# Patient Record
Sex: FEMALE | Race: BLACK OR AFRICAN AMERICAN | NOT HISPANIC OR LATINO
[De-identification: names, ages, dates, MRNs, and addresses within clinical notes are randomized per-mention and may not be internally consistent; named-entity substitution may affect disease eponyms.]

---

## 2017-02-22 ENCOUNTER — RX RENEWAL (OUTPATIENT)
Age: 82
End: 2017-02-22

## 2017-05-04 ENCOUNTER — RX RENEWAL (OUTPATIENT)
Age: 82
End: 2017-05-04

## 2017-06-09 ENCOUNTER — RX RENEWAL (OUTPATIENT)
Age: 82
End: 2017-06-09

## 2017-06-19 ENCOUNTER — RX RENEWAL (OUTPATIENT)
Age: 82
End: 2017-06-19

## 2017-10-01 ENCOUNTER — EMERGENCY (EMERGENCY)
Facility: HOSPITAL | Age: 82
LOS: 1 days | Discharge: HOME | End: 2017-10-01
Admitting: EMERGENCY MEDICINE
Payer: COMMERCIAL

## 2017-10-01 PROCEDURE — 85027 COMPLETE CBC AUTOMATED: CPT

## 2017-10-01 PROCEDURE — 84484 ASSAY OF TROPONIN QUANT: CPT

## 2017-10-01 PROCEDURE — 93005 ELECTROCARDIOGRAM TRACING: CPT

## 2017-10-01 PROCEDURE — 36415 COLL VENOUS BLD VENIPUNCTURE: CPT

## 2017-10-01 PROCEDURE — 80048 BASIC METABOLIC PNL TOTAL CA: CPT

## 2017-10-01 PROCEDURE — 85730 THROMBOPLASTIN TIME PARTIAL: CPT

## 2017-10-01 PROCEDURE — 71045 X-RAY EXAM CHEST 1 VIEW: CPT

## 2017-10-01 PROCEDURE — 85610 PROTHROMBIN TIME: CPT

## 2017-10-01 PROCEDURE — 99284 EMERGENCY DEPT VISIT MOD MDM: CPT | Mod: 25

## 2017-10-01 PROCEDURE — 93010 ELECTROCARDIOGRAM REPORT: CPT

## 2017-10-01 PROCEDURE — 99285 EMERGENCY DEPT VISIT HI MDM: CPT

## 2017-10-01 PROCEDURE — 71010: CPT | Mod: 26

## 2017-10-03 ENCOUNTER — EMERGENCY (EMERGENCY)
Facility: HOSPITAL | Age: 82
LOS: 1 days | Discharge: HOME | End: 2017-10-03
Admitting: INTERNAL MEDICINE
Payer: COMMERCIAL

## 2017-10-03 PROCEDURE — 99220: CPT

## 2017-10-03 PROCEDURE — 93010 ELECTROCARDIOGRAM REPORT: CPT

## 2017-10-03 PROCEDURE — 99285 EMERGENCY DEPT VISIT HI MDM: CPT

## 2017-10-03 PROCEDURE — 70450 CT HEAD/BRAIN W/O DYE: CPT | Mod: 26

## 2017-10-04 PROCEDURE — 99226: CPT

## 2017-10-04 PROCEDURE — 93306 TTE W/DOPPLER COMPLETE: CPT | Mod: 26

## 2017-10-05 PROCEDURE — 82948 REAGENT STRIP/BLOOD GLUCOSE: CPT | Mod: XU

## 2017-10-05 PROCEDURE — 93005 ELECTROCARDIOGRAM TRACING: CPT

## 2017-10-05 PROCEDURE — 85027 COMPLETE CBC AUTOMATED: CPT

## 2017-10-05 PROCEDURE — 99217: CPT

## 2017-10-05 PROCEDURE — G0378: CPT

## 2017-10-05 PROCEDURE — 85652 RBC SED RATE AUTOMATED: CPT

## 2017-10-05 PROCEDURE — 36415 COLL VENOUS BLD VENIPUNCTURE: CPT

## 2017-10-05 PROCEDURE — 96372 THER/PROPH/DIAG INJ SC/IM: CPT

## 2017-10-05 PROCEDURE — 82607 VITAMIN B-12: CPT

## 2017-10-05 PROCEDURE — 82962 GLUCOSE BLOOD TEST: CPT

## 2017-10-05 PROCEDURE — 95812 EEG 41-60 MINUTES: CPT

## 2017-10-05 PROCEDURE — 94640 AIRWAY INHALATION TREATMENT: CPT

## 2017-10-05 PROCEDURE — 80069 RENAL FUNCTION PANEL: CPT

## 2017-10-05 PROCEDURE — 99284 EMERGENCY DEPT VISIT MOD MDM: CPT | Mod: 25

## 2017-10-05 PROCEDURE — 84484 ASSAY OF TROPONIN QUANT: CPT

## 2017-10-05 PROCEDURE — 70450 CT HEAD/BRAIN W/O DYE: CPT | Mod: 26

## 2017-10-05 PROCEDURE — 80048 BASIC METABOLIC PNL TOTAL CA: CPT

## 2017-10-05 PROCEDURE — 84443 ASSAY THYROID STIM HORMONE: CPT

## 2017-10-05 PROCEDURE — 93306 TTE W/DOPPLER COMPLETE: CPT

## 2017-10-05 PROCEDURE — 70450 CT HEAD/BRAIN W/O DYE: CPT

## 2018-01-02 ENCOUNTER — INPATIENT (INPATIENT)
Facility: HOSPITAL | Age: 83
LOS: 2 days | Discharge: NURSING, REHAB OR LONG-TERM CARE | DRG: 377 | End: 2018-01-05
Attending: STUDENT IN AN ORGANIZED HEALTH CARE EDUCATION/TRAINING PROGRAM | Admitting: STUDENT IN AN ORGANIZED HEALTH CARE EDUCATION/TRAINING PROGRAM
Payer: MEDICARE

## 2018-01-02 DIAGNOSIS — J96.01 ACUTE RESPIRATORY FAILURE WITH HYPOXIA: ICD-10-CM

## 2018-01-02 DIAGNOSIS — G62.9 POLYNEUROPATHY, UNSPECIFIED: ICD-10-CM

## 2018-01-02 DIAGNOSIS — J44.9 CHRONIC OBSTRUCTIVE PULMONARY DISEASE, UNSPECIFIED: ICD-10-CM

## 2018-01-02 DIAGNOSIS — J45.909 UNSPECIFIED ASTHMA, UNCOMPLICATED: ICD-10-CM

## 2018-01-02 DIAGNOSIS — G93.41 METABOLIC ENCEPHALOPATHY: ICD-10-CM

## 2018-01-02 DIAGNOSIS — I12.9 HYPERTENSIVE CHRONIC KIDNEY DISEASE WITH STAGE 1 THROUGH STAGE 4 CHRONIC KIDNEY DISEASE, OR UNSPECIFIED CHRONIC KIDNEY DISEASE: ICD-10-CM

## 2018-01-02 DIAGNOSIS — N17.9 ACUTE KIDNEY FAILURE, UNSPECIFIED: ICD-10-CM

## 2018-01-02 DIAGNOSIS — D62 ACUTE POSTHEMORRHAGIC ANEMIA: ICD-10-CM

## 2018-01-02 DIAGNOSIS — K92.2 GASTROINTESTINAL HEMORRHAGE, UNSPECIFIED: ICD-10-CM

## 2018-01-02 DIAGNOSIS — E83.42 HYPOMAGNESEMIA: ICD-10-CM

## 2018-01-02 DIAGNOSIS — F41.9 ANXIETY DISORDER, UNSPECIFIED: ICD-10-CM

## 2018-01-02 DIAGNOSIS — F03.90 UNSPECIFIED DEMENTIA WITHOUT BEHAVIORAL DISTURBANCE: ICD-10-CM

## 2018-01-02 DIAGNOSIS — E87.2 ACIDOSIS: ICD-10-CM

## 2018-01-02 DIAGNOSIS — D64.9 ANEMIA, UNSPECIFIED: ICD-10-CM

## 2018-01-02 DIAGNOSIS — F32.9 MAJOR DEPRESSIVE DISORDER, SINGLE EPISODE, UNSPECIFIED: ICD-10-CM

## 2018-01-02 DIAGNOSIS — N18.3 CHRONIC KIDNEY DISEASE, STAGE 3 (MODERATE): ICD-10-CM

## 2018-01-02 DIAGNOSIS — Z87.891 PERSONAL HISTORY OF NICOTINE DEPENDENCE: ICD-10-CM

## 2018-01-02 PROCEDURE — 93010 ELECTROCARDIOGRAM REPORT: CPT

## 2018-01-02 PROCEDURE — 71045 X-RAY EXAM CHEST 1 VIEW: CPT | Mod: 26

## 2018-01-02 PROCEDURE — 99223 1ST HOSP IP/OBS HIGH 75: CPT

## 2018-01-03 PROCEDURE — 99233 SBSQ HOSP IP/OBS HIGH 50: CPT

## 2018-01-04 PROCEDURE — 99233 SBSQ HOSP IP/OBS HIGH 50: CPT

## 2018-01-05 PROCEDURE — 86922 COMPATIBILITY TEST ANTIGLOB: CPT

## 2018-01-05 PROCEDURE — 87040 BLOOD CULTURE FOR BACTERIA: CPT

## 2018-01-05 PROCEDURE — 97116 GAIT TRAINING THERAPY: CPT

## 2018-01-05 PROCEDURE — 99285 EMERGENCY DEPT VISIT HI MDM: CPT | Mod: 25

## 2018-01-05 PROCEDURE — 86900 BLOOD TYPING SEROLOGIC ABO: CPT

## 2018-01-05 PROCEDURE — 97161 PT EVAL LOW COMPLEX 20 MIN: CPT

## 2018-01-05 PROCEDURE — 83880 ASSAY OF NATRIURETIC PEPTIDE: CPT

## 2018-01-05 PROCEDURE — 82553 CREATINE MB FRACTION: CPT

## 2018-01-05 PROCEDURE — 80053 COMPREHEN METABOLIC PANEL: CPT

## 2018-01-05 PROCEDURE — P9016: CPT

## 2018-01-05 PROCEDURE — 36430 TRANSFUSION BLD/BLD COMPNT: CPT

## 2018-01-05 PROCEDURE — 71045 X-RAY EXAM CHEST 1 VIEW: CPT

## 2018-01-05 PROCEDURE — 84100 ASSAY OF PHOSPHORUS: CPT

## 2018-01-05 PROCEDURE — 83605 ASSAY OF LACTIC ACID: CPT

## 2018-01-05 PROCEDURE — 99239 HOSP IP/OBS DSCHRG MGMT >30: CPT

## 2018-01-05 PROCEDURE — 83735 ASSAY OF MAGNESIUM: CPT

## 2018-01-05 PROCEDURE — 85610 PROTHROMBIN TIME: CPT

## 2018-01-05 PROCEDURE — 84484 ASSAY OF TROPONIN QUANT: CPT

## 2018-01-05 PROCEDURE — 82550 ASSAY OF CK (CPK): CPT

## 2018-01-05 PROCEDURE — 93005 ELECTROCARDIOGRAM TRACING: CPT

## 2018-01-05 PROCEDURE — 82803 BLOOD GASES ANY COMBINATION: CPT

## 2018-01-05 PROCEDURE — 86850 RBC ANTIBODY SCREEN: CPT

## 2018-01-05 PROCEDURE — 80048 BASIC METABOLIC PNL TOTAL CA: CPT

## 2018-01-05 PROCEDURE — 96360 HYDRATION IV INFUSION INIT: CPT

## 2018-01-05 PROCEDURE — 85730 THROMBOPLASTIN TIME PARTIAL: CPT

## 2018-01-05 PROCEDURE — 36600 WITHDRAWAL OF ARTERIAL BLOOD: CPT

## 2018-01-05 PROCEDURE — 85027 COMPLETE CBC AUTOMATED: CPT

## 2018-02-02 ENCOUNTER — APPOINTMENT (OUTPATIENT)
Dept: HEMATOLOGY ONCOLOGY | Facility: CLINIC | Age: 83
End: 2018-02-02

## 2018-06-20 ENCOUNTER — INPATIENT (INPATIENT)
Facility: HOSPITAL | Age: 83
LOS: 2 days | Discharge: OTHER INSTITUTION | DRG: 192 | End: 2018-06-23
Attending: INTERNAL MEDICINE | Admitting: INTERNAL MEDICINE
Payer: MEDICARE

## 2018-06-20 VITALS
SYSTOLIC BLOOD PRESSURE: 181 MMHG | HEART RATE: 85 BPM | OXYGEN SATURATION: 99 % | TEMPERATURE: 98 F | WEIGHT: 130.29 LBS | RESPIRATION RATE: 22 BRPM | DIASTOLIC BLOOD PRESSURE: 117 MMHG

## 2018-06-20 DIAGNOSIS — R06.03 ACUTE RESPIRATORY DISTRESS: ICD-10-CM

## 2018-06-20 DIAGNOSIS — R06.02 SHORTNESS OF BREATH: ICD-10-CM

## 2018-06-20 DIAGNOSIS — J44.9 CHRONIC OBSTRUCTIVE PULMONARY DISEASE, UNSPECIFIED: ICD-10-CM

## 2018-06-20 DIAGNOSIS — I10 ESSENTIAL (PRIMARY) HYPERTENSION: ICD-10-CM

## 2018-06-20 LAB
ALBUMIN SERPL ELPH-MCNC: 3.6 G/DL — SIGNIFICANT CHANGE UP (ref 3.3–5)
ALP SERPL-CCNC: 88 U/L — SIGNIFICANT CHANGE UP (ref 40–120)
ALT FLD-CCNC: 22 U/L DA — SIGNIFICANT CHANGE UP (ref 10–45)
ANION GAP SERPL CALC-SCNC: 7 MMOL/L — SIGNIFICANT CHANGE UP (ref 5–17)
APTT BLD: 25 SEC — LOW (ref 27.5–37.4)
AST SERPL-CCNC: 26 U/L — SIGNIFICANT CHANGE UP (ref 10–40)
BASOPHILS # BLD AUTO: 0.1 K/UL — SIGNIFICANT CHANGE UP (ref 0–0.2)
BASOPHILS NFR BLD AUTO: 1.1 % — SIGNIFICANT CHANGE UP (ref 0–2)
BILIRUB SERPL-MCNC: 0.2 MG/DL — SIGNIFICANT CHANGE UP (ref 0.2–1.2)
BUN SERPL-MCNC: 26 MG/DL — HIGH (ref 7–23)
CALCIUM SERPL-MCNC: 9.1 MG/DL — SIGNIFICANT CHANGE UP (ref 8.4–10.5)
CHLORIDE SERPL-SCNC: 106 MMOL/L — SIGNIFICANT CHANGE UP (ref 96–108)
CO2 SERPL-SCNC: 28 MMOL/L — SIGNIFICANT CHANGE UP (ref 22–31)
CREAT SERPL-MCNC: 1.53 MG/DL — HIGH (ref 0.5–1.3)
D DIMER BLD IA.RAPID-MCNC: 4569 NG/ML DDU — HIGH
EOSINOPHIL # BLD AUTO: 0.1 K/UL — SIGNIFICANT CHANGE UP (ref 0–0.5)
EOSINOPHIL NFR BLD AUTO: 1.4 % — SIGNIFICANT CHANGE UP (ref 0–6)
GLUCOSE SERPL-MCNC: 101 MG/DL — HIGH (ref 70–99)
HCT VFR BLD CALC: 32.9 % — LOW (ref 34.5–45)
HGB BLD-MCNC: 10.4 G/DL — LOW (ref 11.5–15.5)
INR BLD: 1.02 RATIO — SIGNIFICANT CHANGE UP (ref 0.88–1.16)
LYMPHOCYTES # BLD AUTO: 0.6 K/UL — LOW (ref 1–3.3)
LYMPHOCYTES # BLD AUTO: 7.8 % — LOW (ref 13–44)
MCHC RBC-ENTMCNC: 25.4 PG — LOW (ref 27–34)
MCHC RBC-ENTMCNC: 31.7 GM/DL — LOW (ref 32–36)
MCV RBC AUTO: 80.2 FL — SIGNIFICANT CHANGE UP (ref 80–100)
MONOCYTES # BLD AUTO: 0.5 K/UL — SIGNIFICANT CHANGE UP (ref 0–0.9)
MONOCYTES NFR BLD AUTO: 6 % — SIGNIFICANT CHANGE UP (ref 1–9)
NEUTROPHILS # BLD AUTO: 6.9 K/UL — SIGNIFICANT CHANGE UP (ref 1.8–7.4)
NEUTROPHILS NFR BLD AUTO: 83.7 % — HIGH (ref 43–77)
NT-PROBNP SERPL-SCNC: 247 PG/ML — SIGNIFICANT CHANGE UP (ref 0–300)
PLATELET # BLD AUTO: 179 K/UL — SIGNIFICANT CHANGE UP (ref 150–400)
POTASSIUM SERPL-MCNC: 4.5 MMOL/L — SIGNIFICANT CHANGE UP (ref 3.5–5.3)
POTASSIUM SERPL-SCNC: 4.5 MMOL/L — SIGNIFICANT CHANGE UP (ref 3.5–5.3)
PROT SERPL-MCNC: 7.7 G/DL — SIGNIFICANT CHANGE UP (ref 6–8.3)
PROTHROM AB SERPL-ACNC: 11.3 SEC — SIGNIFICANT CHANGE UP (ref 9.8–12.7)
RBC # BLD: 4.1 M/UL — SIGNIFICANT CHANGE UP (ref 3.8–5.2)
RBC # FLD: 18.2 % — HIGH (ref 10.3–14.5)
SODIUM SERPL-SCNC: 141 MMOL/L — SIGNIFICANT CHANGE UP (ref 135–145)
TROPONIN I SERPL-MCNC: <.017 NG/ML — LOW (ref 0.02–0.06)
TROPONIN I SERPL-MCNC: <.017 NG/ML — LOW (ref 0.02–0.06)
WBC # BLD: 8.2 K/UL — SIGNIFICANT CHANGE UP (ref 3.8–10.5)
WBC # FLD AUTO: 8.2 K/UL — SIGNIFICANT CHANGE UP (ref 3.8–10.5)

## 2018-06-20 PROCEDURE — 93010 ELECTROCARDIOGRAM REPORT: CPT

## 2018-06-20 PROCEDURE — 71046 X-RAY EXAM CHEST 2 VIEWS: CPT | Mod: 26

## 2018-06-20 PROCEDURE — 99285 EMERGENCY DEPT VISIT HI MDM: CPT

## 2018-06-20 PROCEDURE — 99223 1ST HOSP IP/OBS HIGH 75: CPT

## 2018-06-20 RX ORDER — NIFEDIPINE 30 MG
1 TABLET, EXTENDED RELEASE 24 HR ORAL
Qty: 0 | Refills: 0 | COMMUNITY

## 2018-06-20 RX ORDER — QUETIAPINE FUMARATE 200 MG/1
0 TABLET, FILM COATED ORAL
Qty: 0 | Refills: 0 | COMMUNITY

## 2018-06-20 RX ORDER — IPRATROPIUM/ALBUTEROL SULFATE 18-103MCG
3 AEROSOL WITH ADAPTER (GRAM) INHALATION EVERY 6 HOURS
Qty: 0 | Refills: 0 | Status: DISCONTINUED | OUTPATIENT
Start: 2018-06-20 | End: 2018-06-23

## 2018-06-20 RX ORDER — ACETAMINOPHEN 500 MG
650 TABLET ORAL EVERY 6 HOURS
Qty: 0 | Refills: 0 | Status: DISCONTINUED | OUTPATIENT
Start: 2018-06-20 | End: 2018-06-23

## 2018-06-20 RX ORDER — ENOXAPARIN SODIUM 100 MG/ML
60 INJECTION SUBCUTANEOUS DAILY
Qty: 0 | Refills: 0 | Status: DISCONTINUED | OUTPATIENT
Start: 2018-06-20 | End: 2018-06-21

## 2018-06-20 RX ORDER — MIRTAZAPINE 45 MG/1
7.5 TABLET, ORALLY DISINTEGRATING ORAL AT BEDTIME
Qty: 0 | Refills: 0 | Status: DISCONTINUED | OUTPATIENT
Start: 2018-06-20 | End: 2018-06-23

## 2018-06-20 RX ORDER — QUETIAPINE FUMARATE 200 MG/1
25 TABLET, FILM COATED ORAL
Qty: 0 | Refills: 0 | Status: DISCONTINUED | OUTPATIENT
Start: 2018-06-20 | End: 2018-06-23

## 2018-06-20 RX ORDER — GABAPENTIN 400 MG/1
600 CAPSULE ORAL AT BEDTIME
Qty: 0 | Refills: 0 | Status: DISCONTINUED | OUTPATIENT
Start: 2018-06-20 | End: 2018-06-23

## 2018-06-20 RX ORDER — HEPARIN SODIUM 5000 [USP'U]/ML
5000 INJECTION INTRAVENOUS; SUBCUTANEOUS EVERY 8 HOURS
Qty: 0 | Refills: 0 | Status: DISCONTINUED | OUTPATIENT
Start: 2018-06-20 | End: 2018-06-23

## 2018-06-20 RX ORDER — GABAPENTIN 400 MG/1
0 CAPSULE ORAL
Qty: 0 | Refills: 0 | COMMUNITY

## 2018-06-20 RX ORDER — LANOLIN ALCOHOL/MO/W.PET/CERES
1 CREAM (GRAM) TOPICAL
Qty: 0 | Refills: 0 | COMMUNITY

## 2018-06-20 RX ORDER — SENNA PLUS 8.6 MG/1
2 TABLET ORAL AT BEDTIME
Qty: 0 | Refills: 0 | Status: DISCONTINUED | OUTPATIENT
Start: 2018-06-20 | End: 2018-06-23

## 2018-06-20 RX ORDER — NIFEDIPINE 30 MG
60 TABLET, EXTENDED RELEASE 24 HR ORAL DAILY
Qty: 0 | Refills: 0 | Status: DISCONTINUED | OUTPATIENT
Start: 2018-06-20 | End: 2018-06-23

## 2018-06-20 RX ORDER — GABAPENTIN 400 MG/1
600 CAPSULE ORAL
Qty: 0 | Refills: 0 | COMMUNITY

## 2018-06-20 RX ORDER — SENNA PLUS 8.6 MG/1
0 TABLET ORAL
Qty: 0 | Refills: 0 | COMMUNITY

## 2018-06-20 RX ORDER — SODIUM CHLORIDE 9 MG/ML
3 INJECTION INTRAMUSCULAR; INTRAVENOUS; SUBCUTANEOUS EVERY 8 HOURS
Qty: 0 | Refills: 0 | Status: DISCONTINUED | OUTPATIENT
Start: 2018-06-20 | End: 2018-06-23

## 2018-06-20 RX ORDER — FERROUS SULFATE 325(65) MG
0 TABLET ORAL
Qty: 0 | Refills: 0 | COMMUNITY

## 2018-06-20 RX ORDER — BUDESONIDE, MICRONIZED 100 %
0.5 POWDER (GRAM) MISCELLANEOUS
Qty: 0 | Refills: 0 | Status: DISCONTINUED | OUTPATIENT
Start: 2018-06-20 | End: 2018-06-23

## 2018-06-20 RX ORDER — LANOLIN ALCOHOL/MO/W.PET/CERES
2 CREAM (GRAM) TOPICAL
Qty: 0 | Refills: 0 | COMMUNITY

## 2018-06-20 RX ORDER — MIRTAZAPINE 45 MG/1
1 TABLET, ORALLY DISINTEGRATING ORAL
Qty: 0 | Refills: 0 | COMMUNITY

## 2018-06-20 RX ORDER — MIRTAZAPINE 45 MG/1
7.5 TABLET, ORALLY DISINTEGRATING ORAL AT BEDTIME
Qty: 0 | Refills: 0 | Status: DISCONTINUED | OUTPATIENT
Start: 2018-06-20 | End: 2018-06-20

## 2018-06-20 RX ORDER — BUDESONIDE, MICRONIZED 100 %
2 POWDER (GRAM) MISCELLANEOUS
Qty: 0 | Refills: 0 | COMMUNITY

## 2018-06-20 RX ORDER — ASCORBIC ACID 60 MG
500 TABLET,CHEWABLE ORAL DAILY
Qty: 0 | Refills: 0 | Status: DISCONTINUED | OUTPATIENT
Start: 2018-06-20 | End: 2018-06-23

## 2018-06-20 RX ORDER — MIRTAZAPINE 45 MG/1
0.5 TABLET, ORALLY DISINTEGRATING ORAL
Qty: 0 | Refills: 0 | COMMUNITY

## 2018-06-20 RX ORDER — FERROUS SULFATE 325(65) MG
325 TABLET ORAL DAILY
Qty: 0 | Refills: 0 | Status: DISCONTINUED | OUTPATIENT
Start: 2018-06-20 | End: 2018-06-23

## 2018-06-20 RX ORDER — SODIUM CHLORIDE 9 MG/ML
3 INJECTION INTRAMUSCULAR; INTRAVENOUS; SUBCUTANEOUS ONCE
Qty: 0 | Refills: 0 | Status: COMPLETED | OUTPATIENT
Start: 2018-06-20 | End: 2018-06-20

## 2018-06-20 RX ORDER — ASCORBIC ACID 60 MG
1 TABLET,CHEWABLE ORAL
Qty: 0 | Refills: 0 | COMMUNITY

## 2018-06-20 RX ADMIN — SODIUM CHLORIDE 3 MILLILITER(S): 9 INJECTION INTRAMUSCULAR; INTRAVENOUS; SUBCUTANEOUS at 18:23

## 2018-06-20 RX ADMIN — MIRTAZAPINE 7.5 MILLIGRAM(S): 45 TABLET, ORALLY DISINTEGRATING ORAL at 22:41

## 2018-06-20 RX ADMIN — ENOXAPARIN SODIUM 60 MILLIGRAM(S): 100 INJECTION SUBCUTANEOUS at 21:06

## 2018-06-20 RX ADMIN — HEPARIN SODIUM 5000 UNIT(S): 5000 INJECTION INTRAVENOUS; SUBCUTANEOUS at 22:41

## 2018-06-20 RX ADMIN — GABAPENTIN 600 MILLIGRAM(S): 400 CAPSULE ORAL at 22:40

## 2018-06-20 RX ADMIN — SENNA PLUS 2 TABLET(S): 8.6 TABLET ORAL at 22:40

## 2018-06-20 RX ADMIN — SODIUM CHLORIDE 3 MILLILITER(S): 9 INJECTION INTRAMUSCULAR; INTRAVENOUS; SUBCUTANEOUS at 22:37

## 2018-06-20 NOTE — H&P ADULT - NSHPLABSRESULTS_GEN_ALL_CORE
10.4   8.2   )-----------( 179      ( 20 Jun 2018 17:55 )             32.9       06-20    141  |  106  |  26<H>  ----------------------------<  101<H>  4.5   |  28  |  1.53<H>    Ca    9.1      20 Jun 2018 17:55    TPro  7.7  /  Alb  3.6  /  TBili  0.2  /  DBili  x   /  AST  26  /  ALT  22  /  AlkPhos  88  06-20         LIVER FUNCTIONS - ( 20 Jun 2018 17:55 )  Alb: 3.6 g/dL / Pro: 7.7 g/dL / ALK PHOS: 88 U/L / ALT: 22 U/L DA / AST: 26 U/L / GGT: x               PT/INR - ( 20 Jun 2018 17:55 )   PT: 11.3 sec;   INR: 1.02 ratio         PTT - ( 20 Jun 2018 17:55 )  PTT:25.0 sec    CARDIAC MARKERS ( 20 Jun 2018 17:55 )  <.017 ng/mL / x     / x     / x     / x          Serum Pro-Brain Natriuretic Peptide: 247 pg/mL (06-20-18 @ 17:55)        CAPILLARY BLOOD GLUCOSE            EKG: NSR at 84bpm, no acute ST changes.      < from: Xray Chest 2 Views PA/Lat (06.20.18 @ 17:53) >    EXAM:  XR CHEST PA LAT 2V      PROCEDURE DATE:  06/20/2018        INTERPRETATION:  cough    PA and lateral radiographs of the chest demonstrate clear lungs.      The costophrenic angles are clear.      The heart is enlarged.     The aorta is tortuous.    The film is rotated    IMPRESSION:  Cardiomegaly with tortuous aorta. Clear lungs.      < end of copied text > 10.4   8.2   )-----------( 179      ( 20 Jun 2018 17:55 )             32.9       06-20    141  |  106  |  26<H>  ----------------------------<  101<H>  4.5   |  28  |  1.53<H>    Ca    9.1      20 Jun 2018 17:55    TPro  7.7  /  Alb  3.6  /  TBili  0.2  /  DBili  x   /  AST  26  /  ALT  22  /  AlkPhos  88  06-20         LIVER FUNCTIONS - ( 20 Jun 2018 17:55 )  Alb: 3.6 g/dL / Pro: 7.7 g/dL / ALK PHOS: 88 U/L / ALT: 22 U/L DA / AST: 26 U/L / GGT: x               PT/INR - ( 20 Jun 2018 17:55 )   PT: 11.3 sec;   INR: 1.02 ratio         PTT - ( 20 Jun 2018 17:55 )  PTT:25.0 sec    CARDIAC MARKERS ( 20 Jun 2018 17:55 )  <.017 ng/mL / x     / x     / x     / x          Serum Pro-Brain Natriuretic Peptide: 247 pg/mL (06-20-18 @ 17:55)        CAPILLARY BLOOD GLUCOSE        06-20 @ 17:55  4569 ng/mL DDU<H>      EKG:      CXR: wet read    EKG: NSR at 84bpm, no acute ST changes.      < from: Xray Chest 2 Views PA/Lat (06.20.18 @ 17:53) >    EXAM:  XR CHEST PA LAT 2V      PROCEDURE DATE:  06/20/2018        INTERPRETATION:  cough    PA and lateral radiographs of the chest demonstrate clear lungs.      The costophrenic angles are clear.      The heart is enlarged.     The aorta is tortuous.    The film is rotated    IMPRESSION:  Cardiomegaly with tortuous aorta. Clear lungs.      < end of copied text >

## 2018-06-20 NOTE — H&P ADULT - HISTORY OF PRESENT ILLNESS
83F hx of dementia, HTN, COPD/asthma on chronic Prednisone, TAA pw from [facility] with hypoxemia with recorded sats in the 80s. Pt denied any specific complaint. Pt is alert but not oriented and can not recall any events. She denied any current SOB, CP, NVD, headache, dysuria.   LM with son Riky Doyle [phone]

## 2018-06-20 NOTE — ED PROVIDER NOTE - MEDICAL DECISION MAKING DETAILS
pt with acute najera without an obvious etiology, BUN/creatinine elevated vq scan tomorrow  Pt will be given one dose of lovenox  Case discussed with Dr. Tanner

## 2018-06-20 NOTE — ED ADULT NURSE NOTE - OBJECTIVE STATEMENT
Pt arrives to ER brought in by EMS for SOB. Staff at the [facility] pt had increased SOB on exertion today with an O2 sat on room air of 83%. Pt given a nebulizer en route to hospital. Pt breathing effortlessly on arrival to ER with an O2 sat of 100 on NRB. Pt has dementia and unable to verbalize complaints. Pt's O2 Sat dropped once removed from NRB so she was placed on nasal cannula at 4LPM. Negative fever/chills. Negative visible trauma noted.

## 2018-06-20 NOTE — H&P ADULT - PMH
Asthma    COPD (chronic obstructive pulmonary disease)    Depression    Hypertension    Thoracic aortic aneurysm without rupture

## 2018-06-20 NOTE — H&P ADULT - NSHPPHYSICALEXAM_GEN_ALL_CORE
Vital Signs Last 24 Hrs  T(C): 36.4 (2018 16:55), Max: 36.4 (2018 16:55)  T(F): 97.5 (2018 16:55), Max: 97.5 (2018 16:55)  HR: 97 (2018 18:56) (84 - 97)  BP: 167/106 (2018 18:56) (167/106 - 181/117)  BP(mean): --  RR: 22 (2018 18:56) (20 - 22)  SpO2: 98% (2018 18:56) (98% - 100%)  Daily     Daily   CAPILLARY BLOOD GLUCOSE        I&O's Summary    currently sat 100% on 2L   GENERAL: NAD  HEAD:  Normocephalic  EYES: EOMI, PERRLA, conjunctiva and sclera clear  ENMT: No tonsillar erythema, exudates, or enlargement; Moist mucous membranes, No lesions  NECK: Supple, No JVD, no bruit, normal thyroid  NERVOUS SYSTEM:  Alert & Oriented to self unable to tell , Good concentration; grossly  Motor Strength 5/5 B/L upper and lower extremities; DTRs 2+ intact and symmetric  CHEST/LUNG: Clear to auscultation bilaterally; No rales, rhonchi, wheezing, or rubs  HEART: Regular rate and rhythm; +systolic murmur, rubs, or gallops  ABDOMEN: Soft, Nontender, Nondistended; Bowel sounds present  EXTREMITIES:  2+ Peripheral Pulses, No clubbing, cyanosis, or edema  LYMPH: No lymphadenopathy noted  SKIN: No rashes or lesions

## 2018-06-20 NOTE — H&P ADULT - ASSESSMENT
83F hx of dementia, HTN, COPD, 83F hx of dementia, HTN, COPD on chronic steroids, TAA pw SOB and hypoxemia with  no obvious COPD exac and elevated D-dimer

## 2018-06-20 NOTE — ED PROVIDER NOTE - OBJECTIVE STATEMENT
83 year old F sent from assisted living for najera.  Pt denies chest pain or shortness of breath at this time.

## 2018-06-20 NOTE — ED ADULT NURSE NOTE - PMH
Abdominal aneurysm    Asthma    COPD (chronic obstructive pulmonary disease)    Depression    Hypertension

## 2018-06-21 DIAGNOSIS — R06.02 SHORTNESS OF BREATH: ICD-10-CM

## 2018-06-21 DIAGNOSIS — D64.9 ANEMIA, UNSPECIFIED: ICD-10-CM

## 2018-06-21 DIAGNOSIS — Z87.891 PERSONAL HISTORY OF NICOTINE DEPENDENCE: ICD-10-CM

## 2018-06-21 DIAGNOSIS — R09.02 HYPOXEMIA: ICD-10-CM

## 2018-06-21 LAB
ALBUMIN SERPL ELPH-MCNC: 3 G/DL — LOW (ref 3.3–5)
ALP SERPL-CCNC: 76 U/L — SIGNIFICANT CHANGE UP (ref 40–120)
ALT FLD-CCNC: 21 U/L DA — SIGNIFICANT CHANGE UP (ref 10–45)
ANION GAP SERPL CALC-SCNC: 6 MMOL/L — SIGNIFICANT CHANGE UP (ref 5–17)
AST SERPL-CCNC: 21 U/L — SIGNIFICANT CHANGE UP (ref 10–40)
BASOPHILS # BLD AUTO: 0.1 K/UL — SIGNIFICANT CHANGE UP (ref 0–0.2)
BASOPHILS NFR BLD AUTO: 1.2 % — SIGNIFICANT CHANGE UP (ref 0–2)
BILIRUB SERPL-MCNC: 0.3 MG/DL — SIGNIFICANT CHANGE UP (ref 0.2–1.2)
BUN SERPL-MCNC: 23 MG/DL — SIGNIFICANT CHANGE UP (ref 7–23)
CALCIUM SERPL-MCNC: 8.8 MG/DL — SIGNIFICANT CHANGE UP (ref 8.4–10.5)
CHLORIDE SERPL-SCNC: 108 MMOL/L — SIGNIFICANT CHANGE UP (ref 96–108)
CO2 SERPL-SCNC: 29 MMOL/L — SIGNIFICANT CHANGE UP (ref 22–31)
CREAT SERPL-MCNC: 1.31 MG/DL — HIGH (ref 0.5–1.3)
EOSINOPHIL # BLD AUTO: 0.3 K/UL — SIGNIFICANT CHANGE UP (ref 0–0.5)
EOSINOPHIL NFR BLD AUTO: 4.4 % — SIGNIFICANT CHANGE UP (ref 0–6)
GLUCOSE SERPL-MCNC: 81 MG/DL — SIGNIFICANT CHANGE UP (ref 70–99)
HBA1C BLD-MCNC: 5.3 % — SIGNIFICANT CHANGE UP (ref 4–5.6)
HCT VFR BLD CALC: 30 % — LOW (ref 34.5–45)
HGB BLD-MCNC: 9 G/DL — LOW (ref 11.5–15.5)
LYMPHOCYTES # BLD AUTO: 0.9 K/UL — LOW (ref 1–3.3)
LYMPHOCYTES # BLD AUTO: 15.7 % — SIGNIFICANT CHANGE UP (ref 13–44)
MAGNESIUM SERPL-MCNC: 2.1 MG/DL — SIGNIFICANT CHANGE UP (ref 1.6–2.6)
MCHC RBC-ENTMCNC: 24.2 PG — LOW (ref 27–34)
MCHC RBC-ENTMCNC: 30 GM/DL — LOW (ref 32–36)
MCV RBC AUTO: 80.6 FL — SIGNIFICANT CHANGE UP (ref 80–100)
MONOCYTES # BLD AUTO: 0.4 K/UL — SIGNIFICANT CHANGE UP (ref 0–0.9)
MONOCYTES NFR BLD AUTO: 7.2 % — SIGNIFICANT CHANGE UP (ref 1–9)
NEUTROPHILS # BLD AUTO: 4.3 K/UL — SIGNIFICANT CHANGE UP (ref 1.8–7.4)
NEUTROPHILS NFR BLD AUTO: 71.5 % — SIGNIFICANT CHANGE UP (ref 43–77)
PHOSPHATE SERPL-MCNC: 3.6 MG/DL — SIGNIFICANT CHANGE UP (ref 2.5–4.5)
PLATELET # BLD AUTO: 162 K/UL — SIGNIFICANT CHANGE UP (ref 150–400)
POTASSIUM SERPL-MCNC: 4 MMOL/L — SIGNIFICANT CHANGE UP (ref 3.5–5.3)
POTASSIUM SERPL-SCNC: 4 MMOL/L — SIGNIFICANT CHANGE UP (ref 3.5–5.3)
PROT SERPL-MCNC: 6.5 G/DL — SIGNIFICANT CHANGE UP (ref 6–8.3)
RBC # BLD: 3.72 M/UL — LOW (ref 3.8–5.2)
RBC # FLD: 17.7 % — HIGH (ref 10.3–14.5)
SODIUM SERPL-SCNC: 143 MMOL/L — SIGNIFICANT CHANGE UP (ref 135–145)
TROPONIN I SERPL-MCNC: <.017 NG/ML — LOW (ref 0.02–0.06)
WBC # BLD: 6 K/UL — SIGNIFICANT CHANGE UP (ref 3.8–10.5)
WBC # FLD AUTO: 6 K/UL — SIGNIFICANT CHANGE UP (ref 3.8–10.5)

## 2018-06-21 PROCEDURE — 71250 CT THORAX DX C-: CPT | Mod: 26

## 2018-06-21 PROCEDURE — 93970 EXTREMITY STUDY: CPT | Mod: 26

## 2018-06-21 PROCEDURE — 99233 SBSQ HOSP IP/OBS HIGH 50: CPT

## 2018-06-21 PROCEDURE — 93306 TTE W/DOPPLER COMPLETE: CPT | Mod: 26

## 2018-06-21 RX ORDER — IPRATROPIUM/ALBUTEROL SULFATE 18-103MCG
3 AEROSOL WITH ADAPTER (GRAM) INHALATION EVERY 6 HOURS
Qty: 0 | Refills: 0 | Status: DISCONTINUED | OUTPATIENT
Start: 2018-06-21 | End: 2018-06-23

## 2018-06-21 RX ORDER — HALOPERIDOL DECANOATE 100 MG/ML
1 INJECTION INTRAMUSCULAR ONCE
Qty: 0 | Refills: 0 | Status: COMPLETED | OUTPATIENT
Start: 2018-06-21 | End: 2018-06-21

## 2018-06-21 RX ORDER — BUDESONIDE AND FORMOTEROL FUMARATE DIHYDRATE 160; 4.5 UG/1; UG/1
2 AEROSOL RESPIRATORY (INHALATION)
Qty: 0 | Refills: 0 | Status: COMPLETED | OUTPATIENT
Start: 2018-06-21 | End: 2019-05-20

## 2018-06-21 RX ORDER — TIOTROPIUM BROMIDE 18 UG/1
1 CAPSULE ORAL; RESPIRATORY (INHALATION) DAILY
Qty: 0 | Refills: 0 | Status: DISCONTINUED | OUTPATIENT
Start: 2018-06-21 | End: 2018-06-23

## 2018-06-21 RX ADMIN — GABAPENTIN 600 MILLIGRAM(S): 400 CAPSULE ORAL at 21:50

## 2018-06-21 RX ADMIN — Medication 5 MILLIGRAM(S): at 05:57

## 2018-06-21 RX ADMIN — SENNA PLUS 2 TABLET(S): 8.6 TABLET ORAL at 21:49

## 2018-06-21 RX ADMIN — Medication 0.5 MILLIGRAM(S): at 10:32

## 2018-06-21 RX ADMIN — HEPARIN SODIUM 5000 UNIT(S): 5000 INJECTION INTRAVENOUS; SUBCUTANEOUS at 05:56

## 2018-06-21 RX ADMIN — SODIUM CHLORIDE 3 MILLILITER(S): 9 INJECTION INTRAMUSCULAR; INTRAVENOUS; SUBCUTANEOUS at 06:08

## 2018-06-21 RX ADMIN — SODIUM CHLORIDE 3 MILLILITER(S): 9 INJECTION INTRAMUSCULAR; INTRAVENOUS; SUBCUTANEOUS at 19:02

## 2018-06-21 RX ADMIN — HALOPERIDOL DECANOATE 1 MILLIGRAM(S): 100 INJECTION INTRAMUSCULAR at 15:24

## 2018-06-21 RX ADMIN — HEPARIN SODIUM 5000 UNIT(S): 5000 INJECTION INTRAVENOUS; SUBCUTANEOUS at 21:50

## 2018-06-21 RX ADMIN — Medication 325 MILLIGRAM(S): at 12:36

## 2018-06-21 RX ADMIN — SODIUM CHLORIDE 3 MILLILITER(S): 9 INJECTION INTRAMUSCULAR; INTRAVENOUS; SUBCUTANEOUS at 21:44

## 2018-06-21 RX ADMIN — HEPARIN SODIUM 5000 UNIT(S): 5000 INJECTION INTRAVENOUS; SUBCUTANEOUS at 15:23

## 2018-06-21 RX ADMIN — QUETIAPINE FUMARATE 25 MILLIGRAM(S): 200 TABLET, FILM COATED ORAL at 05:57

## 2018-06-21 RX ADMIN — Medication 60 MILLIGRAM(S): at 05:57

## 2018-06-21 RX ADMIN — MIRTAZAPINE 7.5 MILLIGRAM(S): 45 TABLET, ORALLY DISINTEGRATING ORAL at 21:49

## 2018-06-21 RX ADMIN — Medication 500 MILLIGRAM(S): at 12:36

## 2018-06-21 NOTE — CONSULT NOTE ADULT - SUBJECTIVE AND OBJECTIVE BOX
PULMONARY CONSULT    Initial HPI on admission:  HPI:  83F hx of dementia, HTN, COPD/asthma on chronic Prednisone, TAA pw from [facility] with hypoxemia with recorded sats in the 80s. Pt denied any specific complaint. Pt is alert but not oriented and can not recall any events. She denied any current SOB, CP, NVD, headache, dysuria.     BRIEF HOSPITAL COURSE: Arousable to voice but does not want to talk. Discussed with son over the phone.     PAST MEDICAL & SURGICAL HISTORY:  Thoracic aortic aneurysm without rupture  Hypertension  Asthma  Depression  COPD (chronic obstructive pulmonary disease)  No significant past surgical history    Allergies    No Known Allergies    Intolerances      FAMILY HISTORY: no known lung cancer    Social history: former 20 pk-yr smoker, quit 30 yrs ago    Review of Systems:  unable to assess at patient would not answer questions      Medications:  MEDICATIONS  (STANDING):  ascorbic acid 500 milliGRAM(s) Oral daily  buDESOnide   0.5 milliGRAM(s) Respule 0.5 milliGRAM(s) Nebulizer two times a day  ferrous    sulfate 325 milliGRAM(s) Oral daily  gabapentin 600 milliGRAM(s) Oral at bedtime  heparin  Injectable 5000 Unit(s) SubCutaneous every 8 hours  mirtazapine 7.5 milliGRAM(s) Oral at bedtime  NIFEdipine XL 60 milliGRAM(s) Oral daily  predniSONE   Tablet 5 milliGRAM(s) Oral daily  QUEtiapine 25 milliGRAM(s) Oral two times a day  senna 2 Tablet(s) Oral at bedtime  sodium chloride 0.9% lock flush 3 milliLiter(s) IV Push every 8 hours    MEDICATIONS  (PRN):  acetaminophen   Tablet 650 milliGRAM(s) Oral every 6 hours PRN For Temp greater than 38 C (100.4 F)  ALBUTerol/ipratropium for Nebulization 3 milliLiter(s) Nebulizer every 6 hours PRN Shortness of Breath and/or Wheezing    Vital Signs Last 24 Hrs  T(C): 36.5 (21 Jun 2018 05:52), Max: 36.5 (21 Jun 2018 05:52)  T(F): 97.7 (21 Jun 2018 05:52), Max: 97.7 (21 Jun 2018 05:52)  HR: 69 (21 Jun 2018 05:52) (69 - 97)  BP: 179/94 (21 Jun 2018 05:52) (151/113 - 181/117)  BP(mean): --  RR: 15 (21 Jun 2018 05:52) (15 - 22)  SpO2: 100% (21 Jun 2018 05:52) (98% - 100%)    LABS:                        9.0    6.0   )-----------( 162      ( 21 Jun 2018 05:05 )             30.0     06-21    143  |  108  |  23  ----------------------------<  81  4.0   |  29  |  1.31<H>    Ca    8.8      21 Jun 2018 05:05  Phos  3.6     06-21  Mg     2.1     06-21    TPro  6.5  /  Alb  3.0<L>  /  TBili  0.3  /  DBili  x   /  AST  21  /  ALT  21  /  AlkPhos  76  06-21      CARDIAC MARKERS ( 21 Jun 2018 04:25 )  <.017 ng/mL / x     / x     / x     / x      CARDIAC MARKERS ( 20 Jun 2018 23:00 )  <.017 ng/mL / x     / x     / x     / x      CARDIAC MARKERS ( 20 Jun 2018 17:55 )  <.017 ng/mL / x     / x     / x     / x          CAPILLARY BLOOD GLUCOSE        PT/INR - ( 20 Jun 2018 17:55 )   PT: 11.3 sec;   INR: 1.02 ratio         PTT - ( 20 Jun 2018 17:55 )  PTT:25.0 sec      Serum Pro-Brain Natriuretic Peptide: 247 pg/mL (06-20-18 @ 17:55)    CULTURES: (if applicable)    Physical Examination:    General: No acute distress.      HEENT: Pupils equal, reactive to light.  Symmetric.    PULM: Decreased BS at bases    CVS: S1, S2    ABD: Soft, nondistended, nontender, normoactive bowel sounds, no masses    EXT: No edema, nontender    SKIN: Warm and well perfused, no rashes noted.    NEURO: Alert, oriented, interactive, nonfocal    RADIOLOGY REVIEWED  CXR: clear    CT chest:    TTE:

## 2018-06-21 NOTE — PROGRESS NOTE ADULT - SUBJECTIVE AND OBJECTIVE BOX
Patient is a 83 year old  female who presents with a chief complaint of hypoxemia, SOB (20 Jun 2018 19:50)    Patient seen and examined, very lethargic unable to provide history/ ROS    MEDICATIONS  (STANDING):  ALBUTerol/ipratropium for Nebulization 3 milliLiter(s) Nebulizer every 6 hours  ascorbic acid 500 milliGRAM(s) Oral daily  buDESOnide   0.5 milliGRAM(s) Respule 0.5 milliGRAM(s) Nebulizer two times a day  buDESOnide 160 MICROgram(s)/formoterol 4.5 MICROgram(s) Inhaler 2 Puff(s) Inhalation two times a day  ferrous    sulfate 325 milliGRAM(s) Oral daily  gabapentin 600 milliGRAM(s) Oral at bedtime  heparin  Injectable 5000 Unit(s) SubCutaneous every 8 hours  mirtazapine 7.5 milliGRAM(s) Oral at bedtime  NIFEdipine XL 60 milliGRAM(s) Oral daily  predniSONE   Tablet 40 milliGRAM(s) Oral once  QUEtiapine 25 milliGRAM(s) Oral two times a day  senna 2 Tablet(s) Oral at bedtime  sodium chloride 0.9% lock flush 3 milliLiter(s) IV Push every 8 hours  tiotropium 18 MICROgram(s) Capsule 1 Capsule(s) Inhalation daily    MEDICATIONS  (PRN):  acetaminophen   Tablet 650 milliGRAM(s) Oral every 6 hours PRN For Temp greater than 38 C (100.4 F)  ALBUTerol/ipratropium for Nebulization 3 milliLiter(s) Nebulizer every 6 hours PRN Shortness of Breath and/or Wheezing      REVIEW OF SYSTEMS: very lethargic unable to provide history/ ROS      PHYSICAL EXAM:  T(C): 36.4 (06-21-18 @ 12:34), Max: 36.5 (06-21-18 @ 05:52)  HR: 71 (06-21-18 @ 12:34) (69 - 97)  BP: 167/90 (06-21-18 @ 12:34) (151/113 - 181/117)  RR: 14 (06-21-18 @ 12:34) (14 - 22)  SpO2: 97% (06-21-18 @ 12:34) (97% - 100%)    I&O's Summary    21 Jun 2018 07:01  -  21 Jun 2018 15:37  --------------------------------------------------------  IN: 0 mL / OUT: 2 mL / NET: -2 mL    GENERAL: Lethargic NAD  	HEAD:  Normocephalic  	EYES: EOMI, PERRL, conjunctiva and sclera clear  	ENMT: No tonsillar erythema, exudates, or enlargement; Moist mucous membranes, No lesions  	NECK: Supple, No JVD, no bruit, normal thyroid  	NERVOUS SYSTEM:  lethargic  	CHEST/LUNG: Clear to auscultation bilaterally; No rales, rhonchi, wheezing, or rubs  	HEART: Regular rate and rhythm; +systolic murmur, rubs, or gallops  	ABDOMEN: Soft, Nontender, Nondistended; Bowel sounds present  	EXTREMITIES:  2+ Peripheral Pulses, No clubbing, cyanosis, or edema  	LYMPH: No lymphadenopathy noted        LABS:                        9.0    6.0   )-----------( 162      ( 21 Jun 2018 05:05 )             30.0     06-21    143  |  108  |  23  ----------------------------<  81  4.0   |  29  |  1.31<H>    Ca    8.8      21 Jun 2018 05:05  Phos  3.6     06-21  Mg     2.1     06-21    TPro  6.5  /  Alb  3.0<L>  /  TBili  0.3  /  DBili  x   /  AST  21  /  ALT  21  /  AlkPhos  76  06-21    PT/INR - ( 20 Jun 2018 17:55 )   PT: 11.3 sec;   INR: 1.02 ratio         PTT - ( 20 Jun 2018 17:55 )  PTT:25.0 sec    RADIOLOGY & ADDITIONAL TESTS:    Imaging Personally Reviewed:  [x] YES  [ ] NO    Consultant(s) Notes Reviewed:  [x] YES  [ ] NO    Care Discussed with Consultants/Other Providers [x] YES  [ ] NO

## 2018-06-21 NOTE — PROGRESS NOTE ADULT - ASSESSMENT
83F hx of dementia, HTN, COPD on chronic steroids, TAA pw SOB and hypoxemia with  no obvious COPD exac and elevated D-dimer

## 2018-06-21 NOTE — PROGRESS NOTE ADULT - PROBLEM SELECTOR PLAN 1
check CT chest,  LE dopplers, will not be able to tolerate V/Q scan, elevated d-dimer   pulm - Dr. Bonner consulted

## 2018-06-22 DIAGNOSIS — F01.50 VASCULAR DEMENTIA WITHOUT BEHAVIORAL DISTURBANCE: ICD-10-CM

## 2018-06-22 DIAGNOSIS — I71.4 ABDOMINAL AORTIC ANEURYSM, WITHOUT RUPTURE: ICD-10-CM

## 2018-06-22 DIAGNOSIS — I71.2 THORACIC AORTIC ANEURYSM, WITHOUT RUPTURE: ICD-10-CM

## 2018-06-22 LAB — AMMONIA BLD-MCNC: 33 UMOL/L — SIGNIFICANT CHANGE UP (ref 11–55)

## 2018-06-22 PROCEDURE — 99233 SBSQ HOSP IP/OBS HIGH 50: CPT

## 2018-06-22 PROCEDURE — 71045 X-RAY EXAM CHEST 1 VIEW: CPT | Mod: 26

## 2018-06-22 PROCEDURE — 99223 1ST HOSP IP/OBS HIGH 75: CPT

## 2018-06-22 RX ORDER — BUDESONIDE AND FORMOTEROL FUMARATE DIHYDRATE 160; 4.5 UG/1; UG/1
2 AEROSOL RESPIRATORY (INHALATION)
Qty: 0 | Refills: 0 | Status: DISCONTINUED | OUTPATIENT
Start: 2018-06-22 | End: 2018-06-23

## 2018-06-22 RX ADMIN — Medication 0.5 MILLIGRAM(S): at 10:06

## 2018-06-22 RX ADMIN — SODIUM CHLORIDE 3 MILLILITER(S): 9 INJECTION INTRAMUSCULAR; INTRAVENOUS; SUBCUTANEOUS at 06:21

## 2018-06-22 RX ADMIN — Medication 325 MILLIGRAM(S): at 12:20

## 2018-06-22 RX ADMIN — Medication 60 MILLIGRAM(S): at 06:16

## 2018-06-22 RX ADMIN — HEPARIN SODIUM 5000 UNIT(S): 5000 INJECTION INTRAVENOUS; SUBCUTANEOUS at 15:05

## 2018-06-22 RX ADMIN — Medication 3 MILLILITER(S): at 16:31

## 2018-06-22 RX ADMIN — SODIUM CHLORIDE 3 MILLILITER(S): 9 INJECTION INTRAMUSCULAR; INTRAVENOUS; SUBCUTANEOUS at 14:46

## 2018-06-22 RX ADMIN — GABAPENTIN 600 MILLIGRAM(S): 400 CAPSULE ORAL at 21:31

## 2018-06-22 RX ADMIN — Medication 40 MILLIGRAM(S): at 06:16

## 2018-06-22 RX ADMIN — Medication 500 MILLIGRAM(S): at 12:20

## 2018-06-22 RX ADMIN — QUETIAPINE FUMARATE 25 MILLIGRAM(S): 200 TABLET, FILM COATED ORAL at 06:21

## 2018-06-22 RX ADMIN — HEPARIN SODIUM 5000 UNIT(S): 5000 INJECTION INTRAVENOUS; SUBCUTANEOUS at 21:30

## 2018-06-22 RX ADMIN — SODIUM CHLORIDE 3 MILLILITER(S): 9 INJECTION INTRAMUSCULAR; INTRAVENOUS; SUBCUTANEOUS at 21:33

## 2018-06-22 RX ADMIN — Medication 0.5 MILLIGRAM(S): at 22:12

## 2018-06-22 RX ADMIN — MIRTAZAPINE 7.5 MILLIGRAM(S): 45 TABLET, ORALLY DISINTEGRATING ORAL at 21:31

## 2018-06-22 RX ADMIN — BUDESONIDE AND FORMOTEROL FUMARATE DIHYDRATE 2 PUFF(S): 160; 4.5 AEROSOL RESPIRATORY (INHALATION) at 22:09

## 2018-06-22 RX ADMIN — Medication 3 MILLILITER(S): at 22:13

## 2018-06-22 RX ADMIN — SENNA PLUS 2 TABLET(S): 8.6 TABLET ORAL at 21:30

## 2018-06-22 RX ADMIN — HEPARIN SODIUM 5000 UNIT(S): 5000 INJECTION INTRAVENOUS; SUBCUTANEOUS at 06:16

## 2018-06-22 RX ADMIN — Medication 3 MILLILITER(S): at 10:06

## 2018-06-22 RX ADMIN — QUETIAPINE FUMARATE 25 MILLIGRAM(S): 200 TABLET, FILM COATED ORAL at 17:43

## 2018-06-22 NOTE — CONSULT NOTE ADULT - SUBJECTIVE AND OBJECTIVE BOX
HPI:  83F hx of dementia, HTN, COPD/asthma on chronic Prednisone, TAA pw from [facility] with hypoxemia with recorded sats in the 80s. Pt denied any specific complaint. Pt is alert but not oriented and can not recall any events. She denied any current SOB, CP, NVD, headache, dysuria.   LM with son Riky Doyle [phone] (20 Jun 2018 19:50)      PAST MEDICAL & SURGICAL HISTORY:  Thoracic aortic aneurysm without rupture  Hypertension  Asthma  Depression  COPD (chronic obstructive pulmonary disease)  No significant past surgical history      SOCIAL HISTORY:    Admitted from:  home assisted living [facility]   Substance abuse history:              Tobacco hx:                  Alcohol hx:              Home Opioid hx:  Zoroastrian:                                    Preferred Language:    Surrogate/HCP/Guardian:            Phone#:    FAMILY HISTORY:    Baseline ADLs (prior to admission):    Allergies    No Known Allergies    Intolerances      Present Symptoms:   Dyspnea:   Nausea/Vomiting:   Anxiety:  Depressed   Fatigue:  Loss of appetite:   Pain:                                location:          Review of Systems: [All others negative or Unable to obtain due to poor mentation]    MEDICATIONS  (STANDING):  ALBUTerol/ipratropium for Nebulization 3 milliLiter(s) Nebulizer every 6 hours  ascorbic acid 500 milliGRAM(s) Oral daily  buDESOnide   0.5 milliGRAM(s) Respule 0.5 milliGRAM(s) Nebulizer two times a day  buDESOnide 160 MICROgram(s)/formoterol 4.5 MICROgram(s) Inhaler 2 Puff(s) Inhalation two times a day  ferrous    sulfate 325 milliGRAM(s) Oral daily  gabapentin 600 milliGRAM(s) Oral at bedtime  heparin  Injectable 5000 Unit(s) SubCutaneous every 8 hours  mirtazapine 7.5 milliGRAM(s) Oral at bedtime  NIFEdipine XL 60 milliGRAM(s) Oral daily  predniSONE   Tablet 40 milliGRAM(s) Oral daily  QUEtiapine 25 milliGRAM(s) Oral two times a day  senna 2 Tablet(s) Oral at bedtime  sodium chloride 0.9% lock flush 3 milliLiter(s) IV Push every 8 hours  tiotropium 18 MICROgram(s) Capsule 1 Capsule(s) Inhalation daily    MEDICATIONS  (PRN):  acetaminophen   Tablet 650 milliGRAM(s) Oral every 6 hours PRN For Temp greater than 38 C (100.4 F)  ALBUTerol/ipratropium for Nebulization 3 milliLiter(s) Nebulizer every 6 hours PRN Shortness of Breath and/or Wheezing      PHYSICAL EXAM:    Vital Signs Last 24 Hrs  T(C): 36.4 (22 Jun 2018 10:31), Max: 37.1 (21 Jun 2018 21:57)  T(F): 97.6 (22 Jun 2018 10:31), Max: 98.8 (21 Jun 2018 21:57)  HR: 80 (22 Jun 2018 10:31) (74 - 92)  BP: 146/80 (22 Jun 2018 10:31) (146/80 - 160/86)  BP(mean): --  RR: 14 (22 Jun 2018 10:31) (14 - 16)  SpO2: 100% (22 Jun 2018 10:31) (95% - 100%)    General: alert  oriented x __1__      HEENT: normal    Lungs: comfortable   CV: normal   GI: normal                  : normal    Musculoskeletal:   weakness    Skin: normal    Neuro: no deficits cognitive impairment dsyphagia/dysarthria paresis: other:  Oral intake ability: unable/only mouth care [minimal moderate full capability]  Diet: [NPO]    LABS:                        9.0    6.0   )-----------( 162      ( 21 Jun 2018 05:05 )             30.0     06-21    143  |  108  |  23  ----------------------------<  81  4.0   |  29  |  1.31<H>    Ca    8.8      21 Jun 2018 05:05  Phos  3.6     06-21  Mg     2.1     06-21    TPro  6.5  /  Alb  3.0<L>  /  TBili  0.3  /  DBili  x   /  AST  21  /  ALT  21  /  AlkPhos  76  06-21        RADIOLOGY & ADDITIONAL STUDIES:    ADVANCE DIRECTIVES:   Advanced Care Planning discussion total time spent: HPI:  83F hx of dementia, HTN, COPD/asthma on chronic Prednisone, TAA pw from [facility] with hypoxemia with recorded sats in the 80s. Pt denied any specific complaint. Pt is alert but not oriented and can not recall any events. She denied any current SOB, CP, NVD, headache, dysuria.   case has been reviewed with Riky today.  He is not official health care proxy but other children are not involved in care of patient.  He is aware that advance directives need to be adressed      PAST MEDICAL & SURGICAL HISTORY:  Thoracic aortic aneurysm without rupture  Hypertension  Asthma  Depression  COPD (chronic obstructive pulmonary disease)  No significant past surgical history      SOCIAL HISTORY:    Admitted from:  home assisted living [facility]   Substance abuse history:              Tobacco hx:                  Alcohol hx:              Home Opioid hx:  Presybeterian:                                    Preferred Language:    Surrogate/HCP/Guardian:  Riky Doyle    FAMILY HISTORY: nc    Baseline ADLs (prior to admission):demented    Allergies    No Known Allergies  Present Symptoms:   Dyspnea: resolved  Nausea/Vomiting: no  Anxiety:no  Depressenod   Fatigue:no  Loss of appetite: n  Pain: no                               location:          Review of Systems: [All others negative or Unable to obtain due to poor mentation]    MEDICATIONS  (STANDING):  ALBUTerol/ipratropium for Nebulization 3 milliLiter(s) Nebulizer every 6 hours  ascorbic acid 500 milliGRAM(s) Oral daily  buDESOnide   0.5 milliGRAM(s) Respule 0.5 milliGRAM(s) Nebulizer two times a day  buDESOnide 160 MICROgram(s)/formoterol 4.5 MICROgram(s) Inhaler 2 Puff(s) Inhalation two times a day  ferrous    sulfate 325 milliGRAM(s) Oral daily  gabapentin 600 milliGRAM(s) Oral at bedtime  heparin  Injectable 5000 Unit(s) SubCutaneous every 8 hours  mirtazapine 7.5 milliGRAM(s) Oral at bedtime  NIFEdipine XL 60 milliGRAM(s) Oral daily  predniSONE   Tablet 40 milliGRAM(s) Oral daily  QUEtiapine 25 milliGRAM(s) Oral two times a day  senna 2 Tablet(s) Oral at bedtime  sodium chloride 0.9% lock flush 3 milliLiter(s) IV Push every 8 hours  tiotropium 18 MICROgram(s) Capsule 1 Capsule(s) Inhalation daily    MEDICATIONS  (PRN):  acetaminophen   Tablet 650 milliGRAM(s) Oral every 6 hours PRN For Temp greater than 38 C (100.4 F)  ALBUTerol/ipratropium for Nebulization 3 milliLiter(s) Nebulizer every 6 hours PRN Shortness of Breath and/or Wheezing      PHYSICAL EXAM:    Vital Signs Last 24 Hrs  T(C): 36.4 (22 Jun 2018 10:31), Max: 37.1 (21 Jun 2018 21:57)  T(F): 97.6 (22 Jun 2018 10:31), Max: 98.8 (21 Jun 2018 21:57)  HR: 80 (22 Jun 2018 10:31) (74 - 92)  BP: 146/80 (22 Jun 2018 10:31) (146/80 - 160/86)  BP(mean): --  RR: 14 (22 Jun 2018 10:31) (14 - 16)  SpO2: 100% (22 Jun 2018 10:31) (95% - 100%)    General: alert  oriented x __1__      HEENT: normal    Lungs: comfortable   CV: normal   GI: normal                  : normal    Musculoskeletal:   weakness    Skin: normal    Neuro: cognitive impairment   Oral intake ability:  full capability]  Diet: [NPO]    LABS:                        9.0    6.0   )-----------( 162      ( 21 Jun 2018 05:05 )             30.0     06-21    143  |  108  |  23  ----------------------------<  81  4.0   |  29  |  1.31<H>    Ca    8.8      21 Jun 2018 05:05  Phos  3.6     06-21  Mg     2.1     06-21    TPro  6.5  /  Alb  3.0<L>  /  TBili  0.3  /  DBili  x   /  AST  21  /  ALT  21  /  AlkPhos  76  06-21        RADIOLOGY & ADDITIONAL STUDIES:    ADVANCE DIRECTIVES:   Advanced Care Planning discussion total time spent: 20 minutes

## 2018-06-22 NOTE — DIETITIAN INITIAL EVALUATION ADULT. - ENERGY NEEDS
Ht: 60 inches Wt: 125.4 pounds BMI: 24.4 kg/m2 IBW: 100 (+/-10%) 125.4 %IBW  Pertinent information: Pt 82 y/o F with PMH: HTN, dementia, COPD, asthma, TAA, depression, admitted with hypoxemia, SOB, found with COPD exacerbation.   No noted edema as per flow sheets. Skin: no noted pressure injuries as per documentation.

## 2018-06-22 NOTE — CONSULT NOTE ADULT - CONSULT REASON
Initial HPI on admission:  HPI:  83F hx of dementia, HTN, COPD/asthma on chronic Prednisone, TAA pw from [facility] with hypoxemia with recorded sats in the 80s. Pt denied any specific complaint. Pt is alert but not oriented and can not recall any events. She denied any current SOB, CP, NVD, headache, dysuria.

## 2018-06-22 NOTE — PROGRESS NOTE ADULT - PROBLEM SELECTOR PLAN 5
pt. with known aneurysm  Follow up outpatient with Vascular - Dr. Galvan Cont seroquel  Palliative consult - Dr. Schultz pending

## 2018-06-22 NOTE — DIETITIAN INITIAL EVALUATION ADULT. - NS AS NUTRI INTERV ED CONTENT
Provided education on COPD nutrition therapy. Described COPD as a progressive disease. Provided recommendations to increase PO intake, recommended small frequent meals. Recommended nutritional supplements when PO intake <50%. Pt amenable for education. RD remains available.

## 2018-06-22 NOTE — PROGRESS NOTE ADULT - PROBLEM SELECTOR PLAN 6
Cont seroquel  Palliative consult - Dr. Schultz pending 5.1 cm. vascular follow up as outpatient , instructed to patient

## 2018-06-22 NOTE — PROGRESS NOTE ADULT - PROBLEM SELECTOR PLAN 1
Most likely 2/2 COPD exacerbation, however elevated D-Dimer  VQ Scan to r/o PE  Pulmonology - Dr. Espinosa following pt.   Cont. Nebs and steroids Most likely 2/2 COPD exacerbation, however elevated D-Dimer  VQ Scan to r/o PE could not be done as patient could not comply with exam.   Pulmonology - Dr. Espinosa following pt.   Cont. Nebs and steroids

## 2018-06-22 NOTE — DIETITIAN INITIAL EVALUATION ADULT. - PROBLEM SELECTOR PLAN 1
no obvious CHF and no obvious COPD exac.   With sig elevated d-dimer - check VQ scan (elev creatinine)  s/p 1 dose of lovenox in ER

## 2018-06-22 NOTE — PROGRESS NOTE ADULT - ATTENDING COMMENTS
Palliative care consult  Estefania Ivey MD
as above  d/w son- no decision on advance directives  advised MOLST form
follow up V/Q scan r/o PE   If negative, would consider discharging home as pt is no longer SOB , saturating well

## 2018-06-22 NOTE — DIETITIAN INITIAL EVALUATION ADULT. - ADHERENCE
Pt denies following any type of diet or restriction at home. Reports taking Ferrous Sulfate, Slow-Mag, and vitamin C. Noted pt on Prednisone PTA as per chart. Denies weight changes PTA./n/a

## 2018-06-22 NOTE — PROGRESS NOTE ADULT - SUBJECTIVE AND OBJECTIVE BOX
Patient is a 83y old  Female who presents with a chief complaint of hypoxemia, SOB (20 Jun 2018 19:50)      Patient seen and examined at bedside.    ALLERGIES:  No Known Allergies    MEDICATIONS:  acetaminophen   Tablet 650 milliGRAM(s) Oral every 6 hours PRN  ALBUTerol/ipratropium for Nebulization 3 milliLiter(s) Nebulizer every 6 hours  ALBUTerol/ipratropium for Nebulization 3 milliLiter(s) Nebulizer every 6 hours PRN  ascorbic acid 500 milliGRAM(s) Oral daily  buDESOnide   0.5 milliGRAM(s) Respule 0.5 milliGRAM(s) Nebulizer two times a day  buDESOnide 160 MICROgram(s)/formoterol 4.5 MICROgram(s) Inhaler 2 Puff(s) Inhalation two times a day  ferrous    sulfate 325 milliGRAM(s) Oral daily  gabapentin 600 milliGRAM(s) Oral at bedtime  heparin  Injectable 5000 Unit(s) SubCutaneous every 8 hours  mirtazapine 7.5 milliGRAM(s) Oral at bedtime  NIFEdipine XL 60 milliGRAM(s) Oral daily  predniSONE   Tablet 40 milliGRAM(s) Oral daily  QUEtiapine 25 milliGRAM(s) Oral two times a day  senna 2 Tablet(s) Oral at bedtime  sodium chloride 0.9% lock flush 3 milliLiter(s) IV Push every 8 hours  tiotropium 18 MICROgram(s) Capsule 1 Capsule(s) Inhalation daily    Vital Signs Last 24 Hrs  T(C): 36.4 (22 Jun 2018 10:31), Max: 37.1 (21 Jun 2018 21:57)  T(F): 97.6 (22 Jun 2018 10:31), Max: 98.8 (21 Jun 2018 21:57)  HR: 80 (22 Jun 2018 10:31) (74 - 92)  BP: 146/80 (22 Jun 2018 10:31) (146/80 - 160/86)  BP(mean): --  RR: 14 (22 Jun 2018 10:31) (14 - 16)  SpO2: 100% (22 Jun 2018 10:31) (95% - 100%)  I&O's Summary    21 Jun 2018 07:01  -  22 Jun 2018 07:00  --------------------------------------------------------  IN: 120 mL / OUT: 4 mL / NET: 116 mL    22 Jun 2018 07:01  -  22 Jun 2018 12:49  --------------------------------------------------------  IN: 200 mL / OUT: 0 mL / NET: 200 mL        PHYSICAL EXAM:  General: NAD, A/O x3  ENT: MMM  Neck: Supple, No JVD  Lungs: Clear to percussion bilaterally  Cardio: RRR, S1/S2, No murmurs  Abdomen: Soft, Nontender, Nondistended; Bowel sounds present  Extremities: No clubbing, cyanosis, or edema    LABS:                        9.0    6.0   )-----------( 162      ( 21 Jun 2018 05:05 )             30.0     06-21    143  |  108  |  23  ----------------------------<  81  4.0   |  29  |  1.31    Ca    8.8      21 Jun 2018 05:05  Phos  3.6     06-21  Mg     2.1     06-21    TPro  6.5  /  Alb  3.0  /  TBili  0.3  /  DBili  x   /  AST  21  /  ALT  21  /  AlkPhos  76  06-21    PT/INR - ( 20 Jun 2018 17:55 )   PT: 11.3 sec;   INR: 1.02 ratio         PTT - ( 20 Jun 2018 17:55 )  PTT:25.0 sec            CAPILLARY BLOOD GLUCOSE        06-21 [id number] 5.3< from: CT Chest No Cont (06.21.18 @ 16:20) >    History hypoxia    Limited evaluation of the upper abdomen demonstrates an infrarenal   abdominal aortic aneurysm measuring 5.1 cm in AP dimension. The   descending thoracic aorta is slightly ectatic and tortuous but the   ascending aorta is normal in caliber.    There is mild to moderate diffuse emphysema without dominant bullous   change. There is no segmental consolidation or pleural or pericardial   effusion. There is mild cardiomegaly. There is mild linear atelectasis in   the lung bases. There is no robert central edema or cavitation    IMPRESSION:    Emphysema without acute findings. Abdominal aortic aneurysm        < end of copied text >            RADIOLOGY & ADDITIONAL TESTS:     Care Discussed with Consultants/Other Providers: Pulmonology - Dr. Espinosa; Palliative - Dr. Schultz Patient is a 83y old  Female who presents with a chief complaint of hypoxemia, SOB      Patient seen and examined at bedside.    ALLERGIES:  No Known Allergies    MEDICATIONS:  acetaminophen   Tablet 650 milliGRAM(s) Oral every 6 hours PRN  ALBUTerol/ipratropium for Nebulization 3 milliLiter(s) Nebulizer every 6 hours  ALBUTerol/ipratropium for Nebulization 3 milliLiter(s) Nebulizer every 6 hours PRN  ascorbic acid 500 milliGRAM(s) Oral daily  buDESOnide   0.5 milliGRAM(s) Respule 0.5 milliGRAM(s) Nebulizer two times a day  buDESOnide 160 MICROgram(s)/formoterol 4.5 MICROgram(s) Inhaler 2 Puff(s) Inhalation two times a day  ferrous    sulfate 325 milliGRAM(s) Oral daily  gabapentin 600 milliGRAM(s) Oral at bedtime  heparin  Injectable 5000 Unit(s) SubCutaneous every 8 hours  mirtazapine 7.5 milliGRAM(s) Oral at bedtime  NIFEdipine XL 60 milliGRAM(s) Oral daily  predniSONE   Tablet 40 milliGRAM(s) Oral daily  QUEtiapine 25 milliGRAM(s) Oral two times a day  senna 2 Tablet(s) Oral at bedtime  sodium chloride 0.9% lock flush 3 milliLiter(s) IV Push every 8 hours  tiotropium 18 MICROgram(s) Capsule 1 Capsule(s) Inhalation daily    Vital Signs Last 24 Hrs  T(C): 36.4 (22 Jun 2018 10:31), Max: 37.1 (21 Jun 2018 21:57)  T(F): 97.6 (22 Jun 2018 10:31), Max: 98.8 (21 Jun 2018 21:57)  HR: 80 (22 Jun 2018 10:31) (74 - 92)  BP: 146/80 (22 Jun 2018 10:31) (146/80 - 160/86)  BP(mean): --  RR: 14 (22 Jun 2018 10:31) (14 - 16)  SpO2: 100% (22 Jun 2018 10:31) (95% - 100%)  I&O's Summary    21 Jun 2018 07:01  -  22 Jun 2018 07:00  --------------------------------------------------------  IN: 120 mL / OUT: 4 mL / NET: 116 mL    22 Jun 2018 07:01  -  22 Jun 2018 12:49  --------------------------------------------------------  IN: 200 mL / OUT: 0 mL / NET: 200 mL        PHYSICAL EXAM:  General: NAD, A/O x3  ENT: MMM  Neck: Supple, No JVD  Lungs: Clear to percussion bilaterally  Cardio: RRR, S1/S2, No murmurs  Abdomen: Soft, Nontender, Nondistended; Bowel sounds present  Extremities: No clubbing, cyanosis, or edema    LABS:                        9.0    6.0   )-----------( 162      ( 21 Jun 2018 05:05 )             30.0     06-21    143  |  108  |  23  ----------------------------<  81  4.0   |  29  |  1.31    Ca    8.8      21 Jun 2018 05:05  Phos  3.6     06-21  Mg     2.1     06-21    TPro  6.5  /  Alb  3.0  /  TBili  0.3  /  DBili  x   /  AST  21  /  ALT  21  /  AlkPhos  76  06-21    PT/INR - ( 20 Jun 2018 17:55 )   PT: 11.3 sec;   INR: 1.02 ratio         PTT - ( 20 Jun 2018 17:55 )  PTT:25.0 sec            CAPILLARY BLOOD GLUCOSE        06-21 [id number] 5.3< from: CT Chest No Cont (06.21.18 @ 16:20) >    History hypoxia    Limited evaluation of the upper abdomen demonstrates an infrarenal   abdominal aortic aneurysm measuring 5.1 cm in AP dimension. The   descending thoracic aorta is slightly ectatic and tortuous but the   ascending aorta is normal in caliber.    There is mild to moderate diffuse emphysema without dominant bullous   change. There is no segmental consolidation or pleural or pericardial   effusion. There is mild cardiomegaly. There is mild linear atelectasis in   the lung bases. There is no robert central edema or cavitation    IMPRESSION:    Emphysema without acute findings. Abdominal aortic aneurysm        < end of copied text >            RADIOLOGY & ADDITIONAL TESTS:     Care Discussed with Consultants/Other Providers: Pulmonology - Dr. Espinosa; Palliative - Dr. Schultz

## 2018-06-22 NOTE — PROGRESS NOTE ADULT - SUBJECTIVE AND OBJECTIVE BOX
Follow-up Pulm Progress Note    Much more awake and aler today.     Medications:  MEDICATIONS  (STANDING):  ALBUTerol/ipratropium for Nebulization 3 milliLiter(s) Nebulizer every 6 hours  ascorbic acid 500 milliGRAM(s) Oral daily  buDESOnide   0.5 milliGRAM(s) Respule 0.5 milliGRAM(s) Nebulizer two times a day  buDESOnide 160 MICROgram(s)/formoterol 4.5 MICROgram(s) Inhaler 2 Puff(s) Inhalation two times a day  ferrous    sulfate 325 milliGRAM(s) Oral daily  gabapentin 600 milliGRAM(s) Oral at bedtime  heparin  Injectable 5000 Unit(s) SubCutaneous every 8 hours  mirtazapine 7.5 milliGRAM(s) Oral at bedtime  NIFEdipine XL 60 milliGRAM(s) Oral daily  predniSONE   Tablet 40 milliGRAM(s) Oral daily  QUEtiapine 25 milliGRAM(s) Oral two times a day  senna 2 Tablet(s) Oral at bedtime  sodium chloride 0.9% lock flush 3 milliLiter(s) IV Push every 8 hours  tiotropium 18 MICROgram(s) Capsule 1 Capsule(s) Inhalation daily    MEDICATIONS  (PRN):  acetaminophen   Tablet 650 milliGRAM(s) Oral every 6 hours PRN For Temp greater than 38 C (100.4 F)  ALBUTerol/ipratropium for Nebulization 3 milliLiter(s) Nebulizer every 6 hours PRN Shortness of Breath and/or Wheezing    Vital Signs Last 24 Hrs  T(C): 36.4 (22 Jun 2018 10:31), Max: 37.1 (21 Jun 2018 21:57)  T(F): 97.6 (22 Jun 2018 10:31), Max: 98.8 (21 Jun 2018 21:57)  HR: 80 (22 Jun 2018 10:31) (71 - 92)  BP: 146/80 (22 Jun 2018 10:31) (146/80 - 167/90)  BP(mean): --  RR: 14 (22 Jun 2018 10:31) (14 - 16)  SpO2: 100% (22 Jun 2018 10:31) (95% - 100%)    06-21 @ 07:01  -  06-22 @ 07:00  --------------------------------------------------------  IN: 120 mL / OUT: 4 mL / NET: 116 mL    LABS:                        9.0    6.0   )-----------( 162      ( 21 Jun 2018 05:05 )             30.0     06-21    143  |  108  |  23  ----------------------------<  81  4.0   |  29  |  1.31<H>    Ca    8.8      21 Jun 2018 05:05  Phos  3.6     06-21  Mg     2.1     06-21    TPro  6.5  /  Alb  3.0<L>  /  TBili  0.3  /  DBili  x   /  AST  21  /  ALT  21  /  AlkPhos  76  06-21      CARDIAC MARKERS ( 21 Jun 2018 04:25 )  <.017 ng/mL / x     / x     / x     / x      CARDIAC MARKERS ( 20 Jun 2018 23:00 )  <.017 ng/mL / x     / x     / x     / x      CARDIAC MARKERS ( 20 Jun 2018 17:55 )  <.017 ng/mL / x     / x     / x     / x          CAPILLARY BLOOD GLUCOSE    PT/INR - ( 20 Jun 2018 17:55 )   PT: 11.3 sec;   INR: 1.02 ratio         PTT - ( 20 Jun 2018 17:55 )  PTT:25.0 sec      Serum Pro-Brain Natriuretic Peptide: 247 pg/mL (06-20-18 @ 17:55)    CULTURES: (if applicable)    Physical Examination:  PULM: Decreased BS at bases  CVS: S1, S2 heard    RADIOLOGY REVIEWED  CXR: clear    CT chest: emphysema    TTE:

## 2018-06-22 NOTE — CONSULT NOTE ADULT - ASSESSMENT
Assessment and Plan:   · Assessment		  83 with COPD exAssessment and Plan:   · Assessment		  83 with COPD exacerbation, dementia, former smoker, hypoxia    Problem/Plan - 1:  ·  Problem: Hypoxia.  Plan: back to baseline o2 requirements.     Problem/Plan - 2:  ·  Problem: Chronic obstructive pulmonary disease, unspecified COPD type.  Plan: started inhalers 6/21.     Problem/Plan - 3:  ·  Problem: Shortness of breath.     Problem/Plan - 4:  ·  Problem: Personal history of tobacco use.     Problem/Plan - 5:  ·  Problem: Essential hypertension. acerbation, dementia, former smoker, hypoxia    Problem/Plan - 1:  ·  Problem: Hypoxia.  Plan: back to baseline o2 requirements.     Problem/Plan - 2:  ·  Problem: Chronic obstructive pulmonary disease, unspecified COPD type.  Plan: started inhalers 6/21.     Problem/Plan - 3:  ·  Problem: Shortness of breath.     Problem/Plan - 4:  ·  Problem: Personal history of tobacco use.     Problem/Plan - 5:  ·  Problem: Essential hypertension. Assessment and Plan:   · Assessment		  83 with COPD exAssessment and Plan:   · Assessment		  83 with COPD exacerbation, dementia, former smoker, hypoxia    Problem/Plan - 1:  ·  Problem: Hypoxia.  Plan: back to baseline o2 requirements.     Problem/Plan - 2:  ·  Problem: Chronic obstructive pulmonary disease, unspecified COPD type.  Plan: started inhalers 6/21.     Problem/Plan - 3:  ·  Problem: Shortness of breath.     Problem/Plan - 4:  ·  Problem: Personal history of tobacco use.     Problem/Plan - 5:  ·  Problem: Essential hypertension. acerbation, dementia, former smoker, hypoxia    Problem/Plan - 1:  ·  Problem: Hypoxia.  Plan: back to baseline o2 requirements.     Problem/Plan - 2:  ·  Problem: Chronic obstructive pulmonary disease, unspecified COPD type.  Plan: started inhalers 6/21.     Problem/Plan - 3:  ·  Problem: Shortness of breath.     Problem/Plan - 4:  ·  Problem: Personal history of tobacco use.     Problem/Plan - 5:  ·  Problem: Essential hypertension.     Palliative Care reviewed with Riky.  He is aware that advanced directives have to be addressed .  He wants cpr but no artificial life support.  He is willing to discuss this further at future time

## 2018-06-22 NOTE — DIETITIAN INITIAL EVALUATION ADULT. - OTHER INFO
Nutrition consult received for nutrition initial assessment. Pt reports good appetite and PO intake. Denies difficulty chewing/swallowing. Pt denies nausea, vomiting, diarrhea, or constipation. Reports last BM today (06/22). Noted weight as per flow sheets: (06/20) 133.3 pounds -> (06/22) 125.4 pounds, ?accuracy of weight changes, recommend obtain current weight.

## 2018-06-23 ENCOUNTER — TRANSCRIPTION ENCOUNTER (OUTPATIENT)
Age: 83
End: 2018-06-23

## 2018-06-23 VITALS
OXYGEN SATURATION: 96 % | SYSTOLIC BLOOD PRESSURE: 131 MMHG | TEMPERATURE: 98 F | RESPIRATION RATE: 14 BRPM | HEART RATE: 91 BPM | DIASTOLIC BLOOD PRESSURE: 74 MMHG

## 2018-06-23 LAB
ANION GAP SERPL CALC-SCNC: 6 MMOL/L — SIGNIFICANT CHANGE UP (ref 5–17)
BUN SERPL-MCNC: 28 MG/DL — HIGH (ref 7–23)
CALCIUM SERPL-MCNC: 9 MG/DL — SIGNIFICANT CHANGE UP (ref 8.4–10.5)
CHLORIDE SERPL-SCNC: 107 MMOL/L — SIGNIFICANT CHANGE UP (ref 96–108)
CO2 SERPL-SCNC: 31 MMOL/L — SIGNIFICANT CHANGE UP (ref 22–31)
CREAT SERPL-MCNC: 1.66 MG/DL — HIGH (ref 0.5–1.3)
GLUCOSE SERPL-MCNC: 109 MG/DL — HIGH (ref 70–99)
HCT VFR BLD CALC: 30.2 % — LOW (ref 34.5–45)
HGB BLD-MCNC: 9.5 G/DL — LOW (ref 11.5–15.5)
MCHC RBC-ENTMCNC: 25.2 PG — LOW (ref 27–34)
MCHC RBC-ENTMCNC: 31.5 GM/DL — LOW (ref 32–36)
MCV RBC AUTO: 80 FL — SIGNIFICANT CHANGE UP (ref 80–100)
PLATELET # BLD AUTO: 185 K/UL — SIGNIFICANT CHANGE UP (ref 150–400)
POTASSIUM SERPL-MCNC: 3.7 MMOL/L — SIGNIFICANT CHANGE UP (ref 3.5–5.3)
POTASSIUM SERPL-SCNC: 3.7 MMOL/L — SIGNIFICANT CHANGE UP (ref 3.5–5.3)
RBC # BLD: 3.77 M/UL — LOW (ref 3.8–5.2)
RBC # FLD: 17.6 % — HIGH (ref 10.3–14.5)
SODIUM SERPL-SCNC: 144 MMOL/L — SIGNIFICANT CHANGE UP (ref 135–145)
WBC # BLD: 5.6 K/UL — SIGNIFICANT CHANGE UP (ref 3.8–10.5)
WBC # FLD AUTO: 5.6 K/UL — SIGNIFICANT CHANGE UP (ref 3.8–10.5)

## 2018-06-23 PROCEDURE — 83036 HEMOGLOBIN GLYCOSYLATED A1C: CPT

## 2018-06-23 PROCEDURE — 93306 TTE W/DOPPLER COMPLETE: CPT

## 2018-06-23 PROCEDURE — 84100 ASSAY OF PHOSPHORUS: CPT

## 2018-06-23 PROCEDURE — 84484 ASSAY OF TROPONIN QUANT: CPT

## 2018-06-23 PROCEDURE — 99285 EMERGENCY DEPT VISIT HI MDM: CPT | Mod: 25

## 2018-06-23 PROCEDURE — 85610 PROTHROMBIN TIME: CPT

## 2018-06-23 PROCEDURE — 83880 ASSAY OF NATRIURETIC PEPTIDE: CPT

## 2018-06-23 PROCEDURE — 99239 HOSP IP/OBS DSCHRG MGMT >30: CPT

## 2018-06-23 PROCEDURE — 82140 ASSAY OF AMMONIA: CPT

## 2018-06-23 PROCEDURE — 80053 COMPREHEN METABOLIC PANEL: CPT

## 2018-06-23 PROCEDURE — 85730 THROMBOPLASTIN TIME PARTIAL: CPT

## 2018-06-23 PROCEDURE — 85027 COMPLETE CBC AUTOMATED: CPT

## 2018-06-23 PROCEDURE — 97161 PT EVAL LOW COMPLEX 20 MIN: CPT

## 2018-06-23 PROCEDURE — 93010 ELECTROCARDIOGRAM REPORT: CPT

## 2018-06-23 PROCEDURE — 78582 LUNG VENTILAT&PERFUS IMAGING: CPT

## 2018-06-23 PROCEDURE — 93970 EXTREMITY STUDY: CPT

## 2018-06-23 PROCEDURE — 71046 X-RAY EXAM CHEST 2 VIEWS: CPT

## 2018-06-23 PROCEDURE — 93005 ELECTROCARDIOGRAM TRACING: CPT

## 2018-06-23 PROCEDURE — 83735 ASSAY OF MAGNESIUM: CPT

## 2018-06-23 PROCEDURE — 94640 AIRWAY INHALATION TREATMENT: CPT

## 2018-06-23 PROCEDURE — 80048 BASIC METABOLIC PNL TOTAL CA: CPT

## 2018-06-23 PROCEDURE — 71045 X-RAY EXAM CHEST 1 VIEW: CPT

## 2018-06-23 PROCEDURE — 85379 FIBRIN DEGRADATION QUANT: CPT

## 2018-06-23 PROCEDURE — 71250 CT THORAX DX C-: CPT

## 2018-06-23 RX ORDER — ACETAMINOPHEN 500 MG
2 TABLET ORAL
Qty: 0 | Refills: 0 | COMMUNITY
Start: 2018-06-23

## 2018-06-23 RX ADMIN — QUETIAPINE FUMARATE 25 MILLIGRAM(S): 200 TABLET, FILM COATED ORAL at 05:44

## 2018-06-23 RX ADMIN — Medication 325 MILLIGRAM(S): at 13:46

## 2018-06-23 RX ADMIN — Medication 60 MILLIGRAM(S): at 05:42

## 2018-06-23 RX ADMIN — HEPARIN SODIUM 5000 UNIT(S): 5000 INJECTION INTRAVENOUS; SUBCUTANEOUS at 05:42

## 2018-06-23 RX ADMIN — Medication 40 MILLIGRAM(S): at 05:45

## 2018-06-23 RX ADMIN — Medication 0.5 MILLIGRAM(S): at 09:30

## 2018-06-23 RX ADMIN — Medication 3 MILLILITER(S): at 09:34

## 2018-06-23 RX ADMIN — Medication 3 MILLILITER(S): at 03:45

## 2018-06-23 RX ADMIN — SODIUM CHLORIDE 3 MILLILITER(S): 9 INJECTION INTRAMUSCULAR; INTRAVENOUS; SUBCUTANEOUS at 05:41

## 2018-06-23 RX ADMIN — Medication 500 MILLIGRAM(S): at 13:46

## 2018-06-23 NOTE — DISCHARGE NOTE ADULT - MEDICATION SUMMARY - MEDICATIONS TO TAKE
I will START or STAY ON the medications listed below when I get home from the hospital:    budesonide 0.5 mg/2 mL inhalation suspension  -- 2 milliliter(s) inhaled 2 times a day  -- Indication: For COPD (chronic obstructive pulmonary disease)    predniSONE 20 mg oral tablet  -- 1 tab(s) by mouth once a day for 6/24/18, 6/25/18 and 6/26/18  -- It is very important that you take or use this exactly as directed.  Do not skip doses or discontinue unless directed by your doctor.  Obtain medical advice before taking any non-prescription drugs as some may affect the action of this medication.  Take with food or milk.    -- Indication: For COPD (chronic obstructive pulmonary disease)    predniSONE 10 mg oral tablet  -- 1 tab(s) by mouth once a day  once a day for 6/27/18, 6/28/18 and 6/29/18   -- It is very important that you take or use this exactly as directed.  Do not skip doses or discontinue unless directed by your doctor.  Obtain medical advice before taking any non-prescription drugs as some may affect the action of this medication.  Take with food or milk.    -- Indication: For COPD (chronic obstructive pulmonary disease)    predniSONE 5 mg oral tablet  -- 1 tab(s) by mouth once a day - start 6/30/18  -- It is very important that you take or use this exactly as directed.  Do not skip doses or discontinue unless directed by your doctor.  Obtain medical advice before taking any non-prescription drugs as some may affect the action of this medication.  Take with food or milk.    -- Indication: For COPD (chronic obstructive pulmonary disease)    acetaminophen 325 mg oral tablet  -- 2 tab(s) by mouth every 6 hours, As needed, For Temp greater than 38 C (100.4 F)  -- Indication: For Pain    gabapentin 300 mg oral tablet  -- 600 milligram(s) by mouth once a day (at bedtime)  -- Indication: For Pain    mirtazapine 15 mg oral tablet, disintegrating  -- 0.5 tab(s) by mouth once a day (at bedtime)  -- Indication: For Insomnia    QUEtiapine 25 mg oral tablet  -- orally 2 times a day  -- Indication: For Depression    NIFEdipine 60 mg oral tablet, extended release  -- 1 tab(s) by mouth once a day  -- Indication: For HTN    ferrous sulfate 325 mg (65 mg elemental iron) oral tablet  -- orally once a day  -- Indication: For Anemia, unspecified type    Senna 8.6 mg oral tablet  -- orally 2 times a day  -- Indication: For COnstipation    Slow-Mag 119 mg-71.5 mg oral delayed release tablet  -- 1 tab(s) by mouth once a day  -- Indication: For Supplement    Vitamin C 500 mg oral capsule  -- 1 cap(s) by mouth once a day  -- Indication: For Supplement

## 2018-06-23 NOTE — DISCHARGE NOTE ADULT - PLAN OF CARE
check for changes Follow up outpatient with Vascular - Dr. Galvan resolved Prednisone tape 20 mg po for 3 days, 10 mg po for 3 days then 5 mg po daily maintain adequate O2Sat prednisone taper control BP Controlled on current meds stable H/H continue ferrous sulfate maintain functionality Cont. Seroquel resolved, pt is comfortable Prednisone taper 20 mg po for 3 days, 10 mg po for 3 days then 5 mg po daily f/u within 1 week, given high d dimer and size of AAA 5.1 cm Follow up outpatient with Vascular - Dr. Galvan, should be within 1 week follow up with vascular in 1 week. may be 2 to AAA of 5.1 cm

## 2018-06-23 NOTE — PROGRESS NOTE ADULT - SUBJECTIVE AND OBJECTIVE BOX
Patient is a 83y old  Female who presents with a chief complaint of hypoxemia, SOB (20 Jun 2018 19:50)      Patient seen and examined at bedside.    ALLERGIES:  No Known Allergies    MEDICATIONS:  acetaminophen   Tablet 650 milliGRAM(s) Oral every 6 hours PRN  ALBUTerol/ipratropium for Nebulization 3 milliLiter(s) Nebulizer every 6 hours  ALBUTerol/ipratropium for Nebulization 3 milliLiter(s) Nebulizer every 6 hours PRN  ascorbic acid 500 milliGRAM(s) Oral daily  buDESOnide   0.5 milliGRAM(s) Respule 0.5 milliGRAM(s) Nebulizer two times a day  buDESOnide 160 MICROgram(s)/formoterol 4.5 MICROgram(s) Inhaler 2 Puff(s) Inhalation two times a day  ferrous    sulfate 325 milliGRAM(s) Oral daily  gabapentin 600 milliGRAM(s) Oral at bedtime  heparin  Injectable 5000 Unit(s) SubCutaneous every 8 hours  mirtazapine 7.5 milliGRAM(s) Oral at bedtime  NIFEdipine XL 60 milliGRAM(s) Oral daily  predniSONE   Tablet 40 milliGRAM(s) Oral daily  QUEtiapine 25 milliGRAM(s) Oral two times a day  senna 2 Tablet(s) Oral at bedtime  sodium chloride 0.9% lock flush 3 milliLiter(s) IV Push every 8 hours  tiotropium 18 MICROgram(s) Capsule 1 Capsule(s) Inhalation daily    Vital Signs Last 24 Hrs  T(C): 36.6 (23 Jun 2018 05:00), Max: 36.7 (22 Jun 2018 16:04)  T(F): 97.8 (23 Jun 2018 05:00), Max: 98.1 (22 Jun 2018 16:04)  HR: 90 (23 Jun 2018 05:00) (85 - 93)  BP: 140/92 (23 Jun 2018 05:00) (140/92 - 170/95)  BP(mean): --  RR: 15 (23 Jun 2018 05:00) (14 - 15)  SpO2: 98% (23 Jun 2018 09:31) (94% - 98%)  I&O's Summary    22 Jun 2018 07:01  -  23 Jun 2018 07:00  --------------------------------------------------------  IN: 535 mL / OUT: 0 mL / NET: 535 mL    23 Jun 2018 07:01  -  23 Jun 2018 10:33  --------------------------------------------------------  IN: 400 mL / OUT: 0 mL / NET: 400 mL        PHYSICAL EXAM:  General: NAD, A/O x3  ENT: MMM  Neck: Supple, No JVD  Lungs: Clear to percussion bilaterally  Cardio: RRR, S1/S2, No murmurs  Abdomen: Soft, Nontender, Nondistended; Bowel sounds present  Extremities: No clubbing, cyanosis, or edema    LABS:                        9.5    5.6   )-----------( 185      ( 23 Jun 2018 08:35 )             30.2     06-23    144  |  107  |  28  ----------------------------<  109  3.7   |  31  |  1.66    Ca    9.0      23 Jun 2018 08:35  Phos  3.6     06-21  Mg     2.1     06-21    TPro  6.5  /  Alb  3.0  /  TBili  0.3  /  DBili  x   /  AST  21  /  ALT  21  /  AlkPhos  76  06-21    PT/INR - ( 20 Jun 2018 17:55 )   PT: 11.3 sec;   INR: 1.02 ratio         PTT - ( 20 Jun 2018 17:55 )  PTT:25.0 sec            CAPILLARY BLOOD GLUCOSE        06-21 [id number] 5.3          RADIOLOGY & ADDITIONAL TESTS:    Care Discussed with Consultants/Other Providers:

## 2018-06-23 NOTE — PHYSICAL THERAPY INITIAL EVALUATION ADULT - RANGE OF MOTION EXAMINATION, REHAB EVAL
deficits as listed below/bilateral lower extremity was ROM was WNL (within normal limits)/bilateral shoulder flexion and abduction limited to 90 degrees

## 2018-06-23 NOTE — PROGRESS NOTE ADULT - ASSESSMENT
Assessment  Acute COPD exacerbation  Elevated D-Dimer    pt unable to tolerate VQ scan  Underlying  Thoracic aortic aneurysm without rupture 4.1 CM in 2014, now increased to 5.1 CM  Hypertension  Asthma  Depression  Dementia    Plan  Continue Nebs, Budesonide Spiriva Symbicort  oral prednisone taper  unable to tolerate vq scan  unable to do cta due to elevated cr.   Noted AAA on CT - ? cause of elevated D-Dimer.  I have reached out to Vascular surg via transfer center if need intervention vs Out patient f/u. Assessment  Acute COPD exacerbation  Elevated D-Dimer    pt unable to tolerate VQ scan  Underlying  Thoracic aortic aneurysm without rupture 4.1 CM in 2014, now increased to 5.1 CM  Hypertension  Asthma  Depression  Dementia    Plan  Continue Nebs, Budesonide Spiriva Symbicort  oral prednisone taper  unable to tolerate vq scan  unable to do cta due to elevated cr.   Noted AAA on CT - ? cause of elevated D-Dimer.  I have reached out to Vascular surg via transfer center if need intervention vs Out patient f/u.  case d/w Dr. Koch

## 2018-06-23 NOTE — PROGRESS NOTE ADULT - PROBLEM SELECTOR PLAN 2
Controlled, cont nifedipine CR
Controlled, cont nifedipine CR
started inhalers 6/21
steroids, symbicort/spiriva.

## 2018-06-23 NOTE — PROGRESS NOTE ADULT - PROBLEM SELECTOR PLAN 4
H/H stable, will monitor  Cont ferrous sulfate
H/H stable, will monitor  Cont ferrous sulfate
continue Ferrous sulfate

## 2018-06-23 NOTE — PROGRESS NOTE ADULT - SUBJECTIVE AND OBJECTIVE BOX
Follow-up ICU Pulmonary Note  Chief Complaint : Shortness of breath    pt oob to chair no sob, states feeling better  No new events overnight.  Denies SOB/CP.       Allergies :No Known Allergies      PMH:  Thoracic aortic aneurysm without rupture  Hypertension  Asthma  Depression  COPD (chronic obstructive pulmonary disease)  No significant past surgical history      Medications:      LABS:                        9.5    5.6   )-----------( 185      ( 23 Jun 2018 08:35 )             30.2     06-23    144  |  107  |  28<H>  ----------------------------<  109<H>  3.7   |  31  |  1.66<H>    Ca    9.0      23 Jun 2018 08:35        Serum Pro-Brain Natriuretic Peptide: 247 pg/mL (06-20-18 @ 17:55)        CULTURES: (if applicable)          CAPILLARY BLOOD GLUCOSE          RADIOLOGY  CXR:  < from: Xray Chest 1 View-PORTABLE IMMEDIATE (06.22.18 @ 10:50) >  IMPRESSION: No acute finding.  < end of copied text >      CT:  < from: CT Chest No Cont (06.21.18 @ 16:20) >  Emphysema without acute findings. Abdominal aortic aneurysm 5.1cm  < end of copied text >    ECHO:  < from: TTE Echo Complete w/Doppler (06.21.18 @ 08:59) >   1. Left ventricular ejection fraction, by visual estimation, is 55 to 60%. limited views for technical reasons   2. Normal global left ventricular systolic function.   3. Normal right ventricular size and function.   4. There is no evidence of pericardial effusion.   5. Trace tricuspid regurgitation.   6. Sclerotic aortic valve with normal opening.   7. Dilatation of the descending aorta.   8. Increased relative wall thickness with normal mass index consistent with left ventricular concentric remodeling.  < end of copied text >      VITALS:  T(C): 36.4 (06-23-18 @ 11:34), Max: 36.7 (06-22-18 @ 16:04)  T(F): 97.5 (06-23-18 @ 11:34), Max: 98.1 (06-22-18 @ 16:04)  HR: 91 (06-23-18 @ 11:34) (85 - 93)  BP: 131/74 (06-23-18 @ 11:34) (131/74 - 170/95)  BP(mean): --  ABP: --  ABP(mean): --  RR: 14 (06-23-18 @ 11:34) (14 - 15)  SpO2: 96% (06-23-18 @ 11:34) (94% - 98%)  CVP(mm Hg): --  CVP(cm H2O): --    Ins and Outs     06-22-18 @ 07:01  -  06-23-18 @ 07:00  --------------------------------------------------------  IN: 535 mL / OUT: 0 mL / NET: 535 mL    06-23-18 @ 07:01  -  06-23-18 @ 14:06  --------------------------------------------------------  IN: 400 mL / OUT: 0 mL / NET: 400 mL            Physical Examination:    General: Alert, Oriented, No acute distress.      HEENT:   Symmetric. No JVD, Moist MM    PULM: Bilateral air entry, Clear to auscultation bilaterally, no significant sputum production, No Rales, No Rhonchi, No Wheezing    CVS: S1, S2,  + Murmurs    ABD: Soft, nondistended, nontender, normoactive bowel sounds,     EXT: Mild edema, nontender, No Cyanosis or Clubbing     Vascular: Warm Extremities, Normal Capillary refill, Normal Distal Pulses    SKIN: Warm and well perfused, no rashes noted.     NEURO: Alert, oriented, interactive, nonfocal, follows commands

## 2018-06-23 NOTE — DISCHARGE NOTE ADULT - SECONDARY DIAGNOSIS.
Chronic obstructive pulmonary disease, unspecified COPD type Essential hypertension Anemia, unspecified type Vascular dementia without behavioral disturbance Abdominal aneurysm Elevated d-dimer

## 2018-06-23 NOTE — PROGRESS NOTE ADULT - PROBLEM SELECTOR PROBLEM 3
Chronic obstructive pulmonary disease, unspecified COPD type
Chronic obstructive pulmonary disease, unspecified COPD type
Essential hypertension
Shortness of breath

## 2018-06-23 NOTE — DISCHARGE NOTE ADULT - CARE PROVIDER_API CALL
Vitaly Morrow (), Internal Medicine  [address] [place] [address]  Phone: [phone]  Fax: [phone]    Mark Galvan), Surgery  [address] [facility]  [address]  [place], [place] [id number]  Phone: [phone]  Fax: [phone]

## 2018-06-23 NOTE — PROGRESS NOTE ADULT - PROBLEM SELECTOR PROBLEM 2
Chronic obstructive pulmonary disease, unspecified COPD type
Chronic obstructive pulmonary disease, unspecified COPD type
Essential hypertension
Essential hypertension

## 2018-06-23 NOTE — DISCHARGE NOTE ADULT - HOSPITAL COURSE
82 y/o female with PMH of COPD, HTN, Anemia, Abdominal aneurysm p/w hypoxia. Treated with Neb and steroids, symptoms improved, pt. stable for discharge, will follow up with  - Dr. Morrow and Vascular Surgery - Dr. Galvan to monitor stability of abdominal aneurysm

## 2018-06-23 NOTE — PHYSICAL THERAPY INITIAL EVALUATION ADULT - MANUAL MUSCLE TESTING RESULTS, REHAB EVAL
bilateral UE and LE grossly 3+/5 t/o, except bilateral shoulders to 3-/5 t/o/no strength deficits were identified

## 2018-06-23 NOTE — DISCHARGE NOTE ADULT - MEDICATION SUMMARY - MEDICATIONS TO STOP TAKING
I will STOP taking the medications listed below when I get home from the hospital:    predniSONE 5 mg oral tablet  -- 1 tab(s) by mouth once a day

## 2018-06-23 NOTE — DISCHARGE NOTE ADULT - PATIENT PORTAL LINK FT
You can access the [facility] Patient Portal, offered by [facility], by registering with the following website: [email or web address]

## 2018-06-23 NOTE — DISCHARGE NOTE ADULT - OTHER SIGNIFICANT FINDINGS
elevated D-dimer. pt cannot get CT with contrast because of kidney function and failed having V/Q twice. Needs a repeat D dimer, if still elevated would consider trying CT angio if/when kidney function better

## 2018-06-23 NOTE — PROGRESS NOTE ADULT - PROBLEM SELECTOR PROBLEM 4
Anemia, unspecified type
Personal history of tobacco use
Anemia, unspecified type
Anemia, unspecified type

## 2018-06-23 NOTE — DISCHARGE NOTE ADULT - NS AS ACTIVITY OBS
Showering allowed/Walking-Indoors allowed/Walking-Outdoors allowed/Stairs allowed/Do not make important decisions/Bathing allowed/No Heavy lifting/straining/Do not drive or operate machinery

## 2018-06-23 NOTE — PROGRESS NOTE ADULT - PROBLEM SELECTOR PLAN 1
Most likely 2/2 COPD exacerbation, however elevated D-Dimer  VQ Scan to r/o PE could not be done as patient could not comply with exam.   Pulmonology - Dr. Espinosa following pt.   Cont. Nebs and steroids

## 2018-06-23 NOTE — PROGRESS NOTE ADULT - PROBLEM SELECTOR PLAN 3
Cont. nebs and steroids  Pulmonary - Dr. Espinosa following
Cont. nebs and steroids  Pulmonary - Dr. Espinosa following
cont nifedipine

## 2018-06-23 NOTE — DISCHARGE NOTE ADULT - CARE PLAN
Principal Discharge DX:	Acute respiratory distress  Goal:	resolved  Assessment and plan of treatment:	Prednisone tape 20 mg po for 3 days, 10 mg po for 3 days then 5 mg po daily  Secondary Diagnosis:	Chronic obstructive pulmonary disease, unspecified COPD type  Goal:	maintain adequate O2Sat  Assessment and plan of treatment:	prednisone taper  Secondary Diagnosis:	Essential hypertension  Goal:	control BP  Assessment and plan of treatment:	Controlled on current meds  Secondary Diagnosis:	Anemia, unspecified type  Goal:	stable H/H  Assessment and plan of treatment:	continue ferrous sulfate  Secondary Diagnosis:	Vascular dementia without behavioral disturbance  Goal:	maintain functionality  Assessment and plan of treatment:	Cont. Seroquel  Secondary Diagnosis:	Abdominal aneurysm  Goal:	check for changes  Assessment and plan of treatment:	Follow up outpatient with Vascular - Dr. Galvan Principal Discharge DX:	Acute respiratory distress  Goal:	resolved, pt is comfortable  Assessment and plan of treatment:	Prednisone taper 20 mg po for 3 days, 10 mg po for 3 days then 5 mg po daily  Secondary Diagnosis:	Chronic obstructive pulmonary disease, unspecified COPD type  Goal:	maintain adequate O2Sat  Assessment and plan of treatment:	prednisone taper  Secondary Diagnosis:	Essential hypertension  Goal:	control BP  Assessment and plan of treatment:	Controlled on current meds  Secondary Diagnosis:	Anemia, unspecified type  Goal:	stable H/H  Assessment and plan of treatment:	continue ferrous sulfate  Secondary Diagnosis:	Vascular dementia without behavioral disturbance  Goal:	maintain functionality  Assessment and plan of treatment:	Cont. Seroquel  Secondary Diagnosis:	Abdominal aneurysm  Goal:	f/u within 1 week, given high d dimer and size of AAA 5.1 cm  Assessment and plan of treatment:	Follow up outpatient with Vascular - Dr. Galvan, should be within 1 week  Secondary Diagnosis:	Elevated d-dimer  Goal:	follow up with vascular in 1 week. may be 2 to AAA of 5.1 cm

## 2018-06-23 NOTE — PHYSICAL THERAPY INITIAL EVALUATION ADULT - ADDITIONAL COMMENTS
Pt. resides in Long term care [facility].  Pt. requires assistance with ADL's and mobility.  Pt. uses glasses for reading and distance.

## 2018-07-14 ENCOUNTER — INPATIENT (INPATIENT)
Facility: HOSPITAL | Age: 83
LOS: 6 days | DRG: 870 | End: 2018-07-21
Attending: INTERNAL MEDICINE | Admitting: INTERNAL MEDICINE
Payer: MEDICARE

## 2018-07-14 VITALS
DIASTOLIC BLOOD PRESSURE: 133 MMHG | OXYGEN SATURATION: 93 % | WEIGHT: 149.91 LBS | RESPIRATION RATE: 33 BRPM | HEART RATE: 135 BPM | SYSTOLIC BLOOD PRESSURE: 210 MMHG

## 2018-07-14 DIAGNOSIS — R06.03 ACUTE RESPIRATORY DISTRESS: ICD-10-CM

## 2018-07-14 DIAGNOSIS — I10 ESSENTIAL (PRIMARY) HYPERTENSION: ICD-10-CM

## 2018-07-14 DIAGNOSIS — A41.9 SEPSIS, UNSPECIFIED ORGANISM: ICD-10-CM

## 2018-07-14 DIAGNOSIS — I71.2 THORACIC AORTIC ANEURYSM, WITHOUT RUPTURE: ICD-10-CM

## 2018-07-14 DIAGNOSIS — N18.9 CHRONIC KIDNEY DISEASE, UNSPECIFIED: ICD-10-CM

## 2018-07-14 DIAGNOSIS — N17.9 ACUTE KIDNEY FAILURE, UNSPECIFIED: ICD-10-CM

## 2018-07-14 DIAGNOSIS — J18.9 PNEUMONIA, UNSPECIFIED ORGANISM: ICD-10-CM

## 2018-07-14 DIAGNOSIS — J44.9 CHRONIC OBSTRUCTIVE PULMONARY DISEASE, UNSPECIFIED: ICD-10-CM

## 2018-07-14 LAB
ALBUMIN SERPL ELPH-MCNC: 3.2 G/DL — LOW (ref 3.3–5)
ALP SERPL-CCNC: 92 U/L — SIGNIFICANT CHANGE UP (ref 40–120)
ALT FLD-CCNC: 26 U/L DA — SIGNIFICANT CHANGE UP (ref 10–45)
ANION GAP SERPL CALC-SCNC: 10 MMOL/L — SIGNIFICANT CHANGE UP (ref 5–17)
ANION GAP SERPL CALC-SCNC: 12 MMOL/L — SIGNIFICANT CHANGE UP (ref 5–17)
APPEARANCE UR: CLEAR — SIGNIFICANT CHANGE UP
APTT BLD: 31 SEC — SIGNIFICANT CHANGE UP (ref 27.5–37.4)
AST SERPL-CCNC: 35 U/L — SIGNIFICANT CHANGE UP (ref 10–40)
BACTERIA # UR AUTO: ABNORMAL /HPF
BASOPHILS NFR BLD AUTO: 1 % — SIGNIFICANT CHANGE UP (ref 0–2)
BILIRUB SERPL-MCNC: 0.4 MG/DL — SIGNIFICANT CHANGE UP (ref 0.2–1.2)
BILIRUB UR-MCNC: NEGATIVE — SIGNIFICANT CHANGE UP
BLOOD GAS COMMENTS ARTERIAL: SIGNIFICANT CHANGE UP
BUN SERPL-MCNC: 36 MG/DL — HIGH (ref 7–23)
BUN SERPL-MCNC: 41 MG/DL — HIGH (ref 7–23)
CALCIUM SERPL-MCNC: 7.2 MG/DL — LOW (ref 8.4–10.5)
CALCIUM SERPL-MCNC: 9 MG/DL — SIGNIFICANT CHANGE UP (ref 8.4–10.5)
CHLORIDE SERPL-SCNC: 105 MMOL/L — SIGNIFICANT CHANGE UP (ref 96–108)
CHLORIDE SERPL-SCNC: 112 MMOL/L — HIGH (ref 96–108)
CO2 BLDA-SCNC: 21 MMOL/L — LOW (ref 22–30)
CO2 BLDA-SCNC: 24 MMOL/L — SIGNIFICANT CHANGE UP (ref 22–30)
CO2 SERPL-SCNC: 20 MMOL/L — LOW (ref 22–31)
CO2 SERPL-SCNC: 25 MMOL/L — SIGNIFICANT CHANGE UP (ref 22–31)
COLOR SPEC: YELLOW — SIGNIFICANT CHANGE UP
CREAT SERPL-MCNC: 2.12 MG/DL — HIGH (ref 0.5–1.3)
CREAT SERPL-MCNC: 2.42 MG/DL — HIGH (ref 0.5–1.3)
DIFF PNL FLD: ABNORMAL
EPI CELLS # UR: SIGNIFICANT CHANGE UP
GAS PNL BLDA: SIGNIFICANT CHANGE UP
GAS PNL BLDA: SIGNIFICANT CHANGE UP
GLUCOSE BLDC GLUCOMTR-MCNC: 180 MG/DL — HIGH (ref 70–99)
GLUCOSE SERPL-MCNC: 164 MG/DL — HIGH (ref 70–99)
GLUCOSE SERPL-MCNC: 177 MG/DL — HIGH (ref 70–99)
GLUCOSE UR QL: 50 MG/DL
GRAN CASTS # UR COMP ASSIST: ABNORMAL
HCT VFR BLD CALC: 31.7 % — LOW (ref 34.5–45)
HCT VFR BLD CALC: 35.9 % — SIGNIFICANT CHANGE UP (ref 34.5–45)
HGB BLD-MCNC: 10.8 G/DL — LOW (ref 11.5–15.5)
HGB BLD-MCNC: 9.7 G/DL — LOW (ref 11.5–15.5)
HOROWITZ INDEX BLDA+IHG-RTO: SIGNIFICANT CHANGE UP
HOROWITZ INDEX BLDA+IHG-RTO: SIGNIFICANT CHANGE UP
HYALINE CASTS # UR AUTO: ABNORMAL (ref 0–7)
INR BLD: 1.16 RATIO — SIGNIFICANT CHANGE UP (ref 0.88–1.16)
KETONES UR-MCNC: NEGATIVE — SIGNIFICANT CHANGE UP
LACTATE SERPL-SCNC: 1.2 MMOL/L — SIGNIFICANT CHANGE UP (ref 0.7–2)
LACTATE SERPL-SCNC: 1.7 MMOL/L — SIGNIFICANT CHANGE UP (ref 0.7–2)
LACTATE SERPL-SCNC: 3.7 MMOL/L — HIGH (ref 0.7–2)
LEUKOCYTE ESTERASE UR-ACNC: NEGATIVE — SIGNIFICANT CHANGE UP
LYMPHOCYTES # BLD AUTO: 2 % — LOW (ref 13–44)
MCHC RBC-ENTMCNC: 24.3 PG — LOW (ref 27–34)
MCHC RBC-ENTMCNC: 24.4 PG — LOW (ref 27–34)
MCHC RBC-ENTMCNC: 30 GM/DL — LOW (ref 32–36)
MCHC RBC-ENTMCNC: 30.4 GM/DL — LOW (ref 32–36)
MCV RBC AUTO: 80.1 FL — SIGNIFICANT CHANGE UP (ref 80–100)
MCV RBC AUTO: 81 FL — SIGNIFICANT CHANGE UP (ref 80–100)
MONOCYTES NFR BLD AUTO: 3 % — SIGNIFICANT CHANGE UP (ref 2–14)
NEUTROPHILS NFR BLD AUTO: 88 % — HIGH (ref 43–77)
NITRITE UR-MCNC: NEGATIVE — SIGNIFICANT CHANGE UP
PCO2 BLDA: 39 MMHG — SIGNIFICANT CHANGE UP (ref 32–46)
PCO2 BLDA: 47 MMHG — HIGH (ref 32–46)
PH BLDA: 7.3 — LOW (ref 7.35–7.45)
PH BLDA: 7.33 — LOW (ref 7.35–7.45)
PH UR: 6.5 — SIGNIFICANT CHANGE UP (ref 5–8)
PLATELET # BLD AUTO: 154 K/UL — SIGNIFICANT CHANGE UP (ref 150–400)
PLATELET # BLD AUTO: 197 K/UL — SIGNIFICANT CHANGE UP (ref 150–400)
PO2 BLDA: 64 MMHG — LOW (ref 74–108)
PO2 BLDA: 89 MMHG — SIGNIFICANT CHANGE UP (ref 74–108)
POTASSIUM SERPL-MCNC: 4.8 MMOL/L — SIGNIFICANT CHANGE UP (ref 3.5–5.3)
POTASSIUM SERPL-MCNC: 5.1 MMOL/L — SIGNIFICANT CHANGE UP (ref 3.5–5.3)
POTASSIUM SERPL-SCNC: 4.8 MMOL/L — SIGNIFICANT CHANGE UP (ref 3.5–5.3)
POTASSIUM SERPL-SCNC: 5.1 MMOL/L — SIGNIFICANT CHANGE UP (ref 3.5–5.3)
PROT SERPL-MCNC: 7.9 G/DL — SIGNIFICANT CHANGE UP (ref 6–8.3)
PROT UR-MCNC: 100
PROTHROM AB SERPL-ACNC: 12.9 SEC — HIGH (ref 9.8–12.7)
RBC # BLD: 3.96 M/UL — SIGNIFICANT CHANGE UP (ref 3.8–5.2)
RBC # BLD: 4.43 M/UL — SIGNIFICANT CHANGE UP (ref 3.8–5.2)
RBC # FLD: 18 % — HIGH (ref 10.3–14.5)
RBC # FLD: 18.5 % — HIGH (ref 10.3–14.5)
RBC CASTS # UR COMP ASSIST: ABNORMAL /HPF (ref 0–4)
SAO2 % BLDA: 88 % — LOW (ref 92–96)
SAO2 % BLDA: 96 % — SIGNIFICANT CHANGE UP (ref 92–96)
SODIUM SERPL-SCNC: 142 MMOL/L — SIGNIFICANT CHANGE UP (ref 135–145)
SODIUM SERPL-SCNC: 142 MMOL/L — SIGNIFICANT CHANGE UP (ref 135–145)
SP GR SPEC: 1.01 — SIGNIFICANT CHANGE UP (ref 1.01–1.02)
UROBILINOGEN FLD QL: NEGATIVE — SIGNIFICANT CHANGE UP
WBC # BLD: 25.1 K/UL — HIGH (ref 3.8–10.5)
WBC # BLD: 34.2 K/UL — HIGH (ref 3.8–10.5)
WBC # FLD AUTO: 25.1 K/UL — HIGH (ref 3.8–10.5)
WBC # FLD AUTO: 34.2 K/UL — HIGH (ref 3.8–10.5)
WBC UR QL: SIGNIFICANT CHANGE UP /HPF (ref 0–5)

## 2018-07-14 PROCEDURE — 99291 CRITICAL CARE FIRST HOUR: CPT | Mod: 25

## 2018-07-14 PROCEDURE — 71045 X-RAY EXAM CHEST 1 VIEW: CPT | Mod: 26

## 2018-07-14 PROCEDURE — 74174 CTA ABD&PLVS W/CONTRAST: CPT | Mod: 26

## 2018-07-14 PROCEDURE — 71275 CT ANGIOGRAPHY CHEST: CPT | Mod: 26

## 2018-07-14 PROCEDURE — 31500 INSERT EMERGENCY AIRWAY: CPT

## 2018-07-14 PROCEDURE — 93010 ELECTROCARDIOGRAM REPORT: CPT

## 2018-07-14 RX ORDER — PANTOPRAZOLE SODIUM 20 MG/1
40 TABLET, DELAYED RELEASE ORAL DAILY
Qty: 0 | Refills: 0 | Status: DISCONTINUED | OUTPATIENT
Start: 2018-07-14 | End: 2018-07-21

## 2018-07-14 RX ORDER — SODIUM CHLORIDE 9 MG/ML
1000 INJECTION INTRAMUSCULAR; INTRAVENOUS; SUBCUTANEOUS
Qty: 0 | Refills: 0 | Status: DISCONTINUED | OUTPATIENT
Start: 2018-07-14 | End: 2018-07-15

## 2018-07-14 RX ORDER — SODIUM CHLORIDE 9 MG/ML
1000 INJECTION, SOLUTION INTRAVENOUS
Qty: 0 | Refills: 0 | Status: DISCONTINUED | OUTPATIENT
Start: 2018-07-14 | End: 2018-07-14

## 2018-07-14 RX ORDER — SODIUM CHLORIDE 9 MG/ML
3 INJECTION INTRAMUSCULAR; INTRAVENOUS; SUBCUTANEOUS ONCE
Qty: 0 | Refills: 0 | Status: COMPLETED | OUTPATIENT
Start: 2018-07-14 | End: 2018-07-14

## 2018-07-14 RX ORDER — CHLORHEXIDINE GLUCONATE 213 G/1000ML
15 SOLUTION TOPICAL
Qty: 0 | Refills: 0 | Status: DISCONTINUED | OUTPATIENT
Start: 2018-07-14 | End: 2018-07-21

## 2018-07-14 RX ORDER — VANCOMYCIN HCL 1 G
750 VIAL (EA) INTRAVENOUS DAILY
Qty: 0 | Refills: 0 | Status: DISCONTINUED | OUTPATIENT
Start: 2018-07-14 | End: 2018-07-15

## 2018-07-14 RX ORDER — IPRATROPIUM BROMIDE 0.2 MG/ML
1 SOLUTION, NON-ORAL INHALATION EVERY 6 HOURS
Qty: 0 | Refills: 0 | Status: DISCONTINUED | OUTPATIENT
Start: 2018-07-14 | End: 2018-07-16

## 2018-07-14 RX ORDER — VANCOMYCIN HCL 1 G
VIAL (EA) INTRAVENOUS
Qty: 0 | Refills: 0 | Status: DISCONTINUED | OUTPATIENT
Start: 2018-07-14 | End: 2018-07-14

## 2018-07-14 RX ORDER — DEXTROSE 50 % IN WATER 50 %
25 SYRINGE (ML) INTRAVENOUS ONCE
Qty: 0 | Refills: 0 | Status: DISCONTINUED | OUTPATIENT
Start: 2018-07-14 | End: 2018-07-21

## 2018-07-14 RX ORDER — DEXTROSE 50 % IN WATER 50 %
15 SYRINGE (ML) INTRAVENOUS ONCE
Qty: 0 | Refills: 0 | Status: DISCONTINUED | OUTPATIENT
Start: 2018-07-14 | End: 2018-07-21

## 2018-07-14 RX ORDER — PROPOFOL 10 MG/ML
50 INJECTION, EMULSION INTRAVENOUS ONCE
Qty: 0 | Refills: 0 | Status: COMPLETED | OUTPATIENT
Start: 2018-07-14 | End: 2018-07-14

## 2018-07-14 RX ORDER — SODIUM CHLORIDE 9 MG/ML
500 INJECTION INTRAMUSCULAR; INTRAVENOUS; SUBCUTANEOUS ONCE
Qty: 0 | Refills: 0 | Status: COMPLETED | OUTPATIENT
Start: 2018-07-14 | End: 2018-07-14

## 2018-07-14 RX ORDER — GLUCAGON INJECTION, SOLUTION 0.5 MG/.1ML
1 INJECTION, SOLUTION SUBCUTANEOUS ONCE
Qty: 0 | Refills: 0 | Status: DISCONTINUED | OUTPATIENT
Start: 2018-07-14 | End: 2018-07-21

## 2018-07-14 RX ORDER — NIFEDIPINE 30 MG
60 TABLET, EXTENDED RELEASE 24 HR ORAL DAILY
Qty: 0 | Refills: 0 | Status: DISCONTINUED | OUTPATIENT
Start: 2018-07-14 | End: 2018-07-15

## 2018-07-14 RX ORDER — VANCOMYCIN HCL 1 G
1000 VIAL (EA) INTRAVENOUS ONCE
Qty: 0 | Refills: 0 | Status: DISCONTINUED | OUTPATIENT
Start: 2018-07-14 | End: 2018-07-14

## 2018-07-14 RX ORDER — SODIUM CHLORIDE 9 MG/ML
1000 INJECTION, SOLUTION INTRAVENOUS
Qty: 0 | Refills: 0 | Status: DISCONTINUED | OUTPATIENT
Start: 2018-07-14 | End: 2018-07-16

## 2018-07-14 RX ORDER — PIPERACILLIN AND TAZOBACTAM 4; .5 G/20ML; G/20ML
3.38 INJECTION, POWDER, LYOPHILIZED, FOR SOLUTION INTRAVENOUS EVERY 12 HOURS
Qty: 0 | Refills: 0 | Status: DISCONTINUED | OUTPATIENT
Start: 2018-07-14 | End: 2018-07-15

## 2018-07-14 RX ORDER — SODIUM CHLORIDE 9 MG/ML
1000 INJECTION INTRAMUSCULAR; INTRAVENOUS; SUBCUTANEOUS
Qty: 0 | Refills: 0 | Status: DISCONTINUED | OUTPATIENT
Start: 2018-07-14 | End: 2018-07-14

## 2018-07-14 RX ORDER — HEPARIN SODIUM 5000 [USP'U]/ML
5000 INJECTION INTRAVENOUS; SUBCUTANEOUS EVERY 8 HOURS
Qty: 0 | Refills: 0 | Status: DISCONTINUED | OUTPATIENT
Start: 2018-07-14 | End: 2018-07-20

## 2018-07-14 RX ORDER — PIPERACILLIN AND TAZOBACTAM 4; .5 G/20ML; G/20ML
3.38 INJECTION, POWDER, LYOPHILIZED, FOR SOLUTION INTRAVENOUS EVERY 8 HOURS
Qty: 0 | Refills: 0 | Status: DISCONTINUED | OUTPATIENT
Start: 2018-07-14 | End: 2018-07-14

## 2018-07-14 RX ORDER — PIPERACILLIN AND TAZOBACTAM 4; .5 G/20ML; G/20ML
3.38 INJECTION, POWDER, LYOPHILIZED, FOR SOLUTION INTRAVENOUS ONCE
Qty: 0 | Refills: 0 | Status: COMPLETED | OUTPATIENT
Start: 2018-07-14 | End: 2018-07-14

## 2018-07-14 RX ORDER — ALBUTEROL 90 UG/1
2 AEROSOL, METERED ORAL EVERY 6 HOURS
Qty: 0 | Refills: 0 | Status: DISCONTINUED | OUTPATIENT
Start: 2018-07-14 | End: 2018-07-16

## 2018-07-14 RX ORDER — IPRATROPIUM/ALBUTEROL SULFATE 18-103MCG
3 AEROSOL WITH ADAPTER (GRAM) INHALATION ONCE
Qty: 0 | Refills: 0 | Status: DISCONTINUED | OUTPATIENT
Start: 2018-07-14 | End: 2018-07-14

## 2018-07-14 RX ORDER — INSULIN LISPRO 100/ML
VIAL (ML) SUBCUTANEOUS
Qty: 0 | Refills: 0 | Status: DISCONTINUED | OUTPATIENT
Start: 2018-07-14 | End: 2018-07-15

## 2018-07-14 RX ORDER — ETOMIDATE 2 MG/ML
30 INJECTION INTRAVENOUS ONCE
Qty: 0 | Refills: 0 | Status: COMPLETED | OUTPATIENT
Start: 2018-07-14 | End: 2018-07-14

## 2018-07-14 RX ORDER — SODIUM CHLORIDE 9 MG/ML
1000 INJECTION INTRAMUSCULAR; INTRAVENOUS; SUBCUTANEOUS ONCE
Qty: 0 | Refills: 0 | Status: COMPLETED | OUTPATIENT
Start: 2018-07-14 | End: 2018-07-14

## 2018-07-14 RX ORDER — SODIUM CHLORIDE 9 MG/ML
500 INJECTION INTRAMUSCULAR; INTRAVENOUS; SUBCUTANEOUS
Qty: 0 | Refills: 0 | Status: COMPLETED | OUTPATIENT
Start: 2018-07-14 | End: 2018-07-14

## 2018-07-14 RX ORDER — DEXTROSE 50 % IN WATER 50 %
12.5 SYRINGE (ML) INTRAVENOUS ONCE
Qty: 0 | Refills: 0 | Status: DISCONTINUED | OUTPATIENT
Start: 2018-07-14 | End: 2018-07-21

## 2018-07-14 RX ORDER — SUCCINYLCHOLINE CHLORIDE 100 MG/5ML
100 SYRINGE (ML) INTRAVENOUS ONCE
Qty: 0 | Refills: 0 | Status: COMPLETED | OUTPATIENT
Start: 2018-07-14 | End: 2018-07-14

## 2018-07-14 RX ORDER — PROPOFOL 10 MG/ML
10 INJECTION, EMULSION INTRAVENOUS
Qty: 1000 | Refills: 0 | Status: DISCONTINUED | OUTPATIENT
Start: 2018-07-14 | End: 2018-07-15

## 2018-07-14 RX ORDER — FERROUS SULFATE 325(65) MG
325 TABLET ORAL DAILY
Qty: 0 | Refills: 0 | Status: DISCONTINUED | OUTPATIENT
Start: 2018-07-14 | End: 2018-07-20

## 2018-07-14 RX ORDER — ACETAMINOPHEN 500 MG
650 TABLET ORAL EVERY 6 HOURS
Qty: 0 | Refills: 0 | Status: DISCONTINUED | OUTPATIENT
Start: 2018-07-14 | End: 2018-07-21

## 2018-07-14 RX ADMIN — PIPERACILLIN AND TAZOBACTAM 200 GRAM(S): 4; .5 INJECTION, POWDER, LYOPHILIZED, FOR SOLUTION INTRAVENOUS at 09:25

## 2018-07-14 RX ADMIN — Medication 1 PUFF(S): at 21:47

## 2018-07-14 RX ADMIN — Medication 100 MILLIGRAM(S): at 09:46

## 2018-07-14 RX ADMIN — ALBUTEROL 2 PUFF(S): 90 AEROSOL, METERED ORAL at 16:37

## 2018-07-14 RX ADMIN — SODIUM CHLORIDE 2000 MILLILITER(S): 9 INJECTION INTRAMUSCULAR; INTRAVENOUS; SUBCUTANEOUS at 09:30

## 2018-07-14 RX ADMIN — SODIUM CHLORIDE 2000 MILLILITER(S): 9 INJECTION INTRAMUSCULAR; INTRAVENOUS; SUBCUTANEOUS at 09:45

## 2018-07-14 RX ADMIN — Medication 1 PUFF(S): at 16:37

## 2018-07-14 RX ADMIN — Medication 40 MILLIGRAM(S): at 18:28

## 2018-07-14 RX ADMIN — SODIUM CHLORIDE 2000 MILLILITER(S): 9 INJECTION INTRAMUSCULAR; INTRAVENOUS; SUBCUTANEOUS at 13:44

## 2018-07-14 RX ADMIN — SODIUM CHLORIDE 2000 MILLILITER(S): 9 INJECTION INTRAMUSCULAR; INTRAVENOUS; SUBCUTANEOUS at 09:15

## 2018-07-14 RX ADMIN — SODIUM CHLORIDE 150 MILLILITER(S): 9 INJECTION INTRAMUSCULAR; INTRAVENOUS; SUBCUTANEOUS at 23:51

## 2018-07-14 RX ADMIN — Medication 325 MILLIGRAM(S): at 18:28

## 2018-07-14 RX ADMIN — SODIUM CHLORIDE 2000 MILLILITER(S): 9 INJECTION INTRAMUSCULAR; INTRAVENOUS; SUBCUTANEOUS at 10:15

## 2018-07-14 RX ADMIN — ALBUTEROL 2 PUFF(S): 90 AEROSOL, METERED ORAL at 21:47

## 2018-07-14 RX ADMIN — CHLORHEXIDINE GLUCONATE 15 MILLILITER(S): 213 SOLUTION TOPICAL at 18:28

## 2018-07-14 RX ADMIN — Medication 1: at 18:28

## 2018-07-14 RX ADMIN — HEPARIN SODIUM 5000 UNIT(S): 5000 INJECTION INTRAVENOUS; SUBCUTANEOUS at 13:44

## 2018-07-14 RX ADMIN — SODIUM CHLORIDE 3 MILLILITER(S): 9 INJECTION INTRAMUSCULAR; INTRAVENOUS; SUBCUTANEOUS at 09:20

## 2018-07-14 RX ADMIN — Medication 125 MILLIGRAM(S): at 09:25

## 2018-07-14 RX ADMIN — Medication 40 MILLIGRAM(S): at 23:55

## 2018-07-14 RX ADMIN — SODIUM CHLORIDE 1000 MILLILITER(S): 9 INJECTION INTRAMUSCULAR; INTRAVENOUS; SUBCUTANEOUS at 23:20

## 2018-07-14 RX ADMIN — SODIUM CHLORIDE 100 MILLILITER(S): 9 INJECTION INTRAMUSCULAR; INTRAVENOUS; SUBCUTANEOUS at 12:35

## 2018-07-14 RX ADMIN — PIPERACILLIN AND TAZOBACTAM 25 GRAM(S): 4; .5 INJECTION, POWDER, LYOPHILIZED, FOR SOLUTION INTRAVENOUS at 20:53

## 2018-07-14 RX ADMIN — HEPARIN SODIUM 5000 UNIT(S): 5000 INJECTION INTRAVENOUS; SUBCUTANEOUS at 21:24

## 2018-07-14 RX ADMIN — Medication 60 MILLIGRAM(S): at 21:17

## 2018-07-14 RX ADMIN — PANTOPRAZOLE SODIUM 40 MILLIGRAM(S): 20 TABLET, DELAYED RELEASE ORAL at 12:33

## 2018-07-14 RX ADMIN — PROPOFOL 3 MICROGRAM(S)/KG/MIN: 10 INJECTION, EMULSION INTRAVENOUS at 11:15

## 2018-07-14 RX ADMIN — Medication 250 MILLIGRAM(S): at 12:35

## 2018-07-14 RX ADMIN — SODIUM CHLORIDE 150 MILLILITER(S): 9 INJECTION INTRAMUSCULAR; INTRAVENOUS; SUBCUTANEOUS at 13:54

## 2018-07-14 RX ADMIN — ETOMIDATE 30 MILLIGRAM(S): 2 INJECTION INTRAVENOUS at 09:45

## 2018-07-14 RX ADMIN — SODIUM CHLORIDE 2000 MILLILITER(S): 9 INJECTION INTRAMUSCULAR; INTRAVENOUS; SUBCUTANEOUS at 10:00

## 2018-07-14 NOTE — H&P ADULT - PROBLEM SELECTOR PLAN 8
IVF received 3 litres and c/w 150cc hour as pt cr elevated   trend CR and monitor for fluid overload   avoid nephrotoxic meds

## 2018-07-14 NOTE — H&P ADULT - PROBLEM SELECTOR PLAN 1
Vented, f/u abg   c/w sedation, restraints as nessary as pt seen try to pull tube when stimulated   wean vent as tolerated   GII PPX: Protonix daily   DVT PPX: hep sq q8  chest pt   AM CXR  Nebs  Solumedrol 40 q6

## 2018-07-14 NOTE — ED PROCEDURE NOTE - CPROC ED TRACHE INTUB DETAIL1
During intubation, applied gentle pressure to the cricoid cartilage./Patient was pre-oxygenated. An endotracheal tube (ETT) was placed through the vocal cords into the trachea.  ETT position was confirmed by auscultation of bilateral breath sounds to all lung fields. ETCO2 level was appropriate. Patient was pre-oxygenated. An endotracheal tube (ETT) was placed through the vocal cords into the trachea.  ETT position was confirmed by auscultation of bilateral breath sounds to all lung fields. ETCO2 level was appropriate./Patient connected to ventilator with settings as ordered./During intubation, applied gentle pressure to the cricoid cartilage.

## 2018-07-14 NOTE — H&P ADULT - NSHPPHYSICALEXAM_GEN_ALL_CORE
Physical Examination:  GENERAL:               sedated, responsive to noxious stimuli, vented in nad   HEENT:                    Pupils equal, reactive to light.  Symmetric. No JVD, Moist MM  PULM:                     Bilateral air entry, course bs b/l at bases, no significant sputum production  CVS:                         S1, S2,  + Murmur, hemodynamically stable   ABD:                        Soft, nondistended, nontender, normoactive bowel sounds,   EXT:                         No edema, nontender, No Cyanosis or Clubbing   Vascular:                Warm Extremities, Normal Distal Pulses  SKIN:                       Warm and well perfused, no rashes noted.   NEURO:                  gcs 6T  PSYC:                      sedated

## 2018-07-14 NOTE — H&P ADULT - ATTENDING COMMENTS
Patient seen and examined in ED  responded to overhead page respiratory to ED.  Patient seen at time lethargic minimally responsive on exam diffuse hypoventilation and agonal breathing on NIV.  decision made to intubate with ED MD.  Patient subsequently intubated and brought to ICU  CTA shows b/l PNA and Large AAA.  Dr. Galeano called and will see pt in am.  Son also called and came bedside and was updated.    Assessment  Acute hyoxic and hypercarbic resp failure due to b/l pna   acute on chronic copd exacerbation  severe sepsis due to above  DOC on CKD baseline cr 1.4  AAA enlarging       Plan  Admit to ICU  Mechanical ventilation  broad spectrum abx  IVF  renal eval  Vascular eval  pan culture  check sputum culture  steroids and bronchodilators  GI DVT ppx  Insulin SS  son updated full code  gonzález strict I&O

## 2018-07-14 NOTE — H&P ADULT - ASSESSMENT
83 female  HTN, COPD/asthma on home o2 on chronic Prednisone, TAA, p/w MS change and severe resp distress s/p intubation found with b/l PNA, sami/ckd, enlarging TAA, with severe sepsis initially

## 2018-07-14 NOTE — ED ADULT NURSE REASSESSMENT NOTE - NS ED NURSE REASSESS COMMENT FT1
Patient intubated by MD Root 7.0 at 22cm lip line placed on mechanical ventilation post intubation, PO 97% after intubation, tolerated procedure well, improvement noted.  Co2 detector with positive color change post placement

## 2018-07-14 NOTE — ED PROVIDER NOTE - CRITICAL CARE PROVIDED
additional history taking/consultation with other physicians/direct patient care (not related to procedure)/documentation/interpretation of diagnostic studies/consult w/ pt's family directly relating to pts condition

## 2018-07-14 NOTE — ED ADULT NURSE NOTE - CHPI ED SYMPTOMS NEG
no body aches/no diaphoresis/no chest pain/no cough/no wheezing/no headache/no hemoptysis/no chills/no edema/no fever

## 2018-07-14 NOTE — ED PROVIDER NOTE - OBJECTIVE STATEMENT
83F h/o COPD not on home O2, recently admitted for COPD exacerbation, h/o AAA not repaired, full code, BIBEMS from NH for depressed mental status and diff breathing. Pt was hypoxic to 70% on RA, placed on CPAP by EMS with improvement of oxygenation but not mental status. Upon arrival pt on CPAP, eyes open but not answering any qs which is a change from baseline - pt is usually ambulatory and verbal. Attempted to call emergency contact - son - but unable to reach anyone. Dr. Simmons at bedside, given pt's CNS depression and respiratory distress, no advanced directives on file and unable to reach family, decision made in conjunction with Dr. Simmons to intubate pt. Also, CTA recommended by Dr. Simmons given concern for PE. ICU to f/u results.

## 2018-07-14 NOTE — H&P ADULT - NSHPLABSRESULTS_GEN_ALL_CORE
LABS:                        10.8   34.2  )-----------( 197      ( 2018 09:14 )             35.9         142  |  105  |  41<H>  ----------------------------<  177<H>  4.8   |  25  |  2.42<H>    Ca    9.0      2018 09:14    TPro  7.9  /  Alb  3.2<L>  /  TBili  0.4  /  DBili  x   /  AST  35  /  ALT  26  /  AlkPhos  92  14            PT/INR - ( 2018 09:14 )   PT: 12.9 sec;   INR: 1.16 ratio         PTT - ( 2018 09:14 )  PTT:31.0 sec  Urinalysis Basic - ( 2018 12:07 )    Color: Yellow / Appearance: Clear / S.010 / pH: x  Gluc: x / Ketone: Negative  / Bili: Negative / Urobili: Negative   Blood: x / Protein: 100 / Nitrite: Negative   Leuk Esterase: Negative / RBC: 5-10 /HPF / WBC 0-2 /HPF   Sq Epi: x / Non Sq Epi: Neg.-Few / Bacteria: Few /HPF              CULTURES: (if applicable)        ABG - ( 2018 09:20 )  pH, Arterial: 7.30  pH, Blood: x     /  pCO2: 47    /  pO2: 64    / HCO3: x     / Base Excess: x     /  SaO2: 88                CAPILLARY BLOOD GLUCOSE          RADIOLOGY  CXR:      CT:    ECHO:      VITALS:  T(C): 36.9 (18 @ 12:00), Max: 36.9 (18 @ 12:00)  T(F): 98.4 (18 @ 12:00), Max: 98.4 (18 @ 12:00)  HR: 95 (18 @ 15:00) (95 - 139)  BP: 113/86 (18 @ 15:00) (71/52 - 210/133)  BP(mean): 96 (18 @ 15:00) (77 - 108)  ABP: --  ABP(mean): --  RR: 20 (18 @ 15:00) (12 - 38)  SpO2: 100% (18 @ 15:00) (92% - 100%)  CVP(mm Hg): --  CVP(cm H2O): --    Ins and Outs     18 @ 07:01  -  18 @ 16:00  --------------------------------------------------------  IN: 4055 mL / OUT: 290 mL / NET: 3765 mL        Height (cm): 160.02 (18 @ 10:30)  Weight (kg): 57.4 (18 @ 10:30)  BMI (kg/m2): 22.4 (18 @ 10:30)    Device: 840, Mode: AC/ CMV (Assist Control/ Continuous Mandatory Ventilation), RR (machine): 20, RR (patient): 20, TV (machine): 450, TV (patient): 450, FiO2: 50, PEEP: 5, PIP: 44

## 2018-07-14 NOTE — CONSULT NOTE ADULT - ASSESSMENT
·	DOC, CKD 3: Prerenal azotemia, ATN. Risk of contrast nephropathy  ·	Respiratory failure, Pneumonia  ·	Aortic aneurysm  ·	Diabetes  ·	Hypertension    Continue IV hydration. Check repeat labs and monitor renal function trend. Encourage PO intake as tolerated.   Labs as ordered. Get renal sonogram. Avoid nephrotoxic meds as possible. Avoid ACEI, ARB and NSAIDS.   Monitor input and output. Vascular surgery evaluation. Monitor blood sugar levels. Insulin coverage as needed.   Monitor BP trend. Will follow electrolytes and renal function trend. Further recommendations pending clinical course.   Thank you for the courtesy of this referral. ·	DOC, CKD 3: Prerenal azotemia, ATN. Risk of contrast nephropathy, ? Left atrophic kidney (on CT scan)  ·	Respiratory failure, Pneumonia  ·	Aortic aneurysm  ·	Diabetes  ·	Hypertension    Continue IV hydration. Check repeat labs and monitor renal function trend. Encourage PO intake as tolerated.   Check cultures, IV abx. Pulmonary and ID evaluation. Labs as ordered. No hydro on CT scan. ? Left renal atrophy. Will get renal sonogram.    Avoid nephrotoxic meds as possible. Avoid ACEI, ARB and NSAIDS.   Monitor input and output. Vascular surgery evaluation. Monitor blood sugar levels. Insulin coverage as needed.   Monitor BP trend. Will follow electrolytes and renal function trend. Further recommendations pending clinical course.   Thank you for the courtesy of this referral.

## 2018-07-14 NOTE — AIRWAY PLACEMENT NOTE ADULT - POST AIRWAY PLACEMENT ASSESSMENT:
CXR pending/breath sounds bilateral/breath sounds equal
CXR pending/breath sounds bilateral/positive end tidal CO2 noted

## 2018-07-14 NOTE — PROGRESS NOTE ADULT - ASSESSMENT
84 yo female pmhx COPD (not on home O2) on chronic prednisone, HTN, h/o TAA, and depression biba from NH for AMS and respiratory distress.  Upon arrival to ED patient on CPAP, lethargic, opening eyes but not answering questions.  Patient intubated for acute respiratory failure.    NEURO: Patient sedated with propofol. Wean for RASS of -1 to 0.    CV: Severe sepsis likely due to PNA.  Adequately fluid resuscitated.  Cultures pending.  On broad spectrum abx.  TAA 6mm, vascular following.  HTN, home medications as tolerated.    RESP: Acute Respiratory Failure: intubated AC/VC, Lung protective strategies 6cc/kg tidal volume, FiO2 of 50% with sPO2 of 100%, FiO2 weaned to 30% now with sPO2 100%.  Keep HOB >30 degrees.  Peridex for VAP ppx.  Heparin for chemical VTE ppx.  Pantoprazole for stress ulcer ppx.  HCAP on zosyn and vancomycin.  COPD continue nebs and steroids.    RENAL: DOC on CKD.  Patient's urine output trended down to 10-15cc/hour.  Fluid bolus given.  Goal to keep urine output >30cc/hour.  Avoid nephrotoxic medications, renally dose medications, trend urine output, BUN/Cr, electrolytes.  Replace electrolytes as needed.    GI: NPO on ISS.    ENDO: No active issues.   ID: Vancomycin and Zosyn, see CV plan.   DISPO: Full code.     Critical Care time: 60 minutes assessing presenting problems of acute illness that poses high probability of life threatening deterioration or end organ damage/dysfunction.  Medical decision making including Initiating plan of care, reviewing data, reviewing radiology, discussing with multidisciplinary team, non inclusive of procedures, discussing goals of care with patient/family.

## 2018-07-14 NOTE — CONSULT NOTE ADULT - SUBJECTIVE AND OBJECTIVE BOX
Patient is a 83y old  Female who presents with a chief complaint of Acute respiratory failure (2018 15:28)    HPI:  83F hx of dementia, HTN, COPD/asthma on home o2 on chronic Prednisone, TAA pw from [facility] presents via ambulance from NH for depressed mental status and diff breathing. Pt was hypoxic to 70% on RA, placed on CPAP by EMS with improvement of oxygenation but not mental status. Upon arrival pt on CPAP, eyes open but not answering any ? which is a change from baseline - pt is usually ambulatory and verbal.. Emergently switched to bipap with no improvment. On arrival for evaluation pt poorly saturating, Bipap settings where managed to provide appropriate oxygenation while intubation was being prepared. Abg had been drawn noting she was in hypercapnic/hypoxemix resp failure. Intubation was successful, sepsis protocol initated in ED, pt was taken to CT for CTA of chest concerned for PE and AAA. Pt noted to have b/l infilitrates. unable to assess further history 2/2 to pt mental status. (2018 15:28)    Renal consult called for abnormal renal function. Pt seen in ICU. On vent support. Recived almost 3 Liters IVF bolus.   UO poor. NGT on suction.       PAST MEDICAL HISTORY:  Thoracic aortic aneurysm without rupture  Hypertension  Abdominal aneurysm  Asthma  Depression  COPD (chronic obstructive pulmonary disease)      PAST SURGICAL HISTORY:  No significant past surgical history      FAMILY HISTORY:      SOCIAL HISTORY: unable to obtain    Allergies    No Known Allergies    Intolerances      Home Medications:  acetaminophen 325 mg oral tablet: 2 tab(s) orally every 6 hours, As needed, For Temp greater than 38 C (100.4 F) (2018 10:53)  budesonide 0.5 mg/2 mL inhalation suspension: 2 milliliter(s) inhaled 2 times a day (2018 19:44)  ferrous sulfate 325 mg (65 mg elemental iron) oral tablet: orally once a day (2018 19:44)  gabapentin 300 mg oral tablet: 600 milligram(s) orally once a day (at bedtime) (2018 19:47)  mirtazapine 15 mg oral tablet, disintegratin.5 tab(s) orally once a day (at bedtime) (2018 19:48)  NIFEdipine 60 mg oral tablet, extended release: 1 tab(s) orally once a day (2018 19:44)  QUEtiapine 25 mg oral tablet: orally 2 times a day (2018 19:44)  Senna 8.6 mg oral tablet: orally 2 times a day (2018 19:44)  Slow-Mag 119 mg-71.5 mg oral delayed release tablet: 1 tab(s) orally once a day (2018 19:49)  Vitamin C 500 mg oral capsule: 1 cap(s) orally once a day (2018 19:50)    MEDICATIONS  (STANDING):  ALBUTerol    90 MICROgram(s) HFA Inhaler 2 Puff(s) Inhalation every 6 hours  chlorhexidine 0.12% Liquid 15 milliLiter(s) Swish and Spit two times a day  dextrose 5%. 1000 milliLiter(s) (50 mL/Hr) IV Continuous <Continuous>  dextrose 50% Injectable 12.5 Gram(s) IV Push once  dextrose 50% Injectable 25 Gram(s) IV Push once  dextrose 50% Injectable 25 Gram(s) IV Push once  ferrous sulfate Oral Tab/Cap - Peds 325 milliGRAM(s) Oral daily  heparin  Injectable 5000 Unit(s) SubCutaneous every 8 hours  insulin lispro (HumaLOG) corrective regimen sliding scale   SubCutaneous three times a day before meals  ipratropium 17 MICROgram(s) HFA Inhaler 1 Puff(s) Inhalation every 6 hours  methylPREDNISolone sodium succinate Injectable 40 milliGRAM(s) IV Push every 6 hours  NIFEdipine XL 60 milliGRAM(s) Oral daily  pantoprazole  Injectable 40 milliGRAM(s) IV Push daily  piperacillin/tazobactam IVPB. 3.375 Gram(s) IV Intermittent every 12 hours  propofol Infusion 10 MICROgram(s)/kG/Min (3 mL/Hr) IV Continuous <Continuous>  sodium chloride 0.9%. 1000 milliLiter(s) (150 mL/Hr) IV Continuous <Continuous>  vancomycin  IVPB 750 milliGRAM(s) IV Intermittent daily    MEDICATIONS  (PRN):  acetaminophen   Tablet 650 milliGRAM(s) Oral every 6 hours PRN For Temp greater than 38.5 C (101.3 F)  dextrose 40% Gel 15 Gram(s) Oral once PRN Blood Glucose LESS THAN 70 milliGRAM(s)/deciliter  glucagon  Injectable 1 milliGRAM(s) IntraMuscular once PRN Glucose LESS THAN 70 milligrams/deciliter      REVIEW OF SYSTEMS:  Unable to obtain. On vent     T(F): 98.4 (18 @ 12:00), Max: 98.4 (18 @ 12:00)  HR: 75 (18 @ 16:38) (75 - 139)  BP: 113/86 (18 @ 15:00) (71/52 - 210/133)  RR: 20 (18 @ 15:00) (12 - 38)  SpO2: 98% (18 @ 16:38) (92% - 100%)  Wt(kg): --    PHYSICAL EXAM:  General: on vent   Respiratory: b/l air entry  Cardiovascular: S1 S2  Gastrointestinal: soft  Extremities: no edema    Mode: AC/ CMV (Assist Control/ Continuous Mandatory Ventilation)  RR (machine): 20  TV (machine): 450  FiO2: 50  PEEP: 5  PIP: 44          142  |  105  |  41<H>  ----------------------------<  177<H>  4.8   |  25  |  2.42<H>    Ca    9.0      2018 09:14    TPro  7.9  /  Alb  3.2<L>  /  TBili  0.4  /  DBili  x   /  AST  35  /  ALT  26  /  AlkPhos  92                            10.8   34.2  )-----------( 197      ( 2018 09:14 )             35.9       Potassium, Serum: 4.8 mmol/L ( @ 09:14)  Blood Urea Nitrogen, Serum: 41 mg/dL ( @ 09:14)  Calcium, Total Serum: 9.0 mg/dL ( @ 09:14)  Hemoglobin: 10.8 g/dL ( @ 09:14)      Creatinine, Serum: 2.42 ( @ 09:14)    Creatinine, Serum: 1.66 mg/dL (18 @ 08:35)    Urinalysis Basic - ( 2018 12:07 )    Color: Yellow / Appearance: Clear / S.010 / pH: x  Gluc: x / Ketone: Negative  / Bili: Negative / Urobili: Negative   Blood: x / Protein: 100 / Nitrite: Negative   Leuk Esterase: Negative / RBC: 5-10 /HPF / WBC 0-2 /HPF   Sq Epi: x / Non Sq Epi: Neg.-Few / Bacteria: Few /HPF      LIVER FUNCTIONS - ( 2018 09:14 )  Alb: 3.2 g/dL / Pro: 7.9 g/dL / ALK PHOS: 92 U/L / ALT: 26 U/L DA / AST: 35 U/L / GGT: x                     ABG - ( 2018 09:20 )  pH, Arterial: 7.30  pH, Blood: x     /  pCO2: 47    /  pO2: 64    / HCO3: x     / Base Excess: x     /  SaO2: 88                I&O's Detail    2018 07:01  -  2018 17:28  --------------------------------------------------------  IN:    0.9% NaCl: 2000 mL    propofol Infusion: 5 mL    sodium chloride 0.9%: 200 mL    sodium chloride 0.9%.: 300 mL    Solution: 250 mL    Solution: 1300 mL  Total IN: 4055 mL    OUT:    Indwelling Catheter - Urethral: 290 mL  Total OUT: 290 mL    Total NET: 3765 mL    < from: CT Angio Abdomen and Pelvis w/ IV Cont (18 @ 10:22) >    EXAM:  CT ANGIO ABD PELV (W)AW IC    EXAM:  CT ANGIO CHEST (W)AW IC      PROCEDURE DATE:  2018        INTERPRETATION:  CLINICAL HISTORY:  Dk in onset of hypoxia; evaluate for   pulmonary embolism. History of aneurysm; assess for aortic rupture.    Multiple axial images of the chest, abdomen and pelvis were obtained from   the lung apices through symphysis pubis with the administration of   intravascular contrast. 100cc of Omnipaque 350 was administered   intravenously without complication. Reformatted coronal and sagittal   images are submitted. MIP images were created utilizing dedicated   computer software.    COMPARISON: CT evaluation of the chest 2018; CT evaluation   abdomen/pelvis 2014.    FINDINGS: No fillingdefects are identified within the segmental   pulmonary arterial tree to indicate pulmonary embolism.    There is evidence for increased caliber of the distal aortic arch   measuring 3.8 cm. A region of atherosclerotic ulceration is identified   along the medial/inferior aspect of the aortic arch (no prior   contrast-enhanced CT evaluation of the chest is available for   comparison). The caliber of the descending thoracic aorta measures up to   3.8 cm, demonstrating prominent mural plaque, unchanged in size. The   caliber of the aorta at the level of the diaphragmatic hiatus is   unchanged measuring 3.7 cm. Aneurysmal dilatation of the abdominal aorta   is identified, significantly increased in size measuring up to 6.0 cm   with circumferential mural thrombus identified. There is no evidence for   aortic rupture or aortic dissection.    Note is made of an indwelling ETT and NGT.    No abnormally enlarged mediastinal or hilar lymph nodes are noted. There   is no evidence for axillary lymphadenopathy. The heart size appears   within normal limits. No pericardial effusion is identified.  The central bronchial anatomy appears patent.    Emphysematous changes are identified bilaterally. Interstitial thickening   is identified within the right upper lobe. There are regions of irregular   nodular opacity and increased interstitial opacity within the right lower   lobe lung parenchyma. Regions of interstitial and airspace opacities are   identified throughout the left upper lobe and left lower lobe with dense   left lower lobe consolidation evident. There is no evidence for pleural   effusion or pneumothorax. No mediastinal shift is noted.    The liver is normal in size. No focal hepatic masses are identified.   There is no evidence for intrahepatic or extrahepatic biliary dilatation.   No gallstones, gallbladder wall thickening or pericholecystic fluid   identified.    The spleen, pancreas and adrenal glands are unremarkable.    Left renal parenchymal atrophy is identified. Thereis no evidence for   bilateral hydronephrosis. A 1.9 cm hypodense focus is identified within   the lower pole aspect of the right kidney, which cannot be characterized   as a simple cyst on this evaluation. No abnormally enlarged   retroperitoneal orpelvic lymphadenopathy is noted.    Fecal material is scattered throughout the colon. There is no evidence   for mechanical bowel obstruction. No colonic wall thickening is   identified. There is no evidence for free intraperitoneal air or fluid.   The appendix is not visualized. The uterus and urinary bladder appear   unremarkable.     Degenerative changes of the thoracic and lumbar spine as well as right   hip region identified.    IMPRESSION:    1. No filling defects identified within the segmental pulmonary arterial   tree to indicate pulmonary was them.  2. There is evidence for increased caliber of the distal aortic arch   measuring 3.8 cm. A region of atherosclerotic ulceration is identified   along the medial/inferior aspect of the aortic arch (no prior   contrast-enhanced CT evaluation of the chest is available for   comparison). The caliber of the descending thoracic aorta measures up to   3.8 cm, demonstrating prominent mural plaque, unchanged in size. The   caliber of the aorta at the level of the diaphragmatic hiatus is   unchanged measuring 3.7 cm. Aneurysmal dilatation of the abdominal aorta   is identified, significantly increased in size measuring up to 6.0 cm   with circumferential mural thrombus identified. There isno evidence for   aortic rupture or aortic dissection.  3. There are regions of irregular nodular opacity and increased   interstitial opacity within the right lower lobe lung parenchyma. Regions   of interstitial and airspace opacities are identified throughout the left   upper lobe and left lower lobe with dense left lower lobe consolidation   evident.       RASHMI ALEXANDRA   This document has been electronically signed. 2018 11:05AM                < end of copied text >

## 2018-07-14 NOTE — PROGRESS NOTE ADULT - SUBJECTIVE AND OBJECTIVE BOX
Patient is a 83y old  Female who presents with a chief complaint of Acute respiratory failure (2018 15:28)      BRIEF HOSPITAL COURSE:   Patient is an 84 yo female pmhx COPD (not on home O2) on chronic prednisone, HTN, h/o TAA, and depression biba from NH for AMS and respiratory distress.  Upon arrival to ED patient on CPAP, lethargic, opening eyes but not answering questions.  Patient intubated for acute respiratory failure.  CTA obtained due to concern for PE; scan negative for PE, revealed 6mm aneurysm.  Patient transferred to ICU for further management.       PAST MEDICAL & SURGICAL HISTORY:  Thoracic aortic aneurysm without rupture  Hypertension  Asthma  Depression  COPD (chronic obstructive pulmonary disease)  No significant past surgical history    Allergies  No Known Allergies      FAMILY HISTORY:  Noncontributory     Social History:   Lives in [facility].  Former smoker.      Review of Systems:  Unable to obtain due to patient intubated.        Vitals During Exam:   HR: 71  BP: 82/61 mmHg  RR: 20  sPO2: 99%    Physical Examination:    General: Patient lying in bed, appears comfortable.      HEENT: Pupils equal, reactive to light.  Symmetric.    PULM: Clear to auscultation bilaterally, no significant sputum production    CVS: Regular rate and rhythm, no murmurs, rubs, or gallops    ABD: Soft, nondistended, nontender, normoactive bowel sounds, no masses    EXT: No edema, nontender    SKIN: Warm and well perfused, no rashes noted.    NEURO: Alert, oriented, interactive, nonfocal      Medications:  piperacillin/tazobactam IVPB. 3.375 Gram(s) IV Intermittent every 12 hours  vancomycin  IVPB 750 milliGRAM(s) IV Intermittent daily  NIFEdipine XL 60 milliGRAM(s) Oral daily  ALBUTerol    90 MICROgram(s) HFA Inhaler 2 Puff(s) Inhalation every 6 hours  ipratropium 17 MICROgram(s) HFA Inhaler 1 Puff(s) Inhalation every 6 hours  acetaminophen   Tablet 650 milliGRAM(s) Oral every 6 hours PRN  propofol Infusion 10 MICROgram(s)/kG/Min IV Continuous <Continuous>  heparin  Injectable 5000 Unit(s) SubCutaneous every 8 hours  pantoprazole  Injectable 40 milliGRAM(s) IV Push daily      dextrose 40% Gel 15 Gram(s) Oral once PRN  dextrose 50% Injectable 12.5 Gram(s) IV Push once  dextrose 50% Injectable 25 Gram(s) IV Push once  dextrose 50% Injectable 25 Gram(s) IV Push once  glucagon  Injectable 1 milliGRAM(s) IntraMuscular once PRN  insulin lispro (HumaLOG) corrective regimen sliding scale   SubCutaneous three times a day before meals  methylPREDNISolone sodium succinate Injectable 40 milliGRAM(s) IV Push every 6 hours  dextrose 5%. 1000 milliLiter(s) IV Continuous <Continuous>  ferrous sulfate Oral Tab/Cap - Peds 325 milliGRAM(s) Oral daily  sodium chloride 0.9% Bolus 500 milliLiter(s) IV Bolus once  sodium chloride 0.9%. 1000 milliLiter(s) IV Continuous <Continuous>  chlorhexidine 0.12% Liquid 15 milliLiter(s) Swish and Spit two times a day      Mode: AC/ CMV (Assist Control/ Continuous Mandatory Ventilation)  RR (machine): 20  TV (machine): 450  FiO2: 50  PEEP: 5  MAP: 11  PIP: 26      ICU Vital Signs Last 24 Hrs  T(C): 36.4 (2018 21:00), Max: 37.2 (2018 16:00)  T(F): 97.6 (2018 21:00), Max: 98.9 (2018 16:00)  HR: 71 (2018 22:00) (68 - 139)  BP: 82/61 (2018 22:00) (71/52 - 210/133)  BP(mean): 69 (2018 22:00) (69 - 108)  ABP: --  ABP(mean): --  RR: 20 (2018 22:00) (12 - 38)  SpO2: 99% (:00) (92% - 100%)    Vital Signs Last 24 Hrs  T(C): 36.4 (2018 21:00), Max: 37.2 (2018 16:00)  T(F): 97.6 (2018 21:00), Max: 98.9 (2018 16:00)  HR: 71 (2018 22:00) (68 - 139)  BP: 82/61 (2018 22:00) (71/52 - 210/133)  BP(mean): 69 (2018 22:00) (69 - 108)  RR: 20 (2018 22:00) (12 - 38)  SpO2: 99% (2018 22:00) (92% - 100%)    ABG - ( 2018 17:30 )  pH, Arterial: 7.33  pH, Blood: x     /  pCO2: 39    /  pO2: 89    / HCO3: x     / Base Excess: x     /  SaO2: 96          I&O's Detail    2018 07:01  -  2018 23:18  --------------------------------------------------------  IN:    0.9% NaCl: 2000 mL    propofol Infusion: 18.9 mL    sodium chloride 0.9%: 200 mL    sodium chloride 0.9%.: 1350 mL    Solution: 50 mL    Solution: 250 mL    Solution: 1300 mL  Total IN: 5168.9 mL    OUT:    Indwelling Catheter - Urethral: 410 mL  Total OUT: 410 mL  Total NET: 4758.9 mL      LABS:                        9.7    25.1  )-----------( 154      ( 2018 18:35 )             31.7     07-14    142  |  112<H>  |  36<H>  ----------------------------<  164<H>  5.1   |  20<L>  |  2.12<H>    Ca    7.2<L>      2018 18:35    TPro  7.9  /  Alb  3.2<L>  /  TBili  0.4  /  DBili  x   /  AST  35  /  ALT  26  /  AlkPhos  92  07-14          CAPILLARY BLOOD GLUCOSE  POCT Blood Glucose.: 180 mg/dL (2018 18:14)      PT/INR - ( 2018 09:14 )   PT: 12.9 sec;   INR: 1.16 ratio         PTT - ( 2018 09:14 )  PTT:31.0 sec  Urinalysis Basic - ( 2018 12:07 )    Color: Yellow / Appearance: Clear / S.010 / pH: x  Gluc: x / Ketone: Negative  / Bili: Negative / Urobili: Negative   Blood: x / Protein: 100 / Nitrite: Negative   Leuk Esterase: Negative / RBC: 5-10 /HPF / WBC 0-2 /HPF   Sq Epi: x / Non Sq Epi: Neg.-Few / Bacteria: Few /HPF      CULTURES:        RADIOLOGY: ***      SUPPLEMENTAL O2:   LINES:  KATHARINA:   [place]:   CONTACT: Patient is a 83y old  Female who presents with a chief complaint of Acute respiratory failure (2018 15:28)      BRIEF HOSPITAL COURSE:   Patient is an 82 yo female pmhx COPD (not on home O2) on chronic prednisone, HTN, h/o TAA, and depression biba from NH for AMS and respiratory distress.  Upon arrival to ED patient on CPAP, lethargic, opening eyes but not answering questions.  Patient intubated for acute respiratory failure.  CTA obtained due to concern for PE; scan negative for PE, revealed 6mm aneurysm.  Patient transferred to ICU for further management.       PAST MEDICAL & SURGICAL HISTORY:  Thoracic aortic aneurysm without rupture  Hypertension  Asthma  Depression  COPD (chronic obstructive pulmonary disease)  No significant past surgical history    Allergies  No Known Allergies      FAMILY HISTORY:  Noncontributory     Social History:   Lives in [facility].  Former smoker.      Review of Systems:  Unable to obtain due to patient intubated.        Vitals During Exam:   HR: 71  BP: 82/61 mmHg  RR: 20  sPO2: 99%    Physical Examination:    General: Patient lying in bed, appears comfortable.      HEENT: NC/AT, Pupils 2mm, equal, reactive to light.  Symmetric.    PULM: Symmetrical thorax expansion upon respiration.  Clear to auscultation bilaterally, no significant sputum production appreciated.  ET tube in place.     CVS: Regular rate and rhythm, +s1, +s2, no murmurs, rubs, or gallops appreciated.     ABD: Soft, nondistended, nontender, normoactive bowel sounds, no masses appreciated.     EXT: No edema, nontender, DP pulses 2+ symmetrical    SKIN: Warm and well perfused, no rashes noted.    NEURO: Sedated on propofol.       Medications:  piperacillin/tazobactam IVPB. 3.375 Gram(s) IV Intermittent every 12 hours  vancomycin  IVPB 750 milliGRAM(s) IV Intermittent daily  NIFEdipine XL 60 milliGRAM(s) Oral daily  ALBUTerol    90 MICROgram(s) HFA Inhaler 2 Puff(s) Inhalation every 6 hours  ipratropium 17 MICROgram(s) HFA Inhaler 1 Puff(s) Inhalation every 6 hours  acetaminophen   Tablet 650 milliGRAM(s) Oral every 6 hours PRN  propofol Infusion 10 MICROgram(s)/kG/Min IV Continuous <Continuous>  heparin  Injectable 5000 Unit(s) SubCutaneous every 8 hours  pantoprazole  Injectable 40 milliGRAM(s) IV Push daily      dextrose 40% Gel 15 Gram(s) Oral once PRN  dextrose 50% Injectable 12.5 Gram(s) IV Push once  dextrose 50% Injectable 25 Gram(s) IV Push once  dextrose 50% Injectable 25 Gram(s) IV Push once  glucagon  Injectable 1 milliGRAM(s) IntraMuscular once PRN  insulin lispro (HumaLOG) corrective regimen sliding scale   SubCutaneous three times a day before meals  methylPREDNISolone sodium succinate Injectable 40 milliGRAM(s) IV Push every 6 hours  dextrose 5%. 1000 milliLiter(s) IV Continuous <Continuous>  ferrous sulfate Oral Tab/Cap - Peds 325 milliGRAM(s) Oral daily  sodium chloride 0.9% Bolus 500 milliLiter(s) IV Bolus once  sodium chloride 0.9%. 1000 milliLiter(s) IV Continuous <Continuous>  chlorhexidine 0.12% Liquid 15 milliLiter(s) Swish and Spit two times a day      Mode: AC/ CMV (Assist Control/ Continuous Mandatory Ventilation)  RR (machine): 20  TV (machine): 450  FiO2: 50  PEEP: 5  MAP: 11  PIP: 26      ICU Vital Signs Last 24 Hrs  T(C): 36.4 (2018 21:00), Max: 37.2 (2018 16:00)  T(F): 97.6 (2018 21:00), Max: 98.9 (2018 16:00)  HR: 71 (:00) (68 - 139)  BP: 82/61 (2018 22:00) (71/52 - 210/133)  BP(mean): 69 (2018 22:00) (69 - 108)  ABP: --  ABP(mean): --  RR: 20 (:00) (12 - 38)  SpO2: 99% (:00) (92% - 100%)    Vital Signs Last 24 Hrs  T(C): 36.4 (2018 21:00), Max: 37.2 (2018 16:00)  T(F): 97.6 (2018 21:00), Max: 98.9 (2018 16:00)  HR: 71 (2018 22:00) (68 - 139)  BP: 82/61 (2018 22:00) (71/52 - 210/133)  BP(mean): 69 (2018 22:00) (69 - 108)  RR: 20 (2018 22:00) (12 - 38)  SpO2: 99% (2018 22:00) (92% - 100%)    ABG - ( 2018 17:30 )  pH, Arterial: 7.33  pH, Blood: x     /  pCO2: 39    /  pO2: 89    / HCO3: x     / Base Excess: x     /  SaO2: 96          I&O's Detail    2018 07:01  -  2018 23:18  --------------------------------------------------------  IN:    0.9% NaCl: 2000 mL    propofol Infusion: 18.9 mL    sodium chloride 0.9%: 200 mL    sodium chloride 0.9%.: 1350 mL    Solution: 50 mL    Solution: 250 mL    Solution: 1300 mL  Total IN: 5168.9 mL    OUT:    Indwelling Catheter - Urethral: 410 mL  Total OUT: 410 mL  Total NET: 4758.9 mL      LABS:                        9.7    25.1  )-----------( 154      ( 2018 18:35 )             31.7     07-14    142  |  112<H>  |  36<H>  ----------------------------<  164<H>  5.1   |  20<L>  |  2.12<H>    Ca    7.2<L>      2018 18:35    TPro  7.9  /  Alb  3.2<L>  /  TBili  0.4  /  DBili  x   /  AST  35  /  ALT  26  /  AlkPhos  92  07-14          CAPILLARY BLOOD GLUCOSE  POCT Blood Glucose.: 180 mg/dL (2018 18:14)      PT/INR - ( 2018 09:14 )   PT: 12.9 sec;   INR: 1.16 ratio    PTT - ( 2018 09:14 )  PTT:31.0 sec      Urinalysis Basic - ( 2018 12:07 )  Color: Yellow / Appearance: Clear / S.010 / pH: x  Gluc: x / Ketone: Negative  / Bili: Negative / Urobili: Negative   Blood: x / Protein: 100 / Nitrite: Negative   Leuk Esterase: Negative / RBC: 5-10 /HPF / WBC 0-2 /HPF   Sq Epi: x / Non Sq Epi: Neg.-Few / Bacteria: Few /HPF      CULTURES:  Blood cultures pending.       RADIOLOGY:   < from: CT Angio Abdomen and Pelvis w/ IV Cont (18 @ 10:22) >  INTERPRETATION:  CLINICAL HISTORY:  Dk in onset of hypoxia; evaluate for   pulmonary embolism. History of aneurysm; assess for aortic rupture.    Multiple axial images of the chest, abdomen and pelvis were obtained from   the lung apices through symphysis pubis with the administration of   intravascular contrast. 100cc of Omnipaque 350 was administered   intravenously without complication. Reformatted coronal and sagittal   images are submitted. MIP images were created utilizing dedicated   computer software.    COMPARISON: CT evaluation of the chest 2018; CT evaluation   abdomen/pelvis 2014.    FINDINGS: No fillingdefects are identified within the segmental   pulmonary arterial tree to indicate pulmonary embolism.    There is evidence for increased caliber of the distal aortic arch   measuring 3.8 cm. A region of atherosclerotic ulceration is identified   along the medial/inferior aspect of the aortic arch (no prior   contrast-enhanced CT evaluation of the chest is available for   comparison). The caliber of the descending thoracic aorta measures up to   3.8 cm, demonstrating prominent mural plaque, unchanged in size. The   caliber of the aorta at the level of the diaphragmatic hiatus is   unchanged measuring 3.7 cm. Aneurysmal dilatation of the abdominal aorta   is identified, significantly increased in size measuring up to 6.0 cm   with circumferential mural thrombus identified. There is no evidence for   aortic rupture or aortic dissection.    Note is made of an indwelling ETT and NGT.    No abnormally enlarged mediastinal or hilar lymph nodes are noted. There   is no evidence for axillary lymphadenopathy. The heart size appears   within normal limits. No pericardial effusion is identified.  The central bronchial anatomy appears patent.    Emphysematous changes are identified bilaterally. Interstitial thickening   is identified within the right upper lobe. There are regions of irregular   nodular opacity and increased interstitial opacity within the right lower   lobe lung parenchyma. Regions of interstitial and airspace opacities are   identified throughout the left upper lobe and left lower lobe with dense   left lower lobe consolidation evident. There is no evidence for pleural   effusion or pneumothorax. No mediastinal shift is noted.    The liver is normal in size. No focal hepatic masses are identified.   There is no evidence for intrahepatic or extrahepatic biliary dilatation.   No gallstones, gallbladder wall thickening or pericholecystic fluid   identified.    The spleen, pancreas and adrenal glands are unremarkable.    Left renal parenchymal atrophy is identified. Thereis no evidence for   bilateral hydronephrosis. A 1.9 cm hypodense focus is identified within   the lower pole aspect of the right kidney, which cannot be characterized   as a simple cyst on this evaluation. No abnormally enlarged   retroperitoneal orpelvic lymphadenopathy is noted.    Fecal material is scattered throughout the colon. There is no evidence   for mechanical bowel obstruction. No colonic wall thickening is   identified. There is no evidence for free intraperitoneal air or fluid.   The appendix is not visualized. The uterus and urinary bladder appear   unremarkable.     Degenerative changes of the thoracic and lumbar spine as well as right   hip region identified.    IMPRESSION:    1. No filling defects identified within the segmental pulmonary arterial   tree to indicate pulmonary was them.  2. There is evidence for increased caliber of the distal aortic arch   measuring 3.8 cm. A region of atherosclerotic ulceration is identified   along the medial/inferior aspect of the aortic arch (no prior   contrast-enhanced CT evaluation of the chest is available for   comparison). The caliber of the descending thoracic aorta measures up to   3.8 cm, demonstrating prominent mural plaque, unchanged in size. The   caliber of the aorta at the level of the diaphragmatic hiatus is   unchanged measuring 3.7 cm. Aneurysmal dilatation of the abdominal aorta   is identified, significantly increased in size measuring up to 6.0 cm   with circumferential mural thrombus identified. There isno evidence for   aortic rupture or aortic dissection.  3. There are regions of irregular nodular opacity and increased   interstitial opacity within the right lower lobe lung parenchyma. Regions   of interstitial and airspace opacities are identified throughout the left   upper lobe and left lower lobe with dense left lower lobe consolidation   evident.     < end of copied text >    < from: Xray Chest 1 View-PORTABLE IMMEDIATE (18 @ 10:18) >  INTERPRETATION:  INDICATION: Respiratory failure    PRIORS: 2018    VIEWS: Portable AP radiography of the chest performed.    FINDINGS: Since prior evaluation an endotracheal tube has been advanced,   the distal tip of which resides approximately 3.5 cm superior to the   tracheal bifurcation. The distal aspect of the indwelling NGT is not   included on the radiographic evaluation. No superior mediastinal widening  is identified. There are new interstitial opacities identified   bilaterally, left greater than right. No pleural effusion or pneumothorax   is demonstrated. No mediastinal shift is noted.    IMPRESSION: Since prior evaluation an endotracheal tube has been   advanced, the distal tip of which resides approximately 3.5 cm superior   to the tracheal bifurcation. There are new interstitial opacities   identified bilaterally, left greater than right.     < end of copied text >      SUPPLEMENTAL O2: AC/VC  LINES: Peripheral   POSADAS: Y   PPx: Peridex, Heparin, Pantoprazole,   CONTACT: N

## 2018-07-14 NOTE — ED ADULT NURSE REASSESSMENT NOTE - NS ED NURSE REASSESS COMMENT FT1
I attempted to call patients son(emergency contact)  (answering machine) to get patient info.  No message left

## 2018-07-14 NOTE — ED PROVIDER NOTE - CARE PLAN
Principal Discharge DX:	Respiratory distress  Secondary Diagnosis:	Sepsis, due to unspecified organism  Secondary Diagnosis:	HCAP (healthcare-associated pneumonia) Principal Discharge DX:	Respiratory distress  Secondary Diagnosis:	HCAP (healthcare-associated pneumonia)  Secondary Diagnosis:	Severe sepsis

## 2018-07-14 NOTE — H&P ADULT - PROBLEM SELECTOR PLAN 2
Noted with b/l infiltrates: pna   c/w vanco and zosyn   lactate normalized   trend white count   f/u cultures including sputum

## 2018-07-14 NOTE — H&P ADULT - HISTORY OF PRESENT ILLNESS
83F hx of dementia, HTN, COPD/asthma on home o2 on chronic Prednisone, TAA pw from [facility] presents via ambulance from NH for depressed mental status and diff breathing. Pt was hypoxic to 70% on RA, placed on CPAP by EMS with improvement of oxygenation but not mental status. Upon arrival pt on CPAP, eyes open but not answering any ? which is a change from baseline - pt is usually ambulatory and verbal.. Emergently switched to bipap with no improvment. On arrival for evaluation pt poorly saturating, Bipap settings where managed to provide appropriate oxygenation while intubation was being prepared. Abg had been drawn noting she was in hypercapnic/hypoxemix resp failure. Intubation was successful, sepsis protocol initated in ED, pt was taken to CT for CTA of chest concerned for PE and AAA. Pt noted to have b/l infilitrates. unable to assess further history 2/2 to pt mental status.

## 2018-07-15 PROBLEM — J44.9 CHRONIC OBSTRUCTIVE PULMONARY DISEASE, UNSPECIFIED: Chronic | Status: ACTIVE | Noted: 2018-06-20

## 2018-07-15 PROBLEM — J45.909 UNSPECIFIED ASTHMA, UNCOMPLICATED: Chronic | Status: ACTIVE | Noted: 2018-06-20

## 2018-07-15 PROBLEM — I10 ESSENTIAL (PRIMARY) HYPERTENSION: Chronic | Status: ACTIVE | Noted: 2018-06-20

## 2018-07-15 PROBLEM — F32.9 MAJOR DEPRESSIVE DISORDER, SINGLE EPISODE, UNSPECIFIED: Chronic | Status: ACTIVE | Noted: 2018-06-20

## 2018-07-15 LAB
ALBUMIN SERPL ELPH-MCNC: 1.9 G/DL — LOW (ref 3.3–5)
ALP SERPL-CCNC: 94 U/L — SIGNIFICANT CHANGE UP (ref 40–120)
ALT FLD-CCNC: 65 U/L DA — HIGH (ref 10–45)
ANION GAP SERPL CALC-SCNC: 11 MMOL/L — SIGNIFICANT CHANGE UP (ref 5–17)
ANION GAP SERPL CALC-SCNC: 13 MMOL/L — SIGNIFICANT CHANGE UP (ref 5–17)
AST SERPL-CCNC: 69 U/L — HIGH (ref 10–40)
BASOPHILS # BLD AUTO: 0.1 K/UL — SIGNIFICANT CHANGE UP (ref 0–0.2)
BASOPHILS NFR BLD AUTO: 0.3 % — SIGNIFICANT CHANGE UP (ref 0–2)
BILIRUB SERPL-MCNC: 0.3 MG/DL — SIGNIFICANT CHANGE UP (ref 0.2–1.2)
BLOOD GAS COMMENTS ARTERIAL: SIGNIFICANT CHANGE UP
BUN SERPL-MCNC: 40 MG/DL — HIGH (ref 7–23)
BUN SERPL-MCNC: 45 MG/DL — HIGH (ref 7–23)
CALCIUM SERPL-MCNC: 7.4 MG/DL — LOW (ref 8.4–10.5)
CALCIUM SERPL-MCNC: 7.7 MG/DL — LOW (ref 8.4–10.5)
CHLORIDE SERPL-SCNC: 110 MMOL/L — HIGH (ref 96–108)
CHLORIDE SERPL-SCNC: 110 MMOL/L — HIGH (ref 96–108)
CO2 BLDA-SCNC: 19 MMOL/L — LOW (ref 22–30)
CO2 BLDA-SCNC: 19 MMOL/L — LOW (ref 22–30)
CO2 SERPL-SCNC: 20 MMOL/L — LOW (ref 22–31)
CO2 SERPL-SCNC: 21 MMOL/L — LOW (ref 22–31)
CREAT SERPL-MCNC: 2.36 MG/DL — HIGH (ref 0.5–1.3)
CREAT SERPL-MCNC: 2.39 MG/DL — HIGH (ref 0.5–1.3)
GAS PNL BLDA: SIGNIFICANT CHANGE UP
GAS PNL BLDA: SIGNIFICANT CHANGE UP
GLUCOSE BLDC GLUCOMTR-MCNC: 130 MG/DL — HIGH (ref 70–99)
GLUCOSE BLDC GLUCOMTR-MCNC: 137 MG/DL — HIGH (ref 70–99)
GLUCOSE BLDC GLUCOMTR-MCNC: 169 MG/DL — HIGH (ref 70–99)
GLUCOSE BLDC GLUCOMTR-MCNC: 202 MG/DL — HIGH (ref 70–99)
GLUCOSE BLDC GLUCOMTR-MCNC: 203 MG/DL — HIGH (ref 70–99)
GLUCOSE SERPL-MCNC: 140 MG/DL — HIGH (ref 70–99)
GLUCOSE SERPL-MCNC: 204 MG/DL — HIGH (ref 70–99)
HCT VFR BLD CALC: 27.6 % — LOW (ref 34.5–45)
HGB BLD-MCNC: 8.4 G/DL — LOW (ref 11.5–15.5)
HOROWITZ INDEX BLDA+IHG-RTO: SIGNIFICANT CHANGE UP
HOROWITZ INDEX BLDA+IHG-RTO: SIGNIFICANT CHANGE UP
LACTATE SERPL-SCNC: 1 MMOL/L — SIGNIFICANT CHANGE UP (ref 0.7–2)
LYMPHOCYTES # BLD AUTO: 0.4 K/UL — LOW (ref 1–3.3)
LYMPHOCYTES # BLD AUTO: 2.4 % — LOW (ref 13–44)
MAGNESIUM SERPL-MCNC: 1.5 MG/DL — LOW (ref 1.6–2.6)
MCHC RBC-ENTMCNC: 24.3 PG — LOW (ref 27–34)
MCHC RBC-ENTMCNC: 30.4 GM/DL — LOW (ref 32–36)
MCV RBC AUTO: 80.1 FL — SIGNIFICANT CHANGE UP (ref 80–100)
MONOCYTES # BLD AUTO: 0.7 K/UL — SIGNIFICANT CHANGE UP (ref 0–0.9)
MONOCYTES NFR BLD AUTO: 4 % — SIGNIFICANT CHANGE UP (ref 2–14)
NEUTROPHILS # BLD AUTO: 15.8 K/UL — HIGH (ref 1.8–7.4)
NEUTROPHILS NFR BLD AUTO: 93.2 % — HIGH (ref 43–77)
PCO2 BLDA: 38 MMHG — SIGNIFICANT CHANGE UP (ref 32–46)
PCO2 BLDA: 54 MMHG — HIGH (ref 32–46)
PH BLDA: 7.13 — CRITICAL LOW (ref 7.35–7.45)
PH BLDA: 7.3 — LOW (ref 7.35–7.45)
PHOSPHATE SERPL-MCNC: 3.3 MG/DL — SIGNIFICANT CHANGE UP (ref 2.5–4.5)
PLATELET # BLD AUTO: 154 K/UL — SIGNIFICANT CHANGE UP (ref 150–400)
PO2 BLDA: 102 MMHG — SIGNIFICANT CHANGE UP (ref 74–108)
PO2 BLDA: 49 MMHG — CRITICAL LOW (ref 74–108)
POTASSIUM SERPL-MCNC: 4.7 MMOL/L — SIGNIFICANT CHANGE UP (ref 3.5–5.3)
POTASSIUM SERPL-MCNC: 4.7 MMOL/L — SIGNIFICANT CHANGE UP (ref 3.5–5.3)
POTASSIUM SERPL-SCNC: 4.7 MMOL/L — SIGNIFICANT CHANGE UP (ref 3.5–5.3)
POTASSIUM SERPL-SCNC: 4.7 MMOL/L — SIGNIFICANT CHANGE UP (ref 3.5–5.3)
PROT SERPL-MCNC: 5.2 G/DL — LOW (ref 6–8.3)
RBC # BLD: 3.45 M/UL — LOW (ref 3.8–5.2)
RBC # FLD: 18.3 % — HIGH (ref 10.3–14.5)
SAO2 % BLDA: 65 % — LOW (ref 92–96)
SAO2 % BLDA: 97 % — HIGH (ref 92–96)
SODIUM SERPL-SCNC: 142 MMOL/L — SIGNIFICANT CHANGE UP (ref 135–145)
SODIUM SERPL-SCNC: 143 MMOL/L — SIGNIFICANT CHANGE UP (ref 135–145)
WBC # BLD: 16.9 K/UL — HIGH (ref 3.8–10.5)
WBC # FLD AUTO: 16.9 K/UL — HIGH (ref 3.8–10.5)

## 2018-07-15 PROCEDURE — 76775 US EXAM ABDO BACK WALL LIM: CPT | Mod: 26

## 2018-07-15 PROCEDURE — 71045 X-RAY EXAM CHEST 1 VIEW: CPT | Mod: 26,76

## 2018-07-15 RX ORDER — SODIUM CHLORIDE 9 MG/ML
1000 INJECTION INTRAMUSCULAR; INTRAVENOUS; SUBCUTANEOUS ONCE
Qty: 0 | Refills: 0 | Status: COMPLETED | OUTPATIENT
Start: 2018-07-15 | End: 2018-07-15

## 2018-07-15 RX ORDER — NOREPINEPHRINE BITARTRATE/D5W 8 MG/250ML
0.05 PLASTIC BAG, INJECTION (ML) INTRAVENOUS
Qty: 16 | Refills: 0 | Status: DISCONTINUED | OUTPATIENT
Start: 2018-07-15 | End: 2018-07-16

## 2018-07-15 RX ORDER — FENTANYL CITRATE 50 UG/ML
0.5 INJECTION INTRAVENOUS
Qty: 2500 | Refills: 0 | Status: DISCONTINUED | OUTPATIENT
Start: 2018-07-15 | End: 2018-07-21

## 2018-07-15 RX ORDER — SODIUM CHLORIDE 9 MG/ML
1000 INJECTION, SOLUTION INTRAVENOUS
Qty: 0 | Refills: 0 | Status: DISCONTINUED | OUTPATIENT
Start: 2018-07-15 | End: 2018-07-15

## 2018-07-15 RX ORDER — FUROSEMIDE 40 MG
40 TABLET ORAL ONCE
Qty: 0 | Refills: 0 | Status: COMPLETED | OUTPATIENT
Start: 2018-07-15 | End: 2018-07-15

## 2018-07-15 RX ORDER — DEXMEDETOMIDINE HYDROCHLORIDE IN 0.9% SODIUM CHLORIDE 4 UG/ML
0.3 INJECTION INTRAVENOUS
Qty: 200 | Refills: 0 | Status: DISCONTINUED | OUTPATIENT
Start: 2018-07-15 | End: 2018-07-18

## 2018-07-15 RX ORDER — FENTANYL CITRATE 50 UG/ML
25 INJECTION INTRAVENOUS ONCE
Qty: 0 | Refills: 0 | Status: DISCONTINUED | OUTPATIENT
Start: 2018-07-15 | End: 2018-07-15

## 2018-07-15 RX ORDER — INSULIN LISPRO 100/ML
VIAL (ML) SUBCUTANEOUS EVERY 6 HOURS
Qty: 0 | Refills: 0 | Status: DISCONTINUED | OUTPATIENT
Start: 2018-07-15 | End: 2018-07-21

## 2018-07-15 RX ORDER — CHLORHEXIDINE GLUCONATE 213 G/1000ML
1 SOLUTION TOPICAL
Qty: 0 | Refills: 0 | Status: DISCONTINUED | OUTPATIENT
Start: 2018-07-15 | End: 2018-07-21

## 2018-07-15 RX ORDER — CEFEPIME 1 G/1
2000 INJECTION, POWDER, FOR SOLUTION INTRAMUSCULAR; INTRAVENOUS EVERY 24 HOURS
Qty: 0 | Refills: 0 | Status: DISCONTINUED | OUTPATIENT
Start: 2018-07-15 | End: 2018-07-20

## 2018-07-15 RX ORDER — FENTANYL CITRATE 50 UG/ML
25 INJECTION INTRAVENOUS EVERY 6 HOURS
Qty: 0 | Refills: 0 | Status: DISCONTINUED | OUTPATIENT
Start: 2018-07-15 | End: 2018-07-21

## 2018-07-15 RX ORDER — SODIUM BICARBONATE 1 MEQ/ML
0.2 SYRINGE (ML) INTRAVENOUS
Qty: 150 | Refills: 0 | Status: DISCONTINUED | OUTPATIENT
Start: 2018-07-15 | End: 2018-07-16

## 2018-07-15 RX ADMIN — DEXMEDETOMIDINE HYDROCHLORIDE IN 0.9% SODIUM CHLORIDE 4.3 MICROGRAM(S)/KG/HR: 4 INJECTION INTRAVENOUS at 12:46

## 2018-07-15 RX ADMIN — Medication 40 MILLIGRAM(S): at 06:44

## 2018-07-15 RX ADMIN — Medication 75 MEQ/KG/HR: at 09:07

## 2018-07-15 RX ADMIN — SODIUM CHLORIDE 150 MILLILITER(S): 9 INJECTION INTRAMUSCULAR; INTRAVENOUS; SUBCUTANEOUS at 02:57

## 2018-07-15 RX ADMIN — HEPARIN SODIUM 5000 UNIT(S): 5000 INJECTION INTRAVENOUS; SUBCUTANEOUS at 13:43

## 2018-07-15 RX ADMIN — CHLORHEXIDINE GLUCONATE 15 MILLILITER(S): 213 SOLUTION TOPICAL at 17:44

## 2018-07-15 RX ADMIN — Medication 1: at 11:48

## 2018-07-15 RX ADMIN — Medication 2: at 23:58

## 2018-07-15 RX ADMIN — HEPARIN SODIUM 5000 UNIT(S): 5000 INJECTION INTRAVENOUS; SUBCUTANEOUS at 06:47

## 2018-07-15 RX ADMIN — Medication 40 MILLIGRAM(S): at 18:16

## 2018-07-15 RX ADMIN — ALBUTEROL 2 PUFF(S): 90 AEROSOL, METERED ORAL at 16:36

## 2018-07-15 RX ADMIN — PIPERACILLIN AND TAZOBACTAM 25 GRAM(S): 4; .5 INJECTION, POWDER, LYOPHILIZED, FOR SOLUTION INTRAVENOUS at 11:46

## 2018-07-15 RX ADMIN — Medication 40 MILLIGRAM(S): at 17:44

## 2018-07-15 RX ADMIN — ALBUTEROL 2 PUFF(S): 90 AEROSOL, METERED ORAL at 22:16

## 2018-07-15 RX ADMIN — Medication 1 PUFF(S): at 16:36

## 2018-07-15 RX ADMIN — Medication 325 MILLIGRAM(S): at 11:58

## 2018-07-15 RX ADMIN — Medication 2: at 17:44

## 2018-07-15 RX ADMIN — PANTOPRAZOLE SODIUM 40 MILLIGRAM(S): 20 TABLET, DELAYED RELEASE ORAL at 11:48

## 2018-07-15 RX ADMIN — CHLORHEXIDINE GLUCONATE 15 MILLILITER(S): 213 SOLUTION TOPICAL at 06:45

## 2018-07-15 RX ADMIN — SODIUM CHLORIDE 2000 MILLILITER(S): 9 INJECTION INTRAMUSCULAR; INTRAVENOUS; SUBCUTANEOUS at 07:07

## 2018-07-15 RX ADMIN — Medication 1 PUFF(S): at 04:46

## 2018-07-15 RX ADMIN — ALBUTEROL 2 PUFF(S): 90 AEROSOL, METERED ORAL at 04:45

## 2018-07-15 RX ADMIN — FENTANYL CITRATE 2.87 MICROGRAM(S)/KG/HR: 50 INJECTION INTRAVENOUS at 13:42

## 2018-07-15 RX ADMIN — HEPARIN SODIUM 5000 UNIT(S): 5000 INJECTION INTRAVENOUS; SUBCUTANEOUS at 21:37

## 2018-07-15 RX ADMIN — Medication 2.69 MICROGRAM(S)/KG/MIN: at 15:25

## 2018-07-15 RX ADMIN — Medication 40 MILLIGRAM(S): at 11:48

## 2018-07-15 RX ADMIN — CEFEPIME 100 MILLIGRAM(S): 1 INJECTION, POWDER, FOR SOLUTION INTRAMUSCULAR; INTRAVENOUS at 15:25

## 2018-07-15 RX ADMIN — Medication 110 MILLIGRAM(S): at 13:42

## 2018-07-15 RX ADMIN — SODIUM CHLORIDE 2000 MILLILITER(S): 9 INJECTION INTRAMUSCULAR; INTRAVENOUS; SUBCUTANEOUS at 05:25

## 2018-07-15 RX ADMIN — Medication 40 MILLIGRAM(S): at 23:58

## 2018-07-15 RX ADMIN — ALBUTEROL 2 PUFF(S): 90 AEROSOL, METERED ORAL at 09:49

## 2018-07-15 RX ADMIN — Medication 1 PUFF(S): at 22:16

## 2018-07-15 RX ADMIN — Medication 60 MILLIGRAM(S): at 06:43

## 2018-07-15 RX ADMIN — Medication 1 PUFF(S): at 09:49

## 2018-07-15 RX ADMIN — DEXMEDETOMIDINE HYDROCHLORIDE IN 0.9% SODIUM CHLORIDE 4.3 MICROGRAM(S)/KG/HR: 4 INJECTION INTRAVENOUS at 08:12

## 2018-07-15 NOTE — PROGRESS NOTE ADULT - SUBJECTIVE AND OBJECTIVE BOX
Patient is a 83y old  Female who presents with a chief complaint of Acute respiratory failure (2018 15:28)    Patient seen in follow up for ODC, ATN. BP low. Remains on vent support. Bicarb added to IVF.     PAST MEDICAL HISTORY:  Thoracic aortic aneurysm without rupture  Hypertension  Abdominal aneurysm  Asthma  Depression  COPD (chronic obstructive pulmonary disease)    MEDICATIONS  (STANDING):  ALBUTerol    90 MICROgram(s) HFA Inhaler 2 Puff(s) Inhalation every 6 hours  chlorhexidine 0.12% Liquid 15 milliLiter(s) Swish and Spit two times a day  dexmedetomidine Infusion 0.3 MICROgram(s)/kG/Hr (4.305 mL/Hr) IV Continuous <Continuous>  dextrose 5%. 1000 milliLiter(s) (50 mL/Hr) IV Continuous <Continuous>  dextrose 50% Injectable 12.5 Gram(s) IV Push once  dextrose 50% Injectable 25 Gram(s) IV Push once  dextrose 50% Injectable 25 Gram(s) IV Push once  fentaNYL   Infusion. 0.5 MICROgram(s)/kG/Hr (2.87 mL/Hr) IV Continuous <Continuous>  ferrous sulfate Oral Tab/Cap - Peds 325 milliGRAM(s) Oral daily  heparin  Injectable 5000 Unit(s) SubCutaneous every 8 hours  insulin lispro (HumaLOG) corrective regimen sliding scale   SubCutaneous every 6 hours  ipratropium 17 MICROgram(s) HFA Inhaler 1 Puff(s) Inhalation every 6 hours  methylPREDNISolone sodium succinate Injectable 40 milliGRAM(s) IV Push every 6 hours  pantoprazole  Injectable 40 milliGRAM(s) IV Push daily  piperacillin/tazobactam IVPB. 3.375 Gram(s) IV Intermittent every 12 hours  sodium bicarbonate  Infusion 0.196 mEq/kG/Hr (75 mL/Hr) IV Continuous <Continuous>  vancomycin  IVPB 750 milliGRAM(s) IV Intermittent daily    MEDICATIONS  (PRN):  acetaminophen   Tablet 650 milliGRAM(s) Oral every 6 hours PRN For Temp greater than 38.5 C (101.3 F)  dextrose 40% Gel 15 Gram(s) Oral once PRN Blood Glucose LESS THAN 70 milliGRAM(s)/deciliter  fentaNYL    Injectable 25 MICROGram(s) IV Push every 6 hours PRN Mild Pain (1 - 3)  glucagon  Injectable 1 milliGRAM(s) IntraMuscular once PRN Glucose LESS THAN 70 milligrams/deciliter    T(C): 36.4 (07-15-18 @ 08:00), Max: 37.2 (18 @ 16:00)  HR: 54 (07-15-18 @ 12:00) (54 - 139)  BP: 82/60 (07-15-18 @ 12:00) (65/52 - 210/133)  RR: 20 (07-15-18 @ 12:00) (12 - 38)  SpO2: 98% (07-15-18 @ 12:00) (67% - 100%)  Wt(kg): --  I&O's Detail    2018 07:01  -  15 Jul 2018 07:00  --------------------------------------------------------  IN:    propofol Infusion: 44.5 mL    sodium chloride 0.9%: 2700 mL    sodium chloride 0.9%: 200 mL    Sodium Chloride 0.9% IV Bolus: 4500 mL    Solution: 1300 mL    Solution: 250 mL    Solution: 100 mL  Total IN: 9094.5 mL    OUT:    Indwelling Catheter - Urethral: 580 mL  Total OUT: 580 mL    Total NET: 8514.5 mL      15 Jul 2018 07:01  -  15 Jul 2018 12:37  --------------------------------------------------------  IN:    dexmedetomidine Infusion: 28.8 mL    propofol Infusion: 5.2 mL    sodium bicarbonate  Infusion: 300 mL    sodium chloride 0.9%: 75 mL  Total IN: 409 mL    OUT:    Indwelling Catheter - Urethral: 60 mL  Total OUT: 60 mL    Total NET: 349 mL          PHYSICAL EXAM:  General: on vent  Respiratory: b/l air entry  Cardiovascular: S1 S2  Gastrointestinal: soft  Extremities:  + edema                          8.4    16.9  )-----------( 154      ( 15 Jul 2018 05:20 )             27.6     07-15    143  |  110<H>  |  40<H>  ----------------------------<  140<H>  4.7   |  20<L>  |  2.39<H>    Ca    7.4<L>      15 Jul 2018 05:20  Phos  3.3     07-15    TPro  5.2<L>  /  Alb  1.9<L>  /  TBili  0.3  /  DBili  x   /  AST  69<H>  /  ALT  65<H>  /  AlkPhos  94  07-15        LIVER FUNCTIONS - ( 15 Jul 2018 05:20 )  Alb: 1.9 g/dL / Pro: 5.2 g/dL / ALK PHOS: 94 U/L / ALT: 65 U/L DA / AST: 69 U/L / GGT: x           Urinalysis Basic - ( 2018 12:07 )    Color: Yellow / Appearance: Clear / S.010 / pH: x  Gluc: x / Ketone: Negative  / Bili: Negative / Urobili: Negative   Blood: x / Protein: 100 / Nitrite: Negative   Leuk Esterase: Negative / RBC: 5-10 /HPF / WBC 0-2 /HPF   Sq Epi: x / Non Sq Epi: Neg.-Few / Bacteria: Few /HPF      ABG - ( 15 Jul 2018 07:53 )  pH, Arterial: 7.13  pH, Blood: x     /  pCO2: 54    /  pO2: 49    / HCO3: x     / Base Excess: x     /  SaO2: 65                Sodium, Serum: 143 (07-15 @ 05:20)  Sodium, Serum: 142 ( @ 18:35)  Sodium, Serum: 142 ( @ 09:14)    Creatinine, Serum: 2.39 (07-15 @ 05:20)  Creatinine, Serum: 2.12 ( @ 18:35)  Creatinine, Serum: 2.42 ( @ 09:14)    Potassium, Serum: 4.7 (07-15 @ 05:20)  Potassium, Serum: 5.1 ( @ 18:35)  Potassium, Serum: 4.8 ( @ 09:14)    Hemoglobin: 8.4 (07-15 @ 05:20)  Hemoglobin: 9.7 ( @ 18:35)  Hemoglobin: 10.8 ( @ 09:14)    < from: US Renal (07.15.18 @ 11:07) >  EXAM:  US KIDNEY(S)      PROCEDURE DATE:  07/15/2018        INTERPRETATION:  CLINICAL INFORMATION: Acute kidney injury    COMPARISON: CT of the abdomen pelvis dated 2000    TECHNIQUE: Sonography of the kidneys and bladder.     FINDINGS:    There is bilateral increased renal cortical echogenicity compatible with   medical renal disease.    Right kidney:  9.5 cm. No renal mass, hydronephrosis or calculi. 1.4 cm   cyst in the lower pole.    Left kidney:  7.4 cm. No renal mass, hydronephrosis or calculi.    Urinary bladder: Collapsed on Bryson catheter.    Incidental note is made of abdominal aortic aneurysm measuring up to 4.3   cm, as seen on recent CT. In addition, there is trace perisplenic free   fluid.    IMPRESSION:     Increased renal cortical echogenicity compatible with medical renal   disease. 1.4 cm cyst in the lower pole of the right kidney.    Abdominal aortic aneurysm measuring up to 4.3 cm. Correlate with recent   CT.    Trace perisplenic free fluid, nonspecific.    < end of copied text >

## 2018-07-15 NOTE — CONSULT NOTE ADULT - ASSESSMENT
83 y.o. female with COPD , respiratory failure and CKD has a asymptomatic progressive Thoraco-abdominal aneurysm. At this time the patient is not a candidate for surgical repair (sepsis). When the patient is clinically stable she should follow up with Dr. Verde (chief of vascular) at [place] for a possible endovascular repair.

## 2018-07-15 NOTE — PROCEDURE NOTE - PROCEDURE
<<-----Click on this checkbox to enter Procedure Central line placement  07/15/2018    Active  [id number]

## 2018-07-15 NOTE — PROGRESS NOTE ADULT - SUBJECTIVE AND OBJECTIVE BOX
Patient is a 83y old  Female who presents with a chief complaint of Acute respiratory failure (2018 15:28)      BRIEF HOSPITAL COURSE: ***    Events last 24 hours: ***    PAST MEDICAL & SURGICAL HISTORY:  Thoracic aortic aneurysm without rupture  Hypertension  Asthma  Depression  COPD (chronic obstructive pulmonary disease)  No significant past surgical history    Allergies    No Known Allergies    Intolerances      FAMILY HISTORY:      Social History:     Review of Systems:  CONSTITUTIONAL: No fever, chills, or fatigue  EYES: No eye pain, visual disturbances, or discharge  ENMT:  No difficulty hearing, tinnitus, vertigo; No sinus or throat pain  NECK: No pain or stiffness  RESPIRATORY: No cough, wheezing, chills or hemoptysis; No shortness of breath  CARDIOVASCULAR: No chest pain, palpitations, dizziness, or leg swelling  GASTROINTESTINAL: No abdominal or epigastric pain. No nausea, vomiting, or hematemesis; No diarrhea or constipation. No melena or hematochezia.  GENITOURINARY: No dysuria, frequency, hematuria, or incontinence  NEUROLOGICAL: No headaches, memory loss, loss of strength, numbness, or tremors  SKIN: No itching, burning, rashes, or lesions   MUSCULOSKELETAL: No joint pain or swelling; No muscle, back, or extremity pain  PSYCHIATRIC: No depression, anxiety, mood swings, or difficulty sleeping      Vitals During Exam:   HR:   BP:   RR:  sPO2:     Physical Examination:    General: No acute distress.      HEENT: Pupils equal, reactive to light.  Symmetric.    PULM: Clear to auscultation bilaterally, no significant sputum production    CVS: Regular rate and rhythm, no murmurs, rubs, or gallops    ABD: Soft, nondistended, nontender, normoactive bowel sounds, no masses    EXT: No edema, nontender    SKIN: Warm and well perfused, no rashes noted.    NEURO: Alert, oriented, interactive, nonfocal      Medications:  cefepime   IVPB 2000 milliGRAM(s) IV Intermittent every 24 hours  doxycycline IVPB        norepinephrine Infusion 0.05 MICROgram(s)/kG/Min IV Continuous <Continuous>    ALBUTerol    90 MICROgram(s) HFA Inhaler 2 Puff(s) Inhalation every 6 hours  ipratropium 17 MICROgram(s) HFA Inhaler 1 Puff(s) Inhalation every 6 hours    acetaminophen   Tablet 650 milliGRAM(s) Oral every 6 hours PRN  dexmedetomidine Infusion 0.3 MICROgram(s)/kG/Hr IV Continuous <Continuous>  fentaNYL    Injectable 25 MICROGram(s) IV Push every 6 hours PRN  fentaNYL   Infusion. 0.5 MICROgram(s)/kG/Hr IV Continuous <Continuous>      heparin  Injectable 5000 Unit(s) SubCutaneous every 8 hours    pantoprazole  Injectable 40 milliGRAM(s) IV Push daily      dextrose 40% Gel 15 Gram(s) Oral once PRN  dextrose 50% Injectable 12.5 Gram(s) IV Push once  dextrose 50% Injectable 25 Gram(s) IV Push once  dextrose 50% Injectable 25 Gram(s) IV Push once  glucagon  Injectable 1 milliGRAM(s) IntraMuscular once PRN  insulin lispro (HumaLOG) corrective regimen sliding scale   SubCutaneous every 6 hours  methylPREDNISolone sodium succinate Injectable 40 milliGRAM(s) IV Push every 6 hours    dextrose 5%. 1000 milliLiter(s) IV Continuous <Continuous>  ferrous sulfate Oral Tab/Cap - Peds 325 milliGRAM(s) Oral daily  sodium bicarbonate  Infusion 0.196 mEq/kG/Hr IV Continuous <Continuous>      chlorhexidine 0.12% Liquid 15 milliLiter(s) Swish and Spit two times a day  chlorhexidine 4% Liquid 1 Application(s) Topical <User Schedule>        Mode: AC/ CMV (Assist Control/ Continuous Mandatory Ventilation)  RR (machine): 20  TV (machine): 450  FiO2: 50  PEEP: 5  MAP: 10  PIP: 27      ICU Vital Signs Last 24 Hrs  T(C): 36.8 (15 Jul 2018 16:45), Max: 37 (15 Jul 2018 12:00)  T(F): 98.2 (15 Jul 2018 16:45), Max: 98.6 (15 Jul 2018 12:00)  HR: 47 (15 Jul 2018 18:00) (47 - 100)  BP: 119/81 (15 Jul 2018 18:00) (65/52 - 150/76)  BP(mean): 93 (15 Jul 2018 18:00) (58 - 109)  ABP: --  ABP(mean): --  RR: 20 (15 Jul 2018 18:00) (14 - 27)  SpO2: 99% (15 Jul 2018 18:00) (67% - 100%)    Vital Signs Last 24 Hrs  T(C): 36.8 (15 Jul 2018 16:45), Max: 37 (15 Jul 2018 12:00)  T(F): 98.2 (15 Jul 2018 16:45), Max: 98.6 (15 Jul 2018 12:00)  HR: 47 (15 Jul 2018 18:00) (47 - 100)  BP: 119/81 (15 Jul 2018 18:00) (65/52 - 150/76)  BP(mean): 93 (15 Jul 2018 18:00) (58 - 109)  RR: 20 (15 Jul 2018 18:00) (14 - 27)  SpO2: 99% (15 Jul 2018 18:00) (67% - 100%)    ABG - ( 15 Jul 2018 17:40 )  pH, Arterial: 7.30  pH, Blood: x     /  pCO2: 38    /  pO2: 102   / HCO3: x     / Base Excess: x     /  SaO2: 97                  I&O's Detail    2018 07:01  -  15 Jul 2018 07:00  --------------------------------------------------------  IN:    propofol Infusion: 44.5 mL    sodium chloride 0.9%: 2700 mL    sodium chloride 0.9%: 200 mL    Sodium Chloride 0.9% IV Bolus: 4500 mL    Solution: 1300 mL    Solution: 250 mL    Solution: 100 mL  Total IN: 9094.5 mL    OUT:    Indwelling Catheter - Urethral: 580 mL  Total OUT: 580 mL    Total NET: 8514.5 mL      15 Jul 2018 07:01  -  15 Jul 2018 20:14  --------------------------------------------------------  IN:    dexmedetomidine Infusion: 69.1 mL    fentaNYL Infusion.: 22 mL    norepinephrine Infusion: 13.7 mL    propofol Infusion: 5.2 mL    sodium bicarbonate  Infusion: 825 mL    sodium chloride 0.9%: 75 mL    Solution: 150 mL  Total IN: 1160 mL    OUT:    Indwelling Catheter - Urethral: 95 mL  Total OUT: 95 mL    Total NET: 1065 mL            LABS:                        8.4    16.9  )-----------( 154      ( 15 Jul 2018 05:20 )             27.6     07-15    142  |  110<H>  |  45<H>  ----------------------------<  204<H>  4.7   |  21<L>  |  2.36<H>    Ca    7.7<L>      15 Jul 2018 17:53  Phos  3.3     07-15  Mg     1.5     15    TPro  5.2<L>  /  Alb  1.9<L>  /  TBili  0.3  /  DBili  x   /  AST  69<H>  /  ALT  65<H>  /  AlkPhos  94  15          CAPILLARY BLOOD GLUCOSE      POCT Blood Glucose.: 203 mg/dL (15 Jul 2018 17:42)    PT/INR - ( 2018 09:14 )   PT: 12.9 sec;   INR: 1.16 ratio         PTT - ( 2018 09:14 )  PTT:31.0 sec  Urinalysis Basic - ( 2018 12:07 )    Color: Yellow / Appearance: Clear / S.010 / pH: x  Gluc: x / Ketone: Negative  / Bili: Negative / Urobili: Negative   Blood: x / Protein: 100 / Nitrite: Negative   Leuk Esterase: Negative / RBC: 5-10 /HPF / WBC 0-2 /HPF   Sq Epi: x / Non Sq Epi: Neg.-Few / Bacteria: Few /HPF      CULTURES:        RADIOLOGY: ***      SUPPLEMENTAL O2:   LINES:  KATHARINA:   ALICIAx:   CONTACT: Patient is a 83y old  Female who presents with a chief complaint of Acute respiratory failure (2018 15:28)      BRIEF HOSPITAL COURSE:   Patient is an 82 yo female pmhx COPD (not on home O2) on chronic prednisone, HTN, h/o TAA, and depression biba from NH for AMS and respiratory distress.  Upon arrival to ED patient on CPAP, lethargic, opening eyes but not answering questions.  Patient intubated for acute respiratory failure.  CTA obtained due to concern for PE; scan negative for PE, revealed 6mm aneurysm.  Patient transferred to ICU for further management.       Events last 24 hours:       PAST MEDICAL & SURGICAL HISTORY:  Thoracic aortic aneurysm without rupture  Hypertension  Asthma  Depression  COPD (chronic obstructive pulmonary disease)  No significant past surgical history    Allergies  No Known Allergies      FAMILY HISTORY:  Noncontributory       Social History:   Lives in [facility], former smoker.        Review of Systems:  Unable to obtain secondary to patient's mental status.        Vitals During Exam:   HR: 88  BP: 160/116 mmHg  RR: 20  sPO2: 97    Physical Examination:    General: Patient lying in bed, appears agitated.     HEENT: NC/AT, Pupils 2mm, equal, reactive to light.  Symmetric.    PULM: Symmetrical thorax expansion upon respiration.  Clear to auscultation bilaterally, no significant sputum production appreciated.  ET tube in place.     CVS: Regular rate and rhythm, +s1, +s2, no murmurs, rubs, or gallops appreciated.     ABD: Soft, nondistended, nontender, normoactive bowel sounds, no masses appreciated.     EXT: No edema, nontender, DP pulses 2+ symmetrical    SKIN: Warm and well perfused, no rashes noted.    NEURO: Sedated on fentanyl and precedex.       Medications:  cefepime   IVPB 2000 milliGRAM(s) IV Intermittent every 24 hours  doxycycline IVPB      norepinephrine Infusion 0.05 MICROgram(s)/kG/Min IV Continuous <Continuous>  ALBUTerol    90 MICROgram(s) HFA Inhaler 2 Puff(s) Inhalation every 6 hours  ipratropium 17 MICROgram(s) HFA Inhaler 1 Puff(s) Inhalation every 6 hours  acetaminophen   Tablet 650 milliGRAM(s) Oral every 6 hours PRN  dexmedetomidine Infusion 0.3 MICROgram(s)/kG/Hr IV Continuous <Continuous>  fentaNYL    Injectable 25 MICROGram(s) IV Push every 6 hours PRN  fentaNYL   Infusion. 0.5 MICROgram(s)/kG/Hr IV Continuous <Continuous>  heparin  Injectable 5000 Unit(s) SubCutaneous every 8 hours  pantoprazole  Injectable 40 milliGRAM(s) IV Push daily  dextrose 40% Gel 15 Gram(s) Oral once PRN  dextrose 50% Injectable 12.5 Gram(s) IV Push once  dextrose 50% Injectable 25 Gram(s) IV Push once  dextrose 50% Injectable 25 Gram(s) IV Push once  glucagon  Injectable 1 milliGRAM(s) IntraMuscular once PRN  insulin lispro (HumaLOG) corrective regimen sliding scale   SubCutaneous every 6 hours  methylPREDNISolone sodium succinate Injectable 40 milliGRAM(s) IV Push every 6 hours  dextrose 5%. 1000 milliLiter(s) IV Continuous <Continuous>  ferrous sulfate Oral Tab/Cap - Peds 325 milliGRAM(s) Oral daily  sodium bicarbonate  Infusion 0.196 mEq/kG/Hr IV Continuous <Continuous>  chlorhexidine 0.12% Liquid 15 milliLiter(s) Swish and Spit two times a day  chlorhexidine 4% Liquid 1 Application(s) Topical <User Schedule>      Mode: AC/ CMV (Assist Control/ Continuous Mandatory Ventilation)  RR (machine): 20  TV (machine): 450  FiO2: 50  PEEP: 5  MAP: 10  PIP: 27      ICU Vital Signs Last 24 Hrs  T(C): 36.8 (15 Jul 2018 16:45), Max: 37 (15 Jul 2018 12:00)  T(F): 98.2 (15 Jul 2018 16:45), Max: 98.6 (15 Jul 2018 12:00)  HR: 47 (15 Jul 2018 18:00) (47 - 100)  BP: 119/81 (15 Jul 2018 18:00) (65/52 - 150/76)  BP(mean): 93 (15 Jul 2018 18:00) (58 - 109)  ABP: --  ABP(mean): --  RR: 20 (15 Jul 2018 18:00) (14 - 27)  SpO2: 99% (15 Jul 2018 18:00) (67% - 100%)    Vital Signs Last 24 Hrs  T(C): 36.8 (15 Jul 2018 16:45), Max: 37 (15 Jul 2018 12:00)  T(F): 98.2 (15 Jul 2018 16:45), Max: 98.6 (15 Jul 2018 12:00)  HR: 47 (15 Jul 2018 18:00) (47 - 100)  BP: 119/81 (15 Jul 2018 18:00) (65/52 - 150/76)  BP(mean): 93 (15 Jul 2018 18:00) (58 - 109)  RR: 20 (15 Jul 2018 18:00) (14 - 27)  SpO2: 99% (15 Jul 2018 18:00) (67% - 100%)    ABG - ( 15 Jul 2018 17:40 )  pH, Arterial: 7.30  pH, Blood: x     /  pCO2: 38    /  pO2: 102   / HCO3: x     / Base Excess: x     /  SaO2: 97          I&O's Detail    2018 07:01  -  15 Jul 2018 07:00  --------------------------------------------------------  IN:    propofol Infusion: 44.5 mL    sodium chloride 0.9%: 2700 mL    sodium chloride 0.9%: 200 mL    Sodium Chloride 0.9% IV Bolus: 4500 mL    Solution: 1300 mL    Solution: 250 mL    Solution: 100 mL  Total IN: 9094.5 mL    OUT:    Indwelling Catheter - Urethral: 580 mL  Total OUT: 580 mL  Total NET: 8514.5 mL      15 Jul 2018 07:01  -  15 Jul 2018 20:14  --------------------------------------------------------  IN:    dexmedetomidine Infusion: 69.1 mL    fentaNYL Infusion.: 22 mL    norepinephrine Infusion: 13.7 mL    propofol Infusion: 5.2 mL    sodium bicarbonate  Infusion: 825 mL    sodium chloride 0.9%: 75 mL    Solution: 150 mL  Total IN: 1160 mL    OUT:    Indwelling Catheter - Urethral: 95 mL  Total OUT: 95 mL  Total NET: 1065 mL      LABS:                        8.4    16.9  )-----------( 154      ( 15 Jul 2018 05:20 )             27.6     07-15    142  |  110<H>  |  45<H>  ----------------------------<  204<H>  4.7   |  21<L>  |  2.36<H>    Ca    7.7<L>      15 Jul 2018 17:53  Phos  3.3     07-15  Mg     1.5     07-15    TPro  5.2<L>  /  Alb  1.9<L>  /  TBili  0.3  /  DBili  x   /  AST  69<H>  /  ALT  65<H>  /  AlkPhos  94  07-15      CAPILLARY BLOOD GLUCOSE  POCT Blood Glucose.: 203 mg/dL (15 Jul 2018 17:42)      PT/INR - ( 2018 09:14 )   PT: 12.9 sec;   INR: 1.16 ratio    PTT - ( 2018 09:14 )  PTT:31.0 sec      Urinalysis Basic - ( 2018 12:07 )  Color: Yellow / Appearance: Clear / S.010 / pH: x  Gluc: x / Ketone: Negative  / Bili: Negative / Urobili: Negative   Blood: x / Protein: 100 / Nitrite: Negative   Leuk Esterase: Negative / RBC: 5-10 /HPF / WBC 0-2 /HPF   Sq Epi: x / Non Sq Epi: Neg.-Few / Bacteria: Few /HPF      CULTURES:        RADIOLOGY: ***      SUPPLEMENTAL O2:   LINES: R IJ CVC  POSADAS:   PPx:   CONTACT: Patient is a 83y old  Female who presents with a chief complaint of Acute respiratory failure (2018 15:28)      BRIEF HOSPITAL COURSE:   Patient is an 82 yo female pmhx COPD (not on home O2) on chronic prednisone, HTN, h/o TAA, and depression biba from NH for AMS and respiratory distress.  Upon arrival to ED patient on CPAP, lethargic, opening eyes but not answering questions.  Patient intubated for acute respiratory failure.  CTA obtained due to concern for PE; scan negative for PE, revealed 6mm aneurysm.  Patient transferred to ICU for further management.       Events last 24 hours:   Failed SBT due to hypoxia/tachypnea.  Started on Levophed.  DOC worsening. Central line placed by daytime PA.          PAST MEDICAL & SURGICAL HISTORY:  Thoracic aortic aneurysm without rupture  Hypertension  Asthma  Depression  COPD (chronic obstructive pulmonary disease)  No significant past surgical history    Allergies  No Known Allergies      FAMILY HISTORY:  Noncontributory       Social History:   Lives in [facility], former smoker.        Review of Systems:  Unable to obtain secondary to patient's mental status.        Vitals During Exam:   HR: 88  BP: 160/116 mmHg  RR: 20  sPO2: 97    Physical Examination:    General: Patient lying in bed, appears agitated.     HEENT: NC/AT, Pupils 2mm, equal, reactive to light.  Symmetric.    PULM: Symmetrical thorax expansion upon respiration.  Clear to auscultation bilaterally, no significant sputum production appreciated.  ET tube in place.     CVS: Regular rate and rhythm, +s1, +s2, no murmurs, rubs, or gallops appreciated.     ABD: Soft, nondistended, nontender, normoactive bowel sounds, no masses appreciated.     EXT: No edema, nontender, DP pulses 2+ symmetrical    SKIN: Warm and well perfused, no rashes noted.    NEURO: Sedated on fentanyl and precedex.       Medications:  cefepime   IVPB 2000 milliGRAM(s) IV Intermittent every 24 hours  doxycycline IVPB      norepinephrine Infusion 0.05 MICROgram(s)/kG/Min IV Continuous <Continuous>  ALBUTerol    90 MICROgram(s) HFA Inhaler 2 Puff(s) Inhalation every 6 hours  ipratropium 17 MICROgram(s) HFA Inhaler 1 Puff(s) Inhalation every 6 hours  acetaminophen   Tablet 650 milliGRAM(s) Oral every 6 hours PRN  dexmedetomidine Infusion 0.3 MICROgram(s)/kG/Hr IV Continuous <Continuous>  fentaNYL    Injectable 25 MICROGram(s) IV Push every 6 hours PRN  fentaNYL   Infusion. 0.5 MICROgram(s)/kG/Hr IV Continuous <Continuous>  heparin  Injectable 5000 Unit(s) SubCutaneous every 8 hours  pantoprazole  Injectable 40 milliGRAM(s) IV Push daily  dextrose 40% Gel 15 Gram(s) Oral once PRN  dextrose 50% Injectable 12.5 Gram(s) IV Push once  dextrose 50% Injectable 25 Gram(s) IV Push once  dextrose 50% Injectable 25 Gram(s) IV Push once  glucagon  Injectable 1 milliGRAM(s) IntraMuscular once PRN  insulin lispro (HumaLOG) corrective regimen sliding scale   SubCutaneous every 6 hours  methylPREDNISolone sodium succinate Injectable 40 milliGRAM(s) IV Push every 6 hours  dextrose 5%. 1000 milliLiter(s) IV Continuous <Continuous>  ferrous sulfate Oral Tab/Cap - Peds 325 milliGRAM(s) Oral daily  sodium bicarbonate  Infusion 0.196 mEq/kG/Hr IV Continuous <Continuous>  chlorhexidine 0.12% Liquid 15 milliLiter(s) Swish and Spit two times a day  chlorhexidine 4% Liquid 1 Application(s) Topical <User Schedule>      Mode: AC/ CMV (Assist Control/ Continuous Mandatory Ventilation)  RR (machine): 20  TV (machine): 450  FiO2: 50  PEEP: 5  MAP: 10  PIP: 27      ICU Vital Signs Last 24 Hrs  T(C): 36.8 (15 Jul 2018 16:45), Max: 37 (15 Jul 2018 12:00)  T(F): 98.2 (15 Jul 2018 16:45), Max: 98.6 (15 Jul 2018 12:00)  HR: 47 (15 Jul 2018 18:00) (47 - 100)  BP: 119/81 (15 Jul 2018 18:00) (65/52 - 150/76)  BP(mean): 93 (15 Jul 2018 18:00) (58 - 109)  ABP: --  ABP(mean): --  RR: 20 (15 Jul 2018 18:00) (14 - 27)  SpO2: 99% (15 Jul 2018 18:00) (67% - 100%)    Vital Signs Last 24 Hrs  T(C): 36.8 (15 Jul 2018 16:45), Max: 37 (15 Jul 2018 12:00)  T(F): 98.2 (15 Jul 2018 16:45), Max: 98.6 (15 Jul 2018 12:00)  HR: 47 (15 Jul 2018 18:00) (47 - 100)  BP: 119/81 (15 Jul 2018 18:00) (65/52 - 150/76)  BP(mean): 93 (15 Jul 2018 18:00) (58 - 109)  RR: 20 (15 Jul 2018 18:00) (14 - 27)  SpO2: 99% (15 Jul 2018 18:00) (67% - 100%)    ABG - ( 15 Jul 2018 17:40 )  pH, Arterial: 7.30  pH, Blood: x     /  pCO2: 38    /  pO2: 102   / HCO3: x     / Base Excess: x     /  SaO2: 97          I&O's Detail    2018 07:01  -  15 Jul 2018 07:00  --------------------------------------------------------  IN:    propofol Infusion: 44.5 mL    sodium chloride 0.9%: 2700 mL    sodium chloride 0.9%: 200 mL    Sodium Chloride 0.9% IV Bolus: 4500 mL    Solution: 1300 mL    Solution: 250 mL    Solution: 100 mL  Total IN: 9094.5 mL    OUT:    Indwelling Catheter - Urethral: 580 mL  Total OUT: 580 mL  Total NET: 8514.5 mL      15 Jul 2018 07:01  -  15 Jul 2018 20:14  --------------------------------------------------------  IN:    dexmedetomidine Infusion: 69.1 mL    fentaNYL Infusion.: 22 mL    norepinephrine Infusion: 13.7 mL    propofol Infusion: 5.2 mL    sodium bicarbonate  Infusion: 825 mL    sodium chloride 0.9%: 75 mL    Solution: 150 mL  Total IN: 1160 mL    OUT:    Indwelling Catheter - Urethral: 95 mL  Total OUT: 95 mL  Total NET: 1065 mL      LABS:                        8.4    16.9  )-----------( 154      ( 15 Jul 2018 05:20 )             27.6     07-15    142  |  110<H>  |  45<H>  ----------------------------<  204<H>  4.7   |  21<L>  |  2.36<H>    Ca    7.7<L>      15 Jul 2018 17:53  Phos  3.3     07-15  Mg     1.5     07-15    TPro  5.2<L>  /  Alb  1.9<L>  /  TBili  0.3  /  DBili  x   /  AST  69<H>  /  ALT  65<H>  /  AlkPhos  94  07-15      CAPILLARY BLOOD GLUCOSE  POCT Blood Glucose.: 203 mg/dL (15 Jul 2018 17:42)      PT/INR - ( 2018 09:14 )   PT: 12.9 sec;   INR: 1.16 ratio    PTT - ( 2018 09:14 )  PTT:31.0 sec      Urinalysis Basic - ( 2018 12:07 )  Color: Yellow / Appearance: Clear / S.010 / pH: x  Gluc: x / Ketone: Negative  / Bili: Negative / Urobili: Negative   Blood: x / Protein: 100 / Nitrite: Negative   Leuk Esterase: Negative / RBC: 5-10 /HPF / WBC 0-2 /HPF   Sq Epi: x / Non Sq Epi: Neg.-Few / Bacteria: Few /HPF      CULTURES:        RADIOLOGY: ***      SUPPLEMENTAL O2:   LINES: R IOANA [facility]  KATHARINA:   ALICIAx:   CONTACT: Patient is a 83y old  Female who presents with a chief complaint of Acute respiratory failure (2018 15:28)      BRIEF HOSPITAL COURSE:   Patient is an 82 yo female pmhx COPD (not on home O2) on chronic prednisone, HTN, h/o TAA, and depression biba from NH for AMS and respiratory distress.  Upon arrival to ED patient on CPAP, lethargic, opening eyes but not answering questions.  Patient intubated for acute respiratory failure.  CTA obtained due to concern for PE; scan negative for PE, revealed 6mm aneurysm.  Patient transferred to ICU for further management.       Events last 24 hours:   Failed SBT due to hypoxia/tachypnea.  Started on Levophed.  DOC worsening. Central line placed by daytime PA.        PAST MEDICAL & SURGICAL HISTORY:  Thoracic aortic aneurysm without rupture  Hypertension  Asthma  Depression  COPD (chronic obstructive pulmonary disease)  No significant past surgical history    Allergies  No Known Allergies      FAMILY HISTORY:  Noncontributory       Social History:   Lives in [facility], former smoker.        Review of Systems:  Unable to obtain secondary to patient's mental status.        Vitals During Exam:   HR: 88  BP: 160/116 mmHg  RR: 20  sPO2: 97    Physical Examination:    General: Patient lying in bed, appears agitated.     HEENT: NC/AT, Pupils 2mm, equal, reactive to light.  Symmetric.    PULM: Symmetrical thorax expansion upon respiration.  Clear to auscultation bilaterally, no significant sputum production appreciated.  ET tube in place.     CVS: Regular rate and rhythm, +s1, +s2, no murmurs, rubs, or gallops appreciated.     ABD: Soft, nondistended, nontender, normoactive bowel sounds, no masses appreciated.     EXT: No edema, nontender, DP pulses 2+ symmetrical    SKIN: Warm and well perfused, no rashes noted.    NEURO: Sedated on fentanyl and precedex.       Medications:  cefepime   IVPB 2000 milliGRAM(s) IV Intermittent every 24 hours  doxycycline IVPB      norepinephrine Infusion 0.05 MICROgram(s)/kG/Min IV Continuous <Continuous>  ALBUTerol    90 MICROgram(s) HFA Inhaler 2 Puff(s) Inhalation every 6 hours  ipratropium 17 MICROgram(s) HFA Inhaler 1 Puff(s) Inhalation every 6 hours  acetaminophen   Tablet 650 milliGRAM(s) Oral every 6 hours PRN  dexmedetomidine Infusion 0.3 MICROgram(s)/kG/Hr IV Continuous <Continuous>  fentaNYL    Injectable 25 MICROGram(s) IV Push every 6 hours PRN  fentaNYL   Infusion. 0.5 MICROgram(s)/kG/Hr IV Continuous <Continuous>  heparin  Injectable 5000 Unit(s) SubCutaneous every 8 hours  pantoprazole  Injectable 40 milliGRAM(s) IV Push daily  dextrose 40% Gel 15 Gram(s) Oral once PRN  dextrose 50% Injectable 12.5 Gram(s) IV Push once  dextrose 50% Injectable 25 Gram(s) IV Push once  dextrose 50% Injectable 25 Gram(s) IV Push once  glucagon  Injectable 1 milliGRAM(s) IntraMuscular once PRN  insulin lispro (HumaLOG) corrective regimen sliding scale   SubCutaneous every 6 hours  methylPREDNISolone sodium succinate Injectable 40 milliGRAM(s) IV Push every 6 hours  dextrose 5%. 1000 milliLiter(s) IV Continuous <Continuous>  ferrous sulfate Oral Tab/Cap - Peds 325 milliGRAM(s) Oral daily  sodium bicarbonate  Infusion 0.196 mEq/kG/Hr IV Continuous <Continuous>  chlorhexidine 0.12% Liquid 15 milliLiter(s) Swish and Spit two times a day  chlorhexidine 4% Liquid 1 Application(s) Topical <User Schedule>      Mode: AC/ CMV (Assist Control/ Continuous Mandatory Ventilation)  RR (machine): 20  TV (machine): 450  FiO2: 50  PEEP: 5  MAP: 10  PIP: 27      ICU Vital Signs Last 24 Hrs  T(C): 36.8 (15 Jul 2018 16:45), Max: 37 (15 Jul 2018 12:00)  T(F): 98.2 (15 Jul 2018 16:45), Max: 98.6 (15 Jul 2018 12:00)  HR: 47 (15 Jul 2018 18:00) (47 - 100)  BP: 119/81 (15 Jul 2018 18:00) (65/52 - 150/76)  BP(mean): 93 (15 Jul 2018 18:00) (58 - 109)  ABP: --  ABP(mean): --  RR: 20 (15 Jul 2018 18:00) (14 - 27)  SpO2: 99% (15 Jul 2018 18:00) (67% - 100%)    Vital Signs Last 24 Hrs  T(C): 36.8 (15 Jul 2018 16:45), Max: 37 (15 Jul 2018 12:00)  T(F): 98.2 (15 Jul 2018 16:45), Max: 98.6 (15 Jul 2018 12:00)  HR: 47 (15 Jul 2018 18:00) (47 - 100)  BP: 119/81 (15 Jul 2018 18:00) (65/52 - 150/76)  BP(mean): 93 (15 Jul 2018 18:00) (58 - 109)  RR: 20 (15 Jul 2018 18:00) (14 - 27)  SpO2: 99% (15 Jul 2018 18:00) (67% - 100%)    ABG - ( 15 Jul 2018 17:40 )  pH, Arterial: 7.30  pH, Blood: x     /  pCO2: 38    /  pO2: 102   / HCO3: x     / Base Excess: x     /  SaO2: 97          I&O's Detail    2018 07:01  -  15 Jul 2018 07:00  --------------------------------------------------------  IN:    propofol Infusion: 44.5 mL    sodium chloride 0.9%: 2700 mL    sodium chloride 0.9%: 200 mL    Sodium Chloride 0.9% IV Bolus: 4500 mL    Solution: 1300 mL    Solution: 250 mL    Solution: 100 mL  Total IN: 9094.5 mL    OUT:    Indwelling Catheter - Urethral: 580 mL  Total OUT: 580 mL  Total NET: 8514.5 mL      15 Jul 2018 07:01  -  15 Jul 2018 20:14  --------------------------------------------------------  IN:    dexmedetomidine Infusion: 69.1 mL    fentaNYL Infusion.: 22 mL    norepinephrine Infusion: 13.7 mL    propofol Infusion: 5.2 mL    sodium bicarbonate  Infusion: 825 mL    sodium chloride 0.9%: 75 mL    Solution: 150 mL  Total IN: 1160 mL    OUT:    Indwelling Catheter - Urethral: 95 mL  Total OUT: 95 mL  Total NET: 1065 mL      LABS:                        8.4    16.9  )-----------( 154      ( 15 Jul 2018 05:20 )             27.6     07-15    142  |  110<H>  |  45<H>  ----------------------------<  204<H>  4.7   |  21<L>  |  2.36<H>    Ca    7.7<L>      15 Jul 2018 17:53  Phos  3.3     07-15  Mg     1.5     15    TPro  5.2<L>  /  Alb  1.9<L>  /  TBili  0.3  /  DBili  x   /  AST  69<H>  /  ALT  65<H>  /  AlkPhos  94  07-15      CAPILLARY BLOOD GLUCOSE  POCT Blood Glucose.: 203 mg/dL (15 Jul 2018 17:42)      PT/INR - ( 2018 09:14 )   PT: 12.9 sec;   INR: 1.16 ratio    PTT - ( 2018 09:14 )  PTT:31.0 sec      Urinalysis Basic - ( 2018 12:07 )  Color: Yellow / Appearance: Clear / S.010 / pH: x  Gluc: x / Ketone: Negative  / Bili: Negative / Urobili: Negative   Blood: x / Protein: 100 / Nitrite: Negative   Leuk Esterase: Negative / RBC: 5-10 /HPF / WBC 0-2 /HPF   Sq Epi: x / Non Sq Epi: Neg.-Few / Bacteria: Few /HPF      CULTURES:       RADIOLOGY:   < from: Xray Chest 1 View-PORTABLE IMMEDIATE (07.15.18 @ 13:27) >  INTERPRETATION:  Single AP view of the chest. Comparison is made to   radiograph performed at 1137 hours on the same day.    CLINICAL INFORMATION: CVP line    FINDINGS AND   IMPRESSION: There is a right internal jugular central line with the tip   in the region of the SVC. There is an NG tube seen coursing below the   diaphragm with the tip off of the film. There is an endotracheal tube   with the tip seen two vertebral bodies above the morena. There are patchy   opacities in the left lower lobe as well as a small left pleural   effusion. Heart size cannot be accurately evaluated in this projection.    < end of copied text >    < from: US Renal (07.15.18 @ 11:07) >  INTERPRETATION:  CLINICAL INFORMATION: Acute kidney injury    COMPARISON: CT of the abdomen pelvis dated 2000    TECHNIQUE: Sonography of the kidneys and bladder.     FINDINGS:    There is bilateral increased renal cortical echogenicity compatible with   medical renal disease.    Right kidney:  9.5 cm. No renal mass, hydronephrosis or calculi. 1.4 cm   cyst in the lower pole.    Left kidney:  7.4 cm. No renal mass, hydronephrosis or calculi.    Urinary bladder: Collapsed on Posadas catheter.    Incidental note is made of abdominal aortic aneurysm measuring up to 4.3   cm, as seen on recent CT. In addition, there is trace perisplenic free   fluid.    IMPRESSION:     Increased renal cortical echogenicity compatible with medical renal   disease. 1.4 cm cyst in the lower pole of the right kidney.    Abdominal aortic aneurysm measuring up to 4.3 cm. Correlate with recent   CT.    Trace perisplenic free fluid, nonspecific.    < end of copied text >    < from: Xray Chest 1 View- PORTABLE-Routine (07.15.18 @ 07:22) >  INTERPRETATION:  Single AP view of the chest. Comparison made to   2018.    CLINICAL INFORMATION: Acute respiratory failure    FINDINGS AND   IMPRESSION:   There is anendotracheal tube with the tip seen two   vertebral bodies above the morena. There is an NG tube seen coursing   below the diaphragm with the tip off of the film. There are worsening   bilateral diffuse patchy airspace opacities, worst in the left lower   lobe. Heart size cannot be accurately evaluated in this projection.    < end of copied text >          SUPPLEMENTAL O2:   LINES: R IJ CVC  POSADAS:   PPx:   CONTACT:

## 2018-07-15 NOTE — CONSULT NOTE ADULT - SUBJECTIVE AND OBJECTIVE BOX
History of Present Illness:  83y Female with a history of COPD, and TAA was admitted yesterday with acute respiratory failure requiring intubation. Chest xray and CT c/w bilateral pneumonias with sepsis. CTA of chest and abdomen shows progression of AAA.  The descending aorta is 3.8 cm and the infrarenal aorta is 6cm. There is no evidence for rupture. I was requested to evaluate her TAA.  The patient has no abdominal pain.    PAST MEDICAL & SURGICAL HISTORY:  Thoracic aortic aneurysm without rupture  Hypertension  Asthma  Depression  COPD (chronic obstructive pulmonary disease)  No significant past surgical history      Allergies    No Known Allergies    Intolerances        MEDICATIONS  (STANDING):  ALBUTerol    90 MICROgram(s) HFA Inhaler 2 Puff(s) Inhalation every 6 hours  chlorhexidine 0.12% Liquid 15 milliLiter(s) Swish and Spit two times a day  dexmedetomidine Infusion 0.3 MICROgram(s)/kG/Hr (4.305 mL/Hr) IV Continuous <Continuous>  dextrose 5%. 1000 milliLiter(s) (50 mL/Hr) IV Continuous <Continuous>  dextrose 50% Injectable 12.5 Gram(s) IV Push once  dextrose 50% Injectable 25 Gram(s) IV Push once  dextrose 50% Injectable 25 Gram(s) IV Push once  ferrous sulfate Oral Tab/Cap - Peds 325 milliGRAM(s) Oral daily  heparin  Injectable 5000 Unit(s) SubCutaneous every 8 hours  insulin lispro (HumaLOG) corrective regimen sliding scale   SubCutaneous every 6 hours  ipratropium 17 MICROgram(s) HFA Inhaler 1 Puff(s) Inhalation every 6 hours  methylPREDNISolone sodium succinate Injectable 40 milliGRAM(s) IV Push every 6 hours  pantoprazole  Injectable 40 milliGRAM(s) IV Push daily  piperacillin/tazobactam IVPB. 3.375 Gram(s) IV Intermittent every 12 hours  sodium bicarbonate  Infusion 0.196 mEq/kG/Hr (75 mL/Hr) IV Continuous <Continuous>  vancomycin  IVPB 750 milliGRAM(s) IV Intermittent daily    MEDICATIONS  (PRN):  acetaminophen   Tablet 650 milliGRAM(s) Oral every 6 hours PRN For Temp greater than 38.5 C (101.3 F)  dextrose 40% Gel 15 Gram(s) Oral once PRN Blood Glucose LESS THAN 70 milliGRAM(s)/deciliter  fentaNYL    Injectable 25 MICROGram(s) IV Push every 6 hours PRN Mild Pain (1 - 3)  glucagon  Injectable 1 milliGRAM(s) IntraMuscular once PRN Glucose LESS THAN 70 milligrams/deciliter      Social History:  Smoking History: no  Alcohol Use: no    REVIEW OF SYSTEMS:  CONSTITUTIONAL: ++ weakness, and  fevers.   EYES/ENT: No visual changes;  No vertigo or throat pain   NECK: No pain or stiffness  RESPIRATORY: No cough, wheezing, hemoptysis; +++ shortness of breath  CARDIOVASCULAR: No chest pain or palpitations  GASTROINTESTINAL: No abdominal or epigastric pain. No nausea, vomiting, or hematemesis; No diarrhea or constipation. No melena or hematochezia.  GENITOURINARY: No dysuria, frequency or hematuria. +++ oliguria  NEUROLOGICAL: No numbness or weakness  SKIN: No itching, burning, rashes, or lesions   Vascular:  No lower extremity claudication, pedal rest pain or digital ulcers  All other review of systems is negative unless indicated above.    PHYSICAL EXAM:  General:  On exam, the patient is a intubated Female   Vital Signs Last 24 Hrs  T(C): 36.4 (15 Jul 2018 05:00), Max: 37.2 (14 Jul 2018 16:00)  T(F): 97.6 (15 Jul 2018 05:00), Max: 98.9 (14 Jul 2018 16:00)  HR: 79 (15 Jul 2018 06:00) (60 - 139)  BP: 85/64 (15 Jul 2018 06:00) (65/52 - 153/94)  BP(mean): 72 (15 Jul 2018 06:00) (58 - 109)  RR: 16 (15 Jul 2018 06:00) (12 - 38)  SpO2: 67% (15 Jul 2018 06:00) (67% - 100%)    Neck:  4+/4+ bilateral carotid pulses; no carotid bruit, no palpable cervical masses.  Heart:  Regular, no murmurs, rubs or gallops.    Lungs:  decreased BS both bases    Chest:  No chest wall deformities  Symmetrical chest expansion.   Abdomen: Soft and nontender.  No rebound, guarding or rigidity. Midabdominal nontender pulsatile mass  Extremities:  Feet are warm, pink with normal capillary refill times.  There are no digital ulcers or heel decubiti.  The calf and thigh muscles are soft and nontender.  There are no palpable cords or limb cellulitis.  Hai's sign is negative bilaterally.  There is no lower extremity edema, cyanosis, or rubor.  On examination of the peripheral pulses:  Left leg femoral pulse is 4/4   , popliteal pulse is 4/4   ,PT Pulse is 2/4   , DP Pulse is 2/4   Right leg femoral pulse is 4/4    ,popliteal pulse is  4/4  , PT Pulse is 2/4  , DP Pulse is 2/4   Neurological:  There are no motor or sensory deficits in either lower extremity.                          8.4    16.9  )-----------( 154      ( 15 Jul 2018 05:20 )             27.6     07-15    143  |  110<H>  |  40<H>  ----------------------------<  140<H>  4.7   |  20<L>  |  2.39<H>    Ca    7.4<L>      15 Jul 2018 05:20  Phos  3.3     07-15    TPro  5.2<L>  /  Alb  1.9<L>  /  TBili  0.3  /  DBili  x   /  AST  69<H>  /  ALT  65<H>  /  AlkPhos  94  07-15        PT/INR - ( 14 Jul 2018 09:14 )   PT: 12.9 sec;   INR: 1.16 ratio         PTT - ( 14 Jul 2018 09:14 )  PTT:31.0 sec    Radiology:

## 2018-07-15 NOTE — PROGRESS NOTE ADULT - SUBJECTIVE AND OBJECTIVE BOX
Follow-up Critical Care Progress Note  Chief Complaint : Acute respiratory distress      overnight events reviewed  required increase IV bolus   urine output borderline  this am cpap trial done  pt became hypoxic and has severe et tube secretions          Allergies :No Known Allergies      PAST MEDICAL & SURGICAL HISTORY:  Thoracic aortic aneurysm without rupture  Hypertension  Asthma  Depression  COPD (chronic obstructive pulmonary disease)  No significant past surgical history      Medications:  MEDICATIONS  (STANDING):  ALBUTerol    90 MICROgram(s) HFA Inhaler 2 Puff(s) Inhalation every 6 hours  chlorhexidine 0.12% Liquid 15 milliLiter(s) Swish and Spit two times a day  dexmedetomidine Infusion 0.3 MICROgram(s)/kG/Hr (4.305 mL/Hr) IV Continuous <Continuous>  dextrose 5%. 1000 milliLiter(s) (50 mL/Hr) IV Continuous <Continuous>  dextrose 50% Injectable 12.5 Gram(s) IV Push once  dextrose 50% Injectable 25 Gram(s) IV Push once  dextrose 50% Injectable 25 Gram(s) IV Push once  ferrous sulfate Oral Tab/Cap - Peds 325 milliGRAM(s) Oral daily  heparin  Injectable 5000 Unit(s) SubCutaneous every 8 hours  insulin lispro (HumaLOG) corrective regimen sliding scale   SubCutaneous every 6 hours  ipratropium 17 MICROgram(s) HFA Inhaler 1 Puff(s) Inhalation every 6 hours  methylPREDNISolone sodium succinate Injectable 40 milliGRAM(s) IV Push every 6 hours  pantoprazole  Injectable 40 milliGRAM(s) IV Push daily  piperacillin/tazobactam IVPB. 3.375 Gram(s) IV Intermittent every 12 hours  sodium bicarbonate  Infusion 0.196 mEq/kG/Hr (75 mL/Hr) IV Continuous <Continuous>  vancomycin  IVPB 750 milliGRAM(s) IV Intermittent daily    MEDICATIONS  (PRN):  acetaminophen   Tablet 650 milliGRAM(s) Oral every 6 hours PRN For Temp greater than 38.5 C (101.3 F)  dextrose 40% Gel 15 Gram(s) Oral once PRN Blood Glucose LESS THAN 70 milliGRAM(s)/deciliter  fentaNYL    Injectable 25 MICROGram(s) IV Push every 6 hours PRN Mild Pain (1 - 3)  glucagon  Injectable 1 milliGRAM(s) IntraMuscular once PRN Glucose LESS THAN 70 milligrams/deciliter      LABS:                        8.4    16.9  )-----------( 154      ( 15 Jul 2018 05:20 )             27.6     07-15    143  |  110<H>  |  40<H>  ----------------------------<  140<H>  4.7   |  20<L>  |  2.39<H>    07-15-18 @ 05:20  BUN/CR -  40<H> / 2.39<H>  18 @ 18:35  BUN/CR -  36<H> / 2.12<H>  18 @ 09:14  BUN/CR -  41<H> / 2.42<H>    Ca    7.4<L>      15 Jul 2018 05:20  Phos  3.3     07-15    TPro  5.2<L>  /  Alb  1.9<L>  /  TBili  0.3  /  DBili  x   /  AST  69<H>  /  ALT  65<H>  /  AlkPhos  94  07-15            PT/INR - ( 2018 09:14 )   PT: 12.9 sec;   INR: 1.16 ratio         PTT - ( 2018 09:14 )  PTT:31.0 sec  Urinalysis Basic - ( 2018 12:07 )    Color: Yellow / Appearance: Clear / S.010 / pH: x  Gluc: x / Ketone: Negative  / Bili: Negative / Urobili: Negative   Blood: x / Protein: 100 / Nitrite: Negative   Leuk Esterase: Negative / RBC: 5-10 /HPF / WBC 0-2 /HPF   Sq Epi: x / Non Sq Epi: Neg.-Few / Bacteria: Few /HPF              CULTURES: (if applicable)  Blood  cultures pending      ABG - ( 15 Jul 2018 07:53 )  pH, Arterial: 7.13  pH, Blood: x     /  pCO2: 54    /  pO2: 49    / HCO3: x     / Base Excess: x     /  SaO2: 65        RADIOLOGY  CXR:  < from: Xray Chest 1 View- PORTABLE-Routine (07.15.18 @ 07:22) >    IMPRESSION:   There is anendotracheal tube with the tip seen two vertebral bodies above the morena. There is an NG tube seen coursing below the diaphragm with the tip off of the film. There are worsening bilateral diffuse patchy airspace opacities, worst in the left lower lobe. Heart size cannot be accurately evaluated in this projection.    < end of copied text >      CT:  < from: CT Angio Abdomen and Pelvis w/ IV Cont (18 @ 10:22) >  IMPRESSION:    1. No filling defects identified within the segmental pulmonary arterial tree to indicate pulmonary Embolus.  2. There is evidence for increased caliber of the distal aortic arch measuring 3.8 cm. A region of atherosclerotic ulceration is identified along the medial/inferior aspect of the aortic arch (no prior contrast-enhanced CT evaluation of the chest is available for comparison). The caliber of the descending thoracic aorta measures up to 3.8 cm, demonstrating prominent mural plaque, unchanged in size. The caliber of the aorta at the level of the diaphragmatic hiatus is unchanged measuring 3.7 cm. Aneurysmal dilatation of the abdominal aorta is identified, significantly increased in size measuring up to 6.0 cm with circumferential mural thrombus identified. There is no evidence for aortic rupture or aortic dissection.  3. There are regions of irregular nodular opacity and increased interstitial opacity within the right lower lobe lung parenchyma. Regions of interstitial and airspace opacities are identified throughout the left upper lobe and left lower lobe with dense left lower lobe consolidation evident.     < end of copied text >    ECHO:  < from: TTE Echo Complete w/Doppler (18 @ 08:59) >   1. Left ventricular ejection fraction, by visual estimation, is 55 to 60%. limited views for technical reasons   2. Normal global left ventricular systolic function.   3. Normal right ventricular size and function.   4. There is no evidence of pericardial effusion.   5. Trace tricuspid regurgitation.   6. Sclerotic aortic valve with normal opening.   7. Dilatation of the descending aorta.   8. Increased relative wall thickness with normal mass index consistent with left ventricular concentric remodeling.    < end of copied text >      VITALS:  T(C): 36.4 (07-15-18 @ 05:00), Max: 37.2 (18 @ 16:00)  T(F): 97.6 (07-15-18 @ 05:00), Max: 98.9 (18 @ 16:00)  HR: 79 (07-15-18 @ 06:00) (60 - 125)  BP: 85/64 (07-15-18 @ 06:00) (65/52 - 153/94)  BP(mean): 72 (07-15-18 @ 06:00) (58 - 109)  ABP: --  ABP(mean): --  RR: 16 (07-15-18 @ 06:00) (12 - 20)  SpO2: 67% (07-15-18 @ 06:00) (67% - 100%)  CVP(mm Hg): --  CVP(cm H2O): --    Ins and Outs     18 @ 07:01  -  07-15-18 @ 07:00  --------------------------------------------------------  IN: 8939.3 mL / OUT: 570 mL / NET: 8369.3 mL        Height (cm): 160.02 (18 @ 10:30)  Weight (kg): 57.4 (18 @ 10:30)  BMI (kg/m2): 22.4 (18 @ 10:30)    Device: 840, Mode: CPAP with PS, RR (patient): 19, TV (patient): 375, FiO2: 30, PEEP: 5, PS: 10, MAP: 9.2, PIP: 15

## 2018-07-15 NOTE — PROCEDURE NOTE - NSSIZEINFR_GEN_A_CORE
cc:

LAURY PETIT M.D.

****

 

 

DATE OF CONSULTATION:  09/03/2017.

 

REASON FOR CONSULTATION:

Transverse myelitis.

 

HISTORY OF PRESENT ILLNESS:

The patient a pleasant 48-year-old woman with history hypertension, irritable

bowel syndrome, history of transverse myelitis who had been extensively

evaluated in [place] I am told who comes in because of paresthesias in the

left arm and leg with heaviness of the leg.  She was given a Medrol Dosepak by

her primary care doctor but did not improve and came into the hospital. She

also had trouble voiding her bladder.  They had to straight cath her and there

was quite a bit of urine, she states, over 1200 cc.

 

She is doing much better this morning after two doses of Solu-Medrol. She is

actually walking a lot better.  Her symptoms in the arms have improved.

 

PAST MEDICAL HISTORY:

She has a history as stated with an addition of possible myocardial

infarction.

 

PAST SURGICAL HISTORY:

1. Cardiac catheterization normal.

2. Bladder sling.

3. Tubal ligation.

4. Hysterectomy.

 

SOCIAL HISTORY:

She is .  She is a chronic smoker. She smokes one pack of cigarettes a

day. No alcohol.  No drugs.

 

ALLERGIES:

None reported.

 

FAMILY HISTORY:

Father history of prostate cancer.  Mother lupus, diabetes, thyroid.

 

 

 

PHYSICAL EXAMINATION:

VITAL SIGNS:  Temperature is 97.6, pulse 61, respiratory rate 18, blood

pressure 139/86.

NECK: The neck is supple.

HEART: Regular.

NEUROLOGICAL EXAMINATION: She is awake and alert and she is oriented and

fluent.  The pupils are reactive. Face symmetrical.  Tongue midline.  Motor -

has a little bit of weakness in the left  but otherwise she is at best of

5- /5 on the left compared to the right.  There is no drift.  No leg lag.  She

has decreased sensory on the left compared to the right.  She does have a right

Ana Maria's and slightly brisk reflexes in the extremities with no clonus.

Cerebellar testing normal.  Gait is withheld for physical therapy.

 

LABORATORY STUDIES:

Her labs are reviewed.

 

Sedimentation rate is 2.

 

CBC reviewed as well.

 

Chemistries: Albumin 3.2.

 

TSH 1.990.

 

GFR 88, glucose 111.

 

Coag panel normal.

 

Urine - small occult blood. Culture is indicated and is pending.

 

IMAGING STUDIES:

Brain MRI did not see anything acute.

 

Cervical spine - there was no abnormal enhancement but I do see of old signs of

a transverse myelitis of C3-C5.

 

IMPRESSION:

Recurring symptoms in a patient with transverse myelitis.

 

PLAN:

1. Continue Solu-Medrol 500 milligrams q.12 h for a total of three days six

doses.

2. Physical therapy evaluation.

3. Occupational therapy evaluation.

4. If stable can be discharged in 24 hours and have her follow up as an

outpatient with neurology.

5. Continue current care as outlined.

 

Discussed with the patient.

 

 

 

                              _________________________________

                              MD WOLFGANG Smith/SHRUTHI

D:  9/3/2017/10:25 AM

T:  9/3/2017/10:38 AM

Visit #:  [id number]

Job #:  [id number]

## 2018-07-15 NOTE — PROGRESS NOTE ADULT - ASSESSMENT
84 yo female pmhx COPD (not on home O2) on chronic prednisone, HTN, h/o TAA, and depression biba from NH for AMS and respiratory distress.  Upon arrival to ED patient on CPAP, lethargic, opening eyes but not answering questions.  Patient intubated for acute respiratory failure.    NEURO: Patient sedated with fentanyl and precedex. Wean for RASS of -1 to 0.  Precedex weaned due to bradycardia.    CV: Septic Shock.  On levophed, titrate for MAP >65.   Cultures pending.  On broad spectrum abx.  TAA 6mm, vascular following.  HTN, will restart home antihypertensives when shock state resolves.    RESP: Acute Respiratory Failure: intubated AC/VC, Lung protective strategies 6cc/kg tidal volume, FiO2 of 50% with sPO2 of 100%, FiO2 weaned to 40% now with sPO2 92%.  Keep HOB >30 degrees.  Peridex for VAP ppx.  Heparin for chemical VTE ppx.  Pantoprazole for stress ulcer ppx.  HCAP on Cefepime and doxycycline.  COPD continue nebs and steroids.  Will try SBT in am.    RENAL: DOC on CKD.  Patient's urine output trended down to 5-10cc/hour.  Given lasix IV for diuresis with good response.  Goal to keep urine output >20cc/hour.  Avoid nephrotoxic medications, renally dose medications, trend urine output, BUN/Cr, electrolytes.  Replace electrolytes as needed.    GI: NPO on ISS.    ENDO: No active issues.   ID: Cefepime and doxycycline as per ID for legionella coverage.    DISPO: Full code.     Critical Care time: 60 minutes assessing presenting problems of acute illness that poses high probability of life threatening deterioration or end organ damage/dysfunction.  Medical decision making including Initiating plan of care, reviewing data, reviewing radiology, discussing with multidisciplinary team, non inclusive of procedures, discussing goals of care with patient/family.

## 2018-07-15 NOTE — PROCEDURE NOTE - NSPOSTPRCRAD_GEN_A_CORE
17cm depth/no pneumothorax/central line located in the superior vena cava/post-procedure radiography performed

## 2018-07-15 NOTE — CONSULT NOTE ADULT - SUBJECTIVE AND OBJECTIVE BOX
HPI:   Patient is a 83y female sn[facility] resident admitted yesterday for sob, found to have respiratory and renal failure, required intubation, remains hypoxic and hypotensive, ct shows left more than right infiltrates, we are called. Patient cannot give any information as she is on vent struggling to breathe. She has very thick secretions from the ETT.     REVIEW OF SYSTEMS:  All other review of systems cannot get  PAST MEDICAL & SURGICAL HISTORY:  Thoracic aortic aneurysm without rupture  Hypertension  Asthma  Depression  COPD (chronic obstructive pulmonary disease)  No significant past surgical history      Allergies    No Known Allergies    Intolerances        Antimicrobials Day #  :2  cefepime   IVPB 2000 milliGRAM(s) IV Intermittent every 24 hours  doxycycline IVPB        Other Medications:  acetaminophen   Tablet 650 milliGRAM(s) Oral every 6 hours PRN  ALBUTerol    90 MICROgram(s) HFA Inhaler 2 Puff(s) Inhalation every 6 hours  chlorhexidine 0.12% Liquid 15 milliLiter(s) Swish and Spit two times a day  dexmedetomidine Infusion 0.3 MICROgram(s)/kG/Hr IV Continuous <Continuous>  dextrose 40% Gel 15 Gram(s) Oral once PRN  dextrose 5%. 1000 milliLiter(s) IV Continuous <Continuous>  dextrose 50% Injectable 12.5 Gram(s) IV Push once  dextrose 50% Injectable 25 Gram(s) IV Push once  dextrose 50% Injectable 25 Gram(s) IV Push once  fentaNYL    Injectable 25 MICROGram(s) IV Push every 6 hours PRN  fentaNYL   Infusion. 0.5 MICROgram(s)/kG/Hr IV Continuous <Continuous>  ferrous sulfate Oral Tab/Cap - Peds 325 milliGRAM(s) Oral daily  glucagon  Injectable 1 milliGRAM(s) IntraMuscular once PRN  heparin  Injectable 5000 Unit(s) SubCutaneous every 8 hours  insulin lispro (HumaLOG) corrective regimen sliding scale   SubCutaneous every 6 hours  ipratropium 17 MICROgram(s) HFA Inhaler 1 Puff(s) Inhalation every 6 hours  methylPREDNISolone sodium succinate Injectable 40 milliGRAM(s) IV Push every 6 hours  pantoprazole  Injectable 40 milliGRAM(s) IV Push daily  sodium bicarbonate  Infusion 0.196 mEq/kG/Hr IV Continuous <Continuous>      FAMILY HISTORY:      SOCIAL HISTORY:  Smoking: [ ]Yes [ x]No  ETOH: [ ]Yes [ x]No  Drug Use: [ ]Yes [x ]No   [x ] Single[ ]    T(F): 97.5 (07-15-18 @ 08:00), Max: 98.9 (18 @ 16:00)  HR: 54 (07-15-18 @ 12:00)  BP: 82/60 (07-15-18 @ 12:00)  RR: 20 (07-15-18 @ 12:00)  SpO2: 98% (07-15-18 @ 12:00)  Wt(kg): --    PHYSICAL EXAM:  General: alert, acute distress  Eyes:  anicteric, no conjunctival injection, no discharge  Oropharynx: no lesions or injection ETT in place	  Neck: supple, without adenopathy  Lungs: clear to auscultation  Heart: regular rate and rhythm; no murmur, rubs or gallops  Abdomen: soft, nondistended, nontender, without mass or organomegaly  Skin: no lesions  Extremities: no clubbing, cyanosis, or edema  Neurologic: awake on vent, very uncomfortable appearing  back, no ulcers    LAB RESULTS:                        8.4    16.9  )-----------( 154      ( 15 Jul 2018 05:20 )             27.6     07-15    143  |  110<H>  |  40<H>  ----------------------------<  140<H>  4.7   |  20<L>  |  2.39<H>    Ca    7.4<L>      15 Jul 2018 05:20  Phos  3.3     07-15    TPro  5.2<L>  /  Alb  1.9<L>  /  TBili  0.3  /  DBili  x   /  AST  69<H>  /  ALT  65<H>  /  AlkPhos  94  07-15    LIVER FUNCTIONS - ( 15 Jul 2018 05:20 )  Alb: 1.9 g/dL / Pro: 5.2 g/dL / ALK PHOS: 94 U/L / ALT: 65 U/L DA / AST: 69 U/L / GGT: x           Urinalysis Basic - ( 2018 12:07 )    Color: Yellow / Appearance: Clear / S.010 / pH: x  Gluc: x / Ketone: Negative  / Bili: Negative / Urobili: Negative   Blood: x / Protein: 100 / Nitrite: Negative   Leuk Esterase: Negative / RBC: 5-10 /HPF / WBC 0-2 /HPF   Sq Epi: x / Non Sq Epi: Neg.-Few / Bacteria: Few /HPF        MICROBIOLOGY:  RECENT CULTURES:        RADIOLOGY REVIEWED:  < from: CT Angio Abdomen and Pelvis w/ IV Cont (18 @ 10:22) >  IMPRESSION:    1. No filling defects identified within the segmental pulmonary arterial   tree to indicate pulmonary was them.  2. There is evidence for increased caliber of the distal aortic arch   measuring 3.8 cm. A region of atherosclerotic ulceration is identified   along the medial/inferior aspect of the aortic arch (no prior   contrast-enhanced CT evaluation of the chest is available for   comparison). The caliber of the descending thoracic aorta measures up to   3.8 cm, demonstrating prominent mural plaque, unchanged in size. The   caliber of the aorta at the level of the diaphragmatic hiatus is   unchanged measuring 3.7 cm. Aneurysmal dilatation of the abdominal aorta   is identified, significantly increased in size measuring up to 6.0 cm   with circumferential mural thrombus identified. There isno evidence for   aortic rupture or aortic dissection.  3. There are regions of irregular nodular opacity and increased   interstitial opacity within the right lower lobe lung parenchyma. Regions   of interstitial and airspace opacities are identified throughout the left   upper lobe and left lower lobe with dense left lower lobe consolidation   evident.             < end of copied text >          Impression:  Elderly woman with severe health care associated pneumonia, respiratory and renal failure, required intubation, on vent , on pressors, thick secretions. Suspect regular bacteria but will cover for atypicals like legionella as well given time of year. No other source of infection is overtly apparent on exam or imaging.   Recommendations:  cover with cefepime and doxycycline. she has gotten dosed with vanco but will hold further doses for now, she is likely therapeutic anyway  check blood, sputum, urine cultures  urine legionella ag  rvp  supportive care per ccu team  r/w Dr. Simmons

## 2018-07-15 NOTE — PROGRESS NOTE ADULT - ASSESSMENT
·	DOC, CKD 3: Prerenal azotemia, ATN, Contrast nephropathy, Left atrophic kidney  ·	Respiratory failure, Pneumonia  ·	Aortic aneurysm  ·	Diabetes  ·	Shock    Worsening renal function in pt with solitary functioning kidney. Optimize BP with pressor support. On IVF with bicarb.  On abx. Avoid nephrotxic meds. If no improvement in renal function will likely need RRT in the next 24-48 hours.   Monitor blood sugar levels. Insulin coverage as needed. Will follow electrolytes and renal function trend.

## 2018-07-15 NOTE — PROGRESS NOTE ADULT - ASSESSMENT
Physical Examination:  GENERAL:               Intubated, sedated, on Sedation vacation , pt alert, followed commands  HEENT:                  Pupils equal, reactive to light. Dry  MM, ET tube full of secretions, thinck  PULM:                   Diminished air entry, + significant sputum production, trace Rales, No Rhonchi, diffuse  Wheezing with Prolonged expiration time.  CVS:                      S1, S2, + Murmur  ABD:                       Soft, nondistended, nontender, normoactive bowel sounds,   EXT:                       mild edema, nontender, No Cyanosis or Clubbing   Vascular:                Warm Extremities, Normal Capillary refill, Normal Distal Pulses  SKIN:                     Warm and well perfused, no rashes noted.   NEURO:                 sedated, withdraws to stimuli in all 4 extremetis  PSYC:                      Sedated    Assessment  Septic shock due to b/l PNA- HCAP with Acute hypoxic abd hypercarbic respiratory failure and DOC  Acute on chronic COPD exacerbation.  Severe metabolic acidosis - suspect lactic acidosis.  - awaiting repeat lactic acid  DOC on CKD baseline cr 1.4  AAA enlarging     Plan  Mechanical ventilation to continue  f/u Lactic acid  ID consult  F/u cultures   continue broad spectrum abx  bicarb dirp  renal f/u  awaiting Vascular eval  steroids and bronchodilators to continue  Insulin SS  claudio strict I&O .     PMD:				                   Notified(Date):  Family Updated: 	Son	                                 Date:  7/14      Sedation & Analgesia:	precedex/fent  Diet/Nutrition:		tube feed  GI PPx:			Protonix    DVT Ppx:		  	RISK                                                          Points  	[ ] Previous VTE                                           	3  	[ ] Thrombophilia                                        	2  	[ ] Lower limb paralysis                              	2   	[ ] Current Cancer                                       	2   	[x ] Immobilization > 24 hrs                        	1  	[x ] ICU/CCU stay > 24 hours                       	1  	[x ] Age > 60                                                   	1  		Total:[3 ]      Activity:		     Passive rom/bed turbing            Head of Bed:               35-45 deg  Glycemic Control:        insulin ss    Lines:  CENTRAL LINE: 	[ ] YES [x ] NO	                    LOCATION:   	                       DATE INSERTED:   	                    REMOVE:  [ ] YES [ ] NO    A-LINE:  	                [ ] YES [ x] NO                      LOCATION:   	                       DATE INSERTED: 		            REMOVE:  [ ] YES [ ] NO    CLAUDIO: 		        [x ] YES [ ] NO  		                                       DATE INSERTED:	7/14	            REMOVE:  [ ] YES [ x] NO        Disposition: ICU care    Goals of Care: Full code

## 2018-07-16 DIAGNOSIS — J18.9 PNEUMONIA, UNSPECIFIED ORGANISM: ICD-10-CM

## 2018-07-16 DIAGNOSIS — J96.01 ACUTE RESPIRATORY FAILURE WITH HYPOXIA: ICD-10-CM

## 2018-07-16 DIAGNOSIS — I10 ESSENTIAL (PRIMARY) HYPERTENSION: ICD-10-CM

## 2018-07-16 DIAGNOSIS — A41.9 SEPSIS, UNSPECIFIED ORGANISM: ICD-10-CM

## 2018-07-16 DIAGNOSIS — J44.1 CHRONIC OBSTRUCTIVE PULMONARY DISEASE WITH (ACUTE) EXACERBATION: ICD-10-CM

## 2018-07-16 LAB
ANION GAP SERPL CALC-SCNC: 13 MMOL/L — SIGNIFICANT CHANGE UP (ref 5–17)
BUN SERPL-MCNC: 49 MG/DL — HIGH (ref 7–23)
CALCIUM SERPL-MCNC: 7.7 MG/DL — LOW (ref 8.4–10.5)
CHLORIDE SERPL-SCNC: 108 MMOL/L — SIGNIFICANT CHANGE UP (ref 96–108)
CO2 BLDA-SCNC: 24 MMOL/L — SIGNIFICANT CHANGE UP (ref 22–30)
CO2 SERPL-SCNC: 21 MMOL/L — LOW (ref 22–31)
CREAT SERPL-MCNC: 2.46 MG/DL — HIGH (ref 0.5–1.3)
GLUCOSE BLDC GLUCOMTR-MCNC: 171 MG/DL — HIGH (ref 70–99)
GLUCOSE BLDC GLUCOMTR-MCNC: 189 MG/DL — HIGH (ref 70–99)
GLUCOSE BLDC GLUCOMTR-MCNC: 192 MG/DL — HIGH (ref 70–99)
GLUCOSE BLDC GLUCOMTR-MCNC: 204 MG/DL — HIGH (ref 70–99)
GLUCOSE SERPL-MCNC: 206 MG/DL — HIGH (ref 70–99)
GRAM STN FLD: SIGNIFICANT CHANGE UP
HCT VFR BLD CALC: 29.1 % — LOW (ref 34.5–45)
HGB BLD-MCNC: 9.1 G/DL — LOW (ref 11.5–15.5)
HOROWITZ INDEX BLDA+IHG-RTO: SIGNIFICANT CHANGE UP
LEGIONELLA AG UR QL: NEGATIVE — SIGNIFICANT CHANGE UP
MAGNESIUM SERPL-MCNC: 1.6 MG/DL — SIGNIFICANT CHANGE UP (ref 1.6–2.6)
MCHC RBC-ENTMCNC: 24.8 PG — LOW (ref 27–34)
MCHC RBC-ENTMCNC: 31.3 GM/DL — LOW (ref 32–36)
MCV RBC AUTO: 79.2 FL — LOW (ref 80–100)
PCO2 BLDA: 44 MMHG — SIGNIFICANT CHANGE UP (ref 32–46)
PH BLDA: 7.33 — LOW (ref 7.35–7.45)
PHOSPHATE SERPL-MCNC: 4.4 MG/DL — SIGNIFICANT CHANGE UP (ref 2.5–4.5)
PLATELET # BLD AUTO: 179 K/UL — SIGNIFICANT CHANGE UP (ref 150–400)
PO2 BLDA: 59 MMHG — LOW (ref 74–108)
POTASSIUM SERPL-MCNC: 3.5 MMOL/L — SIGNIFICANT CHANGE UP (ref 3.5–5.3)
POTASSIUM SERPL-SCNC: 3.5 MMOL/L — SIGNIFICANT CHANGE UP (ref 3.5–5.3)
RAPID RVP RESULT: SIGNIFICANT CHANGE UP
RBC # BLD: 3.67 M/UL — LOW (ref 3.8–5.2)
RBC # FLD: 17.8 % — HIGH (ref 10.3–14.5)
SAO2 % BLDA: 86 % — LOW (ref 92–96)
SODIUM SERPL-SCNC: 142 MMOL/L — SIGNIFICANT CHANGE UP (ref 135–145)
SPECIMEN SOURCE: SIGNIFICANT CHANGE UP
WBC # BLD: 17.6 K/UL — HIGH (ref 3.8–10.5)
WBC # FLD AUTO: 17.6 K/UL — HIGH (ref 3.8–10.5)

## 2018-07-16 PROCEDURE — 71045 X-RAY EXAM CHEST 1 VIEW: CPT | Mod: 26

## 2018-07-16 RX ORDER — MAGNESIUM SULFATE 500 MG/ML
1 VIAL (ML) INJECTION
Qty: 0 | Refills: 0 | Status: COMPLETED | OUTPATIENT
Start: 2018-07-16 | End: 2018-07-16

## 2018-07-16 RX ORDER — IPRATROPIUM/ALBUTEROL SULFATE 18-103MCG
3 AEROSOL WITH ADAPTER (GRAM) INHALATION EVERY 6 HOURS
Qty: 0 | Refills: 0 | Status: DISCONTINUED | OUTPATIENT
Start: 2018-07-16 | End: 2018-07-17

## 2018-07-16 RX ORDER — POTASSIUM CHLORIDE 20 MEQ
40 PACKET (EA) ORAL ONCE
Qty: 0 | Refills: 0 | Status: DISCONTINUED | OUTPATIENT
Start: 2018-07-16 | End: 2018-07-16

## 2018-07-16 RX ORDER — POTASSIUM CHLORIDE 20 MEQ
40 PACKET (EA) ORAL ONCE
Qty: 0 | Refills: 0 | Status: COMPLETED | OUTPATIENT
Start: 2018-07-16 | End: 2018-07-16

## 2018-07-16 RX ADMIN — FENTANYL CITRATE 2.87 MICROGRAM(S)/KG/HR: 50 INJECTION INTRAVENOUS at 16:37

## 2018-07-16 RX ADMIN — Medication 110 MILLIGRAM(S): at 05:15

## 2018-07-16 RX ADMIN — ALBUTEROL 2 PUFF(S): 90 AEROSOL, METERED ORAL at 04:41

## 2018-07-16 RX ADMIN — CHLORHEXIDINE GLUCONATE 1 APPLICATION(S): 213 SOLUTION TOPICAL at 05:16

## 2018-07-16 RX ADMIN — ALBUTEROL 2 PUFF(S): 90 AEROSOL, METERED ORAL at 16:35

## 2018-07-16 RX ADMIN — ALBUTEROL 2 PUFF(S): 90 AEROSOL, METERED ORAL at 10:46

## 2018-07-16 RX ADMIN — PANTOPRAZOLE SODIUM 40 MILLIGRAM(S): 20 TABLET, DELAYED RELEASE ORAL at 11:31

## 2018-07-16 RX ADMIN — Medication 100 GRAM(S): at 10:19

## 2018-07-16 RX ADMIN — Medication 40 MILLIEQUIVALENT(S): at 10:19

## 2018-07-16 RX ADMIN — Medication 40 MILLIGRAM(S): at 05:15

## 2018-07-16 RX ADMIN — HEPARIN SODIUM 5000 UNIT(S): 5000 INJECTION INTRAVENOUS; SUBCUTANEOUS at 14:29

## 2018-07-16 RX ADMIN — Medication 1 PUFF(S): at 10:47

## 2018-07-16 RX ADMIN — Medication 1 PUFF(S): at 16:35

## 2018-07-16 RX ADMIN — Medication 325 MILLIGRAM(S): at 11:30

## 2018-07-16 RX ADMIN — Medication 2: at 05:24

## 2018-07-16 RX ADMIN — Medication 1 PUFF(S): at 04:41

## 2018-07-16 RX ADMIN — Medication 40 MILLIGRAM(S): at 23:17

## 2018-07-16 RX ADMIN — Medication 1: at 11:39

## 2018-07-16 RX ADMIN — Medication 110 MILLIGRAM(S): at 18:04

## 2018-07-16 RX ADMIN — Medication 40 MILLIGRAM(S): at 11:29

## 2018-07-16 RX ADMIN — DEXMEDETOMIDINE HYDROCHLORIDE IN 0.9% SODIUM CHLORIDE 4.3 MICROGRAM(S)/KG/HR: 4 INJECTION INTRAVENOUS at 14:30

## 2018-07-16 RX ADMIN — Medication 3 MILLILITER(S): at 22:58

## 2018-07-16 RX ADMIN — DEXMEDETOMIDINE HYDROCHLORIDE IN 0.9% SODIUM CHLORIDE 4.3 MICROGRAM(S)/KG/HR: 4 INJECTION INTRAVENOUS at 05:00

## 2018-07-16 RX ADMIN — DEXMEDETOMIDINE HYDROCHLORIDE IN 0.9% SODIUM CHLORIDE 4.3 MICROGRAM(S)/KG/HR: 4 INJECTION INTRAVENOUS at 21:22

## 2018-07-16 RX ADMIN — HEPARIN SODIUM 5000 UNIT(S): 5000 INJECTION INTRAVENOUS; SUBCUTANEOUS at 05:15

## 2018-07-16 RX ADMIN — CHLORHEXIDINE GLUCONATE 15 MILLILITER(S): 213 SOLUTION TOPICAL at 17:22

## 2018-07-16 RX ADMIN — CHLORHEXIDINE GLUCONATE 15 MILLILITER(S): 213 SOLUTION TOPICAL at 05:15

## 2018-07-16 RX ADMIN — CEFEPIME 100 MILLIGRAM(S): 1 INJECTION, POWDER, FOR SOLUTION INTRAMUSCULAR; INTRAVENOUS at 14:35

## 2018-07-16 RX ADMIN — Medication 100 GRAM(S): at 11:29

## 2018-07-16 RX ADMIN — HEPARIN SODIUM 5000 UNIT(S): 5000 INJECTION INTRAVENOUS; SUBCUTANEOUS at 21:27

## 2018-07-16 RX ADMIN — Medication 40 MILLIGRAM(S): at 17:22

## 2018-07-16 RX ADMIN — Medication 1: at 17:22

## 2018-07-16 NOTE — PROGRESS NOTE ADULT - PROBLEM SELECTOR PLAN 2
Secondary to COPD exacerbation and pneumonia. Continue mechanical ventilation with full support, titrating to maintain SaO2 > 90%. Aggressive suctioning for copious secretions. Keep HOB elevated > 30 degrees. Will wean sedation in the morning and attempt SBT again to assess readiness for extubation.

## 2018-07-16 NOTE — PROGRESS NOTE ADULT - SUBJECTIVE AND OBJECTIVE BOX
Follow-up Critical Care Progress Note  Chief Complaint : Acute respiratory distress      patient is intubated sedated  cpap failed this am  continues to have thick purulent secretions  urine output improved after pressers and lasix trial  cr stable      Allergies :No Known Allergies      PAST MEDICAL & SURGICAL HISTORY:  Thoracic aortic aneurysm without rupture  Hypertension  Asthma  Depression  COPD (chronic obstructive pulmonary disease)  No significant past surgical history      Medications:  MEDICATIONS  (STANDING):  ALBUTerol    90 MICROgram(s) HFA Inhaler 2 Puff(s) Inhalation every 6 hours  cefepime   IVPB 2000 milliGRAM(s) IV Intermittent every 24 hours  chlorhexidine 0.12% Liquid 15 milliLiter(s) Swish and Spit two times a day  chlorhexidine 4% Liquid 1 Application(s) Topical <User Schedule>  dexmedetomidine Infusion 0.3 MICROgram(s)/kG/Hr (4.305 mL/Hr) IV Continuous <Continuous>  dextrose 5%. 1000 milliLiter(s) (50 mL/Hr) IV Continuous <Continuous>  dextrose 50% Injectable 12.5 Gram(s) IV Push once  dextrose 50% Injectable 25 Gram(s) IV Push once  dextrose 50% Injectable 25 Gram(s) IV Push once  doxycycline IVPB      doxycycline IVPB 100 milliGRAM(s) IV Intermittent every 12 hours  fentaNYL   Infusion. 0.5 MICROgram(s)/kG/Hr (2.87 mL/Hr) IV Continuous <Continuous>  ferrous sulfate Oral Tab/Cap - Peds 325 milliGRAM(s) Oral daily  heparin  Injectable 5000 Unit(s) SubCutaneous every 8 hours  insulin lispro (HumaLOG) corrective regimen sliding scale   SubCutaneous every 6 hours  ipratropium 17 MICROgram(s) HFA Inhaler 1 Puff(s) Inhalation every 6 hours  methylPREDNISolone sodium succinate Injectable 40 milliGRAM(s) IV Push every 6 hours  norepinephrine Infusion 0.05 MICROgram(s)/kG/Min (2.691 mL/Hr) IV Continuous <Continuous>  pantoprazole  Injectable 40 milliGRAM(s) IV Push daily  sodium bicarbonate  Infusion 0.196 mEq/kG/Hr (75 mL/Hr) IV Continuous <Continuous>    MEDICATIONS  (PRN):  acetaminophen   Tablet 650 milliGRAM(s) Oral every 6 hours PRN For Temp greater than 38.5 C (101.3 F)  dextrose 40% Gel 15 Gram(s) Oral once PRN Blood Glucose LESS THAN 70 milliGRAM(s)/deciliter  fentaNYL    Injectable 25 MICROGram(s) IV Push every 6 hours PRN Mild Pain (1 - 3)  glucagon  Injectable 1 milliGRAM(s) IntraMuscular once PRN Glucose LESS THAN 70 milligrams/deciliter      LABS:                        9.1    17.6  )-----------( 179      ( 2018 05:02 )             29.1     07-16    142  |  108  |  49<H>  ----------------------------<  206<H>  3.5   |  21<L>  |  2.46<H>    Ca    7.7<L>      2018 05:02  Phos  4.4       Mg     1.6         TPro  5.2<L>  /  Alb  1.9<L>  /  TBili  0.3  /  DBili  x   /  AST  69<H>  /  ALT  65<H>  /  AlkPhos  94  07-15              Urinalysis Basic - ( 2018 12:07 )    Color: Yellow / Appearance: Clear / S.010 / pH: x  Gluc: x / Ketone: Negative  / Bili: Negative / Urobili: Negative   Blood: x / Protein: 100 / Nitrite: Negative   Leuk Esterase: Negative / RBC: 5-10 /HPF / WBC 0-2 /HPF   Sq Epi: x / Non Sq Epi: Neg.-Few / Bacteria: Few /HPF              CULTURES: (if applicable)    Culture - Blood (collected 18 @ 10:10)  Source: .Blood Blood-Peripheral  Preliminary Report (07-15-18 @ 23:01):    No growth to date.    Culture - Blood (collected 18 @ 09:14)  Source: .Blood Blood-Peripheral  Preliminary Report (07-15-18 @ 23:01):    No growth to date.          ABG - ( 2018 09:20 )  pH, Arterial: 7.33  pH, Blood: x     /  pCO2: 44    /  pO2: 59    / HCO3: x     / Base Excess: x     /  SaO2: 86                CAPILLARY BLOOD GLUCOSE      POCT Blood Glucose.: 192 mg/dL (2018 11:34)      RADIOLOGY  CXR:  < from: Xray Chest 1 View- PORTABLE-Routine (18 @ 06:29) >  Impression: Airspace disease in left lower lung field unchanged. Small   left pleural effusion. Findings compatible with pneumonia. Cardiomegaly    < end of copied text >      VITALS:  T(C): 36.2 (18 @ 08:00), Max: 37 (07-15-18 @ 12:00)  T(F): 97.1 (18 @ 08:00), Max: 98.6 (07-15-18 @ 12:00)  HR: 96 (18 @ 10:47) (44 - 113)  BP: 137/81 (18 @ 08:00) (73/57 - 172/101)  BP(mean): 97 (18 @ 08:00) (64 - 145)  ABP: --  ABP(mean): --  RR: 20 (18 @ 08:00) (14 - 22)  SpO2: 91% (18 @ 10:47) (86% - 100%)  CVP(mm Hg): --  CVP(cm H2O): --    Ins and Outs     07-15-18 @ 07:01  -  18 @ 07:00  --------------------------------------------------------  IN: 2133.5 mL / OUT: 1070 mL / NET: 1063.5 mL    18 @ 07:01  -  18 @ 11:47  --------------------------------------------------------  IN: 93.1 mL / OUT: 75 mL / NET: 18.1 mL        Height (cm): 160.02 (18 @ 10:30)  Weight (kg): 57.4 (18 @ 10:30)  BMI (kg/m2): 22.4 (18 @ 10:30)    Device: 840, Mode: AC/ CMV (Assist Control/ Continuous Mandatory Ventilation), RR (machine): 20, RR (patient): 20, TV (machine): 450, TV (patient): 473, FiO2: 30, PEEP: 5, MAP: 10, PIP: 40

## 2018-07-16 NOTE — PROGRESS NOTE ADULT - ASSESSMENT
84 y/o F with a h/o COPD (not on home O2, on chronic prednisone), CKD, HTN, h/o TAA, and depression, from NH, with septic shock, acute mixed hypercapnic/hypoxemic respiratory failure, acute COPD exacerbation, pneumonia, DOC.

## 2018-07-16 NOTE — PROGRESS NOTE ADULT - ASSESSMENT
Echo 6/2018   Summary:   1. Left ventricular ejection fraction, by visual estimation, is 55 to 60%. limited views for technical reasons   2. Normal global left ventricular systolic function.   3. Normal right ventricular size and function.   4. There is no evidence of pericardial effusion.   5. Trace tricuspid regurgitation.   6. Sclerotic aortic valve with normal opening.   7. Dilatation of the descending aorta.   8. Increased relative wall thickness with normal mass index consistent with left ventricular concentric remodeling.       Physical Examination:  GENERAL:               Intubated, sedated, on Sedation vacation , pt alert, followed commands  HEENT:                  Pupils equal, reactive to light. Dry  MM, ET tube full of secretions, thick  + JVD  PULM:                   improved air entry but diminished , + significant sputum production, trace Rales, No Rhonchi, diffuse  Wheezing with Prolonged expiration time.  CVS:                      S1, S2, + Murmur  ABD:                       Soft, nondistended, nontender, normoactive bowel sounds,   EXT:                       mild edema, nontender, No Cyanosis or Clubbing   Vascular:                Warm Extremities, Normal Capillary refill, Normal Distal Pulses  SKIN:                     Warm and well perfused, no rashes noted.   NEURO:                 sedated, withdraws to stimuli in all 4 extremities  PSYC:                      Sedated    Assessment  Septic shock due to b/l PNA- HCAP with Acute hypoxic abd hypercarbic respiratory failure and DOC now on pressers.  Acute on chronic COPD exacerbation.  Severe metabolic acidosis -resovled  DOC on CKD baseline cr 1.4  AAA enlarging     Plan  Mechanical ventilation to continue, daily cpap if possible  ID f/u - continue cefepime/vanco   steroids and bronchodilators to continue  d/c bicarb dirp as pH improved,   renal f/u  Claudio to continue, consider lasix again if urine output < 30 cc/hr  Appreciate Vascular eval - When the patient is clinically stable she should follow up with Dr. Verde (chief of vascular) at [place] for a possible endovascular repair.  Insulin SS  claudio strict I&O .     Family Updated: 	Son	                                 Date:  7/15      Sedation & Analgesia:	precedex/fent  Diet/Nutrition:		tube feed  GI PPx:			Protonix    DVT Ppx:		  	RISK                                                          Points  	[ ] Previous VTE                                           	3  	[ ] Thrombophilia                                        	2  	[ ] Lower limb paralysis                              	2   	[ ] Current Cancer                                       	2   	[x ] Immobilization > 24 hrs                        	1  	[x ] ICU/CCU stay > 24 hours                       	1  	[x ] Age > 60                                                   	1  		Total:[3 ]      Activity:		     Passive rom/bed turbing            Head of Bed:               35-45 deg  Glycemic Control:        insulin ss    Lines:  CENTRAL LINE: 	[x ] YES [ ] NO	                    LOCATION:   	           [facility]            DATE INSERTED:   	   7/15                 REMOVE:  [ ] YES [ x] NO    A-LINE:  	                [ ] YES [ x] NO                      LOCATION:   	                       DATE INSERTED: 		            REMOVE:  [ ] YES [ ] NO    CLAUDIO: 		        [x ] YES [ ] NO  		                                       DATE INSERTED:	7/14	            REMOVE:  [ ] YES [ x] NO        Disposition: ICU care    Goals of Care: Full code

## 2018-07-16 NOTE — PROGRESS NOTE ADULT - SUBJECTIVE AND OBJECTIVE BOX
Patient is a 83y old  Female who presents with a chief complaint of Acute respiratory failure (14 Jul 2018 15:28)      BRIEF HOSPITAL COURSE: 82 y/o F with a h/o COPD (not on home O2, on chronic prednisone), CKD, HTN, h/o TAA, and depression, from NH, admitted on 7/14 with sepsis, COPD exacerbation, pneumonia and acute mixed hypercapnic/hypoxemic respiratory failure requiring intubation. Hospital course complicated by development of septic shock requiring vasopressor therapy, DOC, and failure to wean from ventilator.    Events last 24 hours: Patient failed SBT this morning. Noted to have copious thick yellow secretions. IV vasopressor support has been weaned off today. Patient is currently sedated comfortably on precedex and fentanyl infusions.    PAST MEDICAL & SURGICAL HISTORY:  Thoracic aortic aneurysm without rupture  Hypertension  Asthma  Depression  COPD (chronic obstructive pulmonary disease)  No significant past surgical history      Review of Systems:  Unable to obtain as patient is sedated and intubated.      Medications:  cefepime   IVPB 2000 milliGRAM(s) IV Intermittent every 24 hours  doxycycline IVPB      doxycycline IVPB 100 milliGRAM(s) IV Intermittent every 12 hours  ALBUTerol/ipratropium for Nebulization 3 milliLiter(s) Nebulizer every 6 hours  acetaminophen   Tablet 650 milliGRAM(s) Oral every 6 hours PRN  dexmedetomidine Infusion 0.3 MICROgram(s)/kG/Hr IV Continuous <Continuous>  fentaNYL    Injectable 25 MICROGram(s) IV Push every 6 hours PRN  fentaNYL   Infusion. 0.5 MICROgram(s)/kG/Hr IV Continuous <Continuous>  heparin  Injectable 5000 Unit(s) SubCutaneous every 8 hours  pantoprazole  Injectable 40 milliGRAM(s) IV Push daily  dextrose 40% Gel 15 Gram(s) Oral once PRN  dextrose 50% Injectable 12.5 Gram(s) IV Push once  dextrose 50% Injectable 25 Gram(s) IV Push once  dextrose 50% Injectable 25 Gram(s) IV Push once  glucagon  Injectable 1 milliGRAM(s) IntraMuscular once PRN  insulin lispro (HumaLOG) corrective regimen sliding scale   SubCutaneous every 6 hours  methylPREDNISolone sodium succinate Injectable 40 milliGRAM(s) IV Push every 6 hours  ferrous sulfate Oral Tab/Cap - Peds 325 milliGRAM(s) Oral daily  chlorhexidine 0.12% Liquid 15 milliLiter(s) Swish and Spit two times a day  chlorhexidine 4% Liquid 1 Application(s) Topical <User Schedule>        Mode: AC/ CMV (Assist Control/ Continuous Mandatory Ventilation)  RR (machine): 20  TV (machine): 450  FiO2: 40  PEEP: 5  PIP: 28      ICU Vital Signs Last 24 Hrs  T(C): 36.7 (16 Jul 2018 18:00), Max: 36.7 (16 Jul 2018 18:00)  T(F): 98 (16 Jul 2018 18:00), Max: 98 (16 Jul 2018 18:00)  HR: 50 (16 Jul 2018 20:00) (44 - 113)  BP: 161/96 (16 Jul 2018 20:00) (129/79 - 176/83)  BP(mean): 114 (16 Jul 2018 20:00) (94 - 145)  ABP: --  ABP(mean): --  RR: 20 (16 Jul 2018 20:00) (18 - 22)  SpO2: 100% (16 Jul 2018 20:00) (89% - 100%)      ABG - ( 16 Jul 2018 09:20 )  pH, Arterial: 7.33  pH, Blood: x     /  pCO2: 44    /  pO2: 59    / HCO3: x     / Base Excess: x     /  SaO2: 86                  I&O's Detail    15 Jul 2018 07:01  -  16 Jul 2018 07:00  --------------------------------------------------------  IN:    dexmedetomidine Infusion: 93.8 mL    fentaNYL Infusion.: 121.6 mL    norepinephrine Infusion: 37.9 mL    propofol Infusion: 5.2 mL    sodium bicarbonate  Infusion: 1650 mL    sodium chloride 0.9%: 75 mL    Solution: 150 mL  Total IN: 2133.5 mL    OUT:    Indwelling Catheter - Urethral: 1070 mL  Total OUT: 1070 mL    Total NET: 1063.5 mL      16 Jul 2018 07:01  -  16 Jul 2018 21:00  --------------------------------------------------------  IN:    dexmedetomidine Infusion: 111.9 mL    fentaNYL Infusion.: 196 mL    Nepro: 520 mL    norepinephrine Infusion: 8.4 mL    sodium bicarbonate  Infusion: 300 mL    Solution: 50 mL    Solution: 110 mL  Total IN: 1296.3 mL    OUT:    Indwelling Catheter - Urethral: 885 mL  Total OUT: 885 mL    Total NET: 411.3 mL            LABS:                        9.1    17.6  )-----------( 179      ( 16 Jul 2018 05:02 )             29.1     07-16    142  |  108  |  49<H>  ----------------------------<  206<H>  3.5   |  21<L>  |  2.46<H>    Ca    7.7<L>      16 Jul 2018 05:02  Phos  4.4     07-16  Mg     1.6     07-16    TPro  5.2<L>  /  Alb  1.9<L>  /  TBili  0.3  /  DBili  x   /  AST  69<H>  /  ALT  65<H>  /  AlkPhos  94  07-15          CAPILLARY BLOOD GLUCOSE      POCT Blood Glucose.: 171 mg/dL (16 Jul 2018 17:20)        CULTURES:  Rapid RVP Result: NotDetec (07-15-18 @ 15:43)  Culture Results:   No growth to date. (07-14-18 @ 10:10)  Culture Results:   No growth to date. (07-14-18 @ 09:14)      Physical Examination:    General: No acute distress.  sedated, intubated    HEENT: Pupils equal, reactive to light.  Symmetric.    PULM: diminished bilaterally, (+) purulent significant sputum production    CVS: bradycardic, regular rhythm, no murmurs, rubs, or gallops    ABD: Soft, nondistended, nontender, normoactive bowel sounds, no masses    EXT: No edema, nontender    SKIN: Warm and well perfused, no rashes noted.    NEURO: sedated, moves all extremities spontaneously      RADIOLOGY:     < from: Xray Chest 1 View- PORTABLE-Routine (07.16.18 @ 06:29) >  Findings:     ET tube is in good position at thelevel the aortic arch.   Right jugular CVP line is present in the SVC unchanged. Nasogastric tube   in stomach. Diffuse airspace disease in left lower lung field unchanged.   Right lung is relatively clear. Small left pleural effusion  .   Cardiomegaly unchanged.    The thorax is normal for age.    Impression: Airspace disease in left lower lung field unchanged. Small   left pleural effusion. Findings compatible with pneumonia. Cardiomegaly.        < from: US Renal (07.15.18 @ 11:07) >  IMPRESSION:     Increased renal cortical echogenicity compatible with medical renal   disease. 1.4 cm cyst in the lower pole of the right kidney.    Abdominal aortic aneurysm measuring up to 4.3 cm. Correlate with recent   CT.    Trace perisplenic free fluid, nonspecific.          CRITICAL CARE TIME SPENT: 34 mins  Evaluating/treating patient, reviewing data/labs/imaging, discussing case with multidisciplinary team, discussing plan/goals of care with patient/family. Non-inclusive of procedure time.

## 2018-07-16 NOTE — PROGRESS NOTE ADULT - SUBJECTIVE AND OBJECTIVE BOX
NEPHROLOGY PROGRESS NOTE    Intubated in ICU, failed CPAP earlier, on Levo qtt    EXAM:  T(F): 98 (07-16-18 @ 18:00)  HR: 55 (07-16-18 @ 18:00)  BP: 132/78 (07-16-18 @ 18:00)  RR: 20 (07-16-18 @ 18:00)  SpO2: 100% (07-16-18 @ 18:00)    Lungs - coarse BS bilaterally  Heart - S1S2  Abd - soft, ND, + BS  Extr - + edema         LABS                             9.1    17.6  )-----------( 179      ( 16 Jul 2018 05:02 )             29.1          07-16    142  |  108  |  49<H>  ----------------------------<  206<H>  3.5   |  21<L>  |  2.46<H>    Ca    7.7<L>      16 Jul 2018 05:02  Phos  4.4     07-16  Mg     1.6     07-16    TPro  5.2<L>  /  Alb  1.9<L>  /  TBili  0.3  /  DBili  x   /  AST  69<H>  /  ALT  65<H>  /  AlkPhos  94  07-15    acetaminophen   Tablet 650 milliGRAM(s) Oral every 6 hours PRN  ALBUTerol    90 MICROgram(s) HFA Inhaler 2 Puff(s) Inhalation every 6 hours  cefepime   IVPB 2000 milliGRAM(s) IV Intermittent every 24 hours  chlorhexidine 0.12% Liquid 15 milliLiter(s) Swish and Spit two times a day  chlorhexidine 4% Liquid 1 Application(s) Topical <User Schedule>  dexmedetomidine Infusion 0.3 MICROgram(s)/kG/Hr IV Continuous <Continuous>  dextrose 40% Gel 15 Gram(s) Oral once PRN  dextrose 50% Injectable 12.5 Gram(s) IV Push once  dextrose 50% Injectable 25 Gram(s) IV Push once  dextrose 50% Injectable 25 Gram(s) IV Push once  doxycycline IVPB      doxycycline IVPB 100 milliGRAM(s) IV Intermittent every 12 hours  fentaNYL    Injectable 25 MICROGram(s) IV Push every 6 hours PRN  fentaNYL   Infusion. 0.5 MICROgram(s)/kG/Hr IV Continuous <Continuous>  ferrous sulfate Oral Tab/Cap - Peds 325 milliGRAM(s) Oral daily  glucagon  Injectable 1 milliGRAM(s) IntraMuscular once PRN  heparin  Injectable 5000 Unit(s) SubCutaneous every 8 hours  insulin lispro (HumaLOG) corrective regimen sliding scale   SubCutaneous every 6 hours  ipratropium 17 MICROgram(s) HFA Inhaler 1 Puff(s) Inhalation every 6 hours  methylPREDNISolone sodium succinate Injectable 40 milliGRAM(s) IV Push every 6 hours  norepinephrine Infusion 0.05 MICROgram(s)/kG/Min IV Continuous <Continuous>  pantoprazole  Injectable 40 milliGRAM(s) IV Push daily    A/P:    AAA  B/l PNA  Resp failure  Inutbated, on Levo qtt  Hemodynamic/contrast DOC on CKD III  Atrophic L kidney  Non oliguric  Goal SBP > 90  Avoid nephrotoxins  Agree w/d/c IVF  Diurese PRN only  F/u I/Os, BMP

## 2018-07-17 LAB
ANION GAP SERPL CALC-SCNC: 12 MMOL/L — SIGNIFICANT CHANGE UP (ref 5–17)
BUN SERPL-MCNC: 60 MG/DL — HIGH (ref 7–23)
CALCIUM SERPL-MCNC: 8.4 MG/DL — SIGNIFICANT CHANGE UP (ref 8.4–10.5)
CHLORIDE SERPL-SCNC: 108 MMOL/L — SIGNIFICANT CHANGE UP (ref 96–108)
CO2 BLDA-SCNC: 24 MMOL/L — SIGNIFICANT CHANGE UP (ref 22–30)
CO2 SERPL-SCNC: 24 MMOL/L — SIGNIFICANT CHANGE UP (ref 22–31)
CREAT SERPL-MCNC: 2.32 MG/DL — HIGH (ref 0.5–1.3)
GLUCOSE BLDC GLUCOMTR-MCNC: 185 MG/DL — HIGH (ref 70–99)
GLUCOSE BLDC GLUCOMTR-MCNC: 195 MG/DL — HIGH (ref 70–99)
GLUCOSE SERPL-MCNC: 124 MG/DL — HIGH (ref 70–99)
HCT VFR BLD CALC: 28.6 % — LOW (ref 34.5–45)
HGB BLD-MCNC: 8.8 G/DL — LOW (ref 11.5–15.5)
HOROWITZ INDEX BLDA+IHG-RTO: SIGNIFICANT CHANGE UP
MAGNESIUM SERPL-MCNC: 2.3 MG/DL — SIGNIFICANT CHANGE UP (ref 1.6–2.6)
MCHC RBC-ENTMCNC: 24.4 PG — LOW (ref 27–34)
MCHC RBC-ENTMCNC: 30.9 GM/DL — LOW (ref 32–36)
MCV RBC AUTO: 78.9 FL — LOW (ref 80–100)
PCO2 BLDA: 42 MMHG — SIGNIFICANT CHANGE UP (ref 32–46)
PH BLDA: 7.35 — SIGNIFICANT CHANGE UP (ref 7.35–7.45)
PHOSPHATE SERPL-MCNC: 3.3 MG/DL — SIGNIFICANT CHANGE UP (ref 2.5–4.5)
PLATELET # BLD AUTO: 184 K/UL — SIGNIFICANT CHANGE UP (ref 150–400)
PO2 BLDA: 89 MMHG — SIGNIFICANT CHANGE UP (ref 74–108)
POTASSIUM SERPL-MCNC: 3.7 MMOL/L — SIGNIFICANT CHANGE UP (ref 3.5–5.3)
POTASSIUM SERPL-SCNC: 3.7 MMOL/L — SIGNIFICANT CHANGE UP (ref 3.5–5.3)
RBC # BLD: 3.62 M/UL — LOW (ref 3.8–5.2)
RBC # FLD: 17.7 % — HIGH (ref 10.3–14.5)
SAO2 % BLDA: 95 % — SIGNIFICANT CHANGE UP (ref 92–96)
SODIUM SERPL-SCNC: 144 MMOL/L — SIGNIFICANT CHANGE UP (ref 135–145)
WBC # BLD: 14.4 K/UL — HIGH (ref 3.8–10.5)
WBC # FLD AUTO: 14.4 K/UL — HIGH (ref 3.8–10.5)

## 2018-07-17 PROCEDURE — 71045 X-RAY EXAM CHEST 1 VIEW: CPT | Mod: 26

## 2018-07-17 RX ORDER — QUETIAPINE FUMARATE 200 MG/1
25 TABLET, FILM COATED ORAL EVERY 12 HOURS
Qty: 0 | Refills: 0 | Status: DISCONTINUED | OUTPATIENT
Start: 2018-07-17 | End: 2018-07-19

## 2018-07-17 RX ORDER — AMLODIPINE BESYLATE 2.5 MG/1
5 TABLET ORAL DAILY
Qty: 0 | Refills: 0 | Status: DISCONTINUED | OUTPATIENT
Start: 2018-07-17 | End: 2018-07-18

## 2018-07-17 RX ORDER — HYDRALAZINE HCL 50 MG
10 TABLET ORAL EVERY 6 HOURS
Qty: 0 | Refills: 0 | Status: DISCONTINUED | OUTPATIENT
Start: 2018-07-17 | End: 2018-07-20

## 2018-07-17 RX ORDER — ALBUTEROL 90 UG/1
2 AEROSOL, METERED ORAL EVERY 6 HOURS
Qty: 0 | Refills: 0 | Status: DISCONTINUED | OUTPATIENT
Start: 2018-07-17 | End: 2018-07-21

## 2018-07-17 RX ORDER — IPRATROPIUM BROMIDE 0.2 MG/ML
1 SOLUTION, NON-ORAL INHALATION EVERY 6 HOURS
Qty: 0 | Refills: 0 | Status: DISCONTINUED | OUTPATIENT
Start: 2018-07-17 | End: 2018-07-21

## 2018-07-17 RX ADMIN — Medication 40 MILLIGRAM(S): at 11:47

## 2018-07-17 RX ADMIN — DEXMEDETOMIDINE HYDROCHLORIDE IN 0.9% SODIUM CHLORIDE 4.3 MICROGRAM(S)/KG/HR: 4 INJECTION INTRAVENOUS at 03:46

## 2018-07-17 RX ADMIN — DEXMEDETOMIDINE HYDROCHLORIDE IN 0.9% SODIUM CHLORIDE 4.3 MICROGRAM(S)/KG/HR: 4 INJECTION INTRAVENOUS at 17:54

## 2018-07-17 RX ADMIN — Medication 1: at 00:03

## 2018-07-17 RX ADMIN — DEXMEDETOMIDINE HYDROCHLORIDE IN 0.9% SODIUM CHLORIDE 4.3 MICROGRAM(S)/KG/HR: 4 INJECTION INTRAVENOUS at 08:22

## 2018-07-17 RX ADMIN — Medication 3 MILLILITER(S): at 04:17

## 2018-07-17 RX ADMIN — Medication 40 MILLIGRAM(S): at 17:52

## 2018-07-17 RX ADMIN — CHLORHEXIDINE GLUCONATE 15 MILLILITER(S): 213 SOLUTION TOPICAL at 06:12

## 2018-07-17 RX ADMIN — ALBUTEROL 2 PUFF(S): 90 AEROSOL, METERED ORAL at 09:48

## 2018-07-17 RX ADMIN — AMLODIPINE BESYLATE 5 MILLIGRAM(S): 2.5 TABLET ORAL at 12:46

## 2018-07-17 RX ADMIN — Medication 110 MILLIGRAM(S): at 17:52

## 2018-07-17 RX ADMIN — Medication 1: at 17:53

## 2018-07-17 RX ADMIN — HEPARIN SODIUM 5000 UNIT(S): 5000 INJECTION INTRAVENOUS; SUBCUTANEOUS at 21:43

## 2018-07-17 RX ADMIN — QUETIAPINE FUMARATE 25 MILLIGRAM(S): 200 TABLET, FILM COATED ORAL at 17:53

## 2018-07-17 RX ADMIN — Medication 325 MILLIGRAM(S): at 12:46

## 2018-07-17 RX ADMIN — Medication 40 MILLIGRAM(S): at 06:11

## 2018-07-17 RX ADMIN — CHLORHEXIDINE GLUCONATE 1 APPLICATION(S): 213 SOLUTION TOPICAL at 06:11

## 2018-07-17 RX ADMIN — Medication 1 PUFF(S): at 09:50

## 2018-07-17 RX ADMIN — Medication 1 PUFF(S): at 15:13

## 2018-07-17 RX ADMIN — HEPARIN SODIUM 5000 UNIT(S): 5000 INJECTION INTRAVENOUS; SUBCUTANEOUS at 06:11

## 2018-07-17 RX ADMIN — ALBUTEROL 2 PUFF(S): 90 AEROSOL, METERED ORAL at 22:27

## 2018-07-17 RX ADMIN — Medication 40 MILLIGRAM(S): at 23:30

## 2018-07-17 RX ADMIN — HEPARIN SODIUM 5000 UNIT(S): 5000 INJECTION INTRAVENOUS; SUBCUTANEOUS at 14:34

## 2018-07-17 RX ADMIN — DEXMEDETOMIDINE HYDROCHLORIDE IN 0.9% SODIUM CHLORIDE 4.3 MICROGRAM(S)/KG/HR: 4 INJECTION INTRAVENOUS at 11:15

## 2018-07-17 RX ADMIN — PANTOPRAZOLE SODIUM 40 MILLIGRAM(S): 20 TABLET, DELAYED RELEASE ORAL at 12:46

## 2018-07-17 RX ADMIN — ALBUTEROL 2 PUFF(S): 90 AEROSOL, METERED ORAL at 15:15

## 2018-07-17 RX ADMIN — Medication 110 MILLIGRAM(S): at 06:10

## 2018-07-17 RX ADMIN — CEFEPIME 100 MILLIGRAM(S): 1 INJECTION, POWDER, FOR SOLUTION INTRAMUSCULAR; INTRAVENOUS at 14:34

## 2018-07-17 RX ADMIN — DEXMEDETOMIDINE HYDROCHLORIDE IN 0.9% SODIUM CHLORIDE 4.3 MICROGRAM(S)/KG/HR: 4 INJECTION INTRAVENOUS at 14:34

## 2018-07-17 RX ADMIN — CHLORHEXIDINE GLUCONATE 15 MILLILITER(S): 213 SOLUTION TOPICAL at 17:52

## 2018-07-17 RX ADMIN — Medication 1: at 11:47

## 2018-07-17 RX ADMIN — Medication 10 MILLIGRAM(S): at 01:45

## 2018-07-17 RX ADMIN — Medication 1 PUFF(S): at 22:26

## 2018-07-17 NOTE — PROGRESS NOTE ADULT - PROBLEM SELECTOR PLAN 2
Resloved. Weaned of vasopressors for now. c/w close HD monitoring   Lactic acid cleared.  c/w doxycycline and cefepime   Blood and sputum culture negative for growth. Urine culture pending

## 2018-07-17 NOTE — PROGRESS NOTE ADULT - SUBJECTIVE AND OBJECTIVE BOX
Follow-up Critical Care Progress Note  Chief Complaint : Acute respiratory distress    failed cpap this am  fio2 requirements improving  secretions improving  pt bp now high and off pressers to maintain bp.    urine output remains appropriate.       Allergies :No Known Allergies      PAST MEDICAL & SURGICAL HISTORY:  Thoracic aortic aneurysm without rupture  Hypertension  Asthma  Depression  COPD (chronic obstructive pulmonary disease)  No significant past surgical history      Medications:  MEDICATIONS  (STANDING):  ALBUTerol    90 MICROgram(s) HFA Inhaler 2 Puff(s) Inhalation every 6 hours  amLODIPine   Tablet 5 milliGRAM(s) Oral daily  cefepime   IVPB 2000 milliGRAM(s) IV Intermittent every 24 hours  chlorhexidine 0.12% Liquid 15 milliLiter(s) Swish and Spit two times a day  chlorhexidine 4% Liquid 1 Application(s) Topical <User Schedule>  dexmedetomidine Infusion 0.3 MICROgram(s)/kG/Hr (4.305 mL/Hr) IV Continuous <Continuous>  dextrose 50% Injectable 12.5 Gram(s) IV Push once  dextrose 50% Injectable 25 Gram(s) IV Push once  dextrose 50% Injectable 25 Gram(s) IV Push once  doxycycline IVPB      doxycycline IVPB 100 milliGRAM(s) IV Intermittent every 12 hours  fentaNYL   Infusion. 0.5 MICROgram(s)/kG/Hr (2.87 mL/Hr) IV Continuous <Continuous>  ferrous sulfate Oral Tab/Cap - Peds 325 milliGRAM(s) Oral daily  heparin  Injectable 5000 Unit(s) SubCutaneous every 8 hours  insulin lispro (HumaLOG) corrective regimen sliding scale   SubCutaneous every 6 hours  ipratropium 17 MICROgram(s) HFA Inhaler 1 Puff(s) Inhalation every 6 hours  methylPREDNISolone sodium succinate Injectable 40 milliGRAM(s) IV Push every 6 hours  pantoprazole  Injectable 40 milliGRAM(s) IV Push daily  QUEtiapine 25 milliGRAM(s) Oral every 12 hours    MEDICATIONS  (PRN):  acetaminophen   Tablet 650 milliGRAM(s) Oral every 6 hours PRN For Temp greater than 38.5 C (101.3 F)  dextrose 40% Gel 15 Gram(s) Oral once PRN Blood Glucose LESS THAN 70 milliGRAM(s)/deciliter  fentaNYL    Injectable 25 MICROGram(s) IV Push every 6 hours PRN Mild Pain (1 - 3)  glucagon  Injectable 1 milliGRAM(s) IntraMuscular once PRN Glucose LESS THAN 70 milligrams/deciliter  hydrALAZINE Injectable 10 milliGRAM(s) IV Push every 6 hours PRN SBP > 180      LABS:                        8.8    14.4  )-----------( 184      ( 17 Jul 2018 05:50 )             28.6     07-17    144  |  108  |  60<H>  ----------------------------<  124<H>  3.7   |  24  |  2.32<H>    Ca    8.4      17 Jul 2018 05:50  Phos  3.3     07-17  Mg     2.3     07-17    CULTURES: (if applicable)    Culture - Sputum (collected 07-16-18 @ 05:00)  Source: .Sputum Sputum trap  Gram Stain (07-16-18 @ 12:43):    Moderate polymorphonuclear leukocytes seen per low power field    No Squamous epithelial cells seen per low power field    No organisms seen  Preliminary Report (07-17-18 @ 07:53):    No growth to date.    Culture - Blood (collected 07-14-18 @ 10:10)  Source: .Blood Blood-Peripheral  Preliminary Report (07-15-18 @ 23:01):    No growth to date.    Culture - Blood (collected 07-14-18 @ 09:14)  Source: .Blood Blood-Peripheral  Preliminary Report (07-15-18 @ 23:01):    No growth to date.          ABG - ( 17 Jul 2018 09:25 )  pH, Arterial: 7.35  pH, Blood: x     /  pCO2: 42    /  pO2: 89    / HCO3: x     / Base Excess: x     /  SaO2: 95                CAPILLARY BLOOD GLUCOSE      POCT Blood Glucose.: 195 mg/dL (17 Jul 2018 11:45)      RADIOLOGY  CXR:  CXR grossly unchanged    ECHO:  < from: TTE Echo Complete w/Doppler (06.21.18 @ 08:59) >   1. Left ventricular ejection fraction, by visual estimation, is 55 to 60%. limited views for technical reasons   2. Normal global left ventricular systolic function.   3. Normal right ventricular size and function.   4. There is no evidence of pericardial effusion.   5. Trace tricuspid regurgitation.   6. Sclerotic aortic valve with normal opening.   7. Dilatation of the descending aorta.   8. Increased relative wall thickness with normal mass index consistent with left ventricular concentric remodeling.    < end of copied text >      VITALS:  T(C): 36.7 (07-17-18 @ 12:00), Max: 36.7 (07-16-18 @ 18:00)  T(F): 98 (07-17-18 @ 12:00), Max: 98 (07-16-18 @ 18:00)  HR: 60 (07-17-18 @ 12:00) (46 - 153)  BP: 169/91 (07-17-18 @ 12:00) (132/78 - 208/130)  BP(mean): 114 (07-17-18 @ 12:00) (94 - 169)  ABP: --  ABP(mean): --  RR: 20 (07-17-18 @ 12:00) (19 - 23)  SpO2: 100% (07-17-18 @ 12:00) (86% - 100%)  CVP(mm Hg): --  CVP(cm H2O): --    Ins and Outs     07-16-18 @ 07:01  -  07-17-18 @ 07:00  --------------------------------------------------------  IN: 1760.2 mL / OUT: 1300 mL / NET: 460.2 mL    07-17-18 @ 07:01  -  07-17-18 @ 12:58  --------------------------------------------------------  IN: 429.6 mL / OUT: 215 mL / NET: 214.6 mL            Device: 840, Mode: AC/ CMV (Assist Control/ Continuous Mandatory Ventilation), RR (machine): 20, RR (patient): 21, TV (machine): 0.45, TV (patient): 470, FiO2: 40, PEEP: 5, PIP: 28

## 2018-07-17 NOTE — PROGRESS NOTE ADULT - ASSESSMENT
84 y/o female pmhx HTN, COPD, asthma, TAA, depression admitted on 7/14 from nursing home w/ acute mixed hypercapnic/hypoxic respiratory failure requiring intubation, COPD exacerbation, severe sepsis 2/2 pneumonia, and DOC.

## 2018-07-17 NOTE — PROGRESS NOTE ADULT - SUBJECTIVE AND OBJECTIVE BOX
CC: f/u for pneumonia    Patient reports: she is stable on vent, not ready for extubation.Minimal ET secretions, tolerating enteral feeds.No presser requirements    REVIEW OF SYSTEMS:  All other review of systems negative (Comprehensive ROS)    Antimicrobials Day #  :day 3  cefepime   IVPB 2000 milliGRAM(s) IV Intermittent every 24 hours  doxycycline IVPB      doxycycline IVPB 100 milliGRAM(s) IV Intermittent every 12 hours    Other Medications Reviewed    T(F): 97.4 (07-17-18 @ 16:00), Max: 98 (07-17-18 @ 08:00)  HR: 76 (07-17-18 @ 16:20)  BP: 141/87 (07-17-18 @ 16:20)  RR: 20 (07-17-18 @ 16:20)  SpO2: 100% (07-17-18 @ 16:20)  Wt(kg): --  Fio2 40%, peep 5  PHYSICAL EXAM:  General: sedated, no acute distress  Eyes:  anicteric, no conjunctival injection, no discharge  Oropharynx:ETT	  Neck: supple, without adenopathy  Lungs: scattered ronchi  Heart: regular rate and rhythm; no murmur, rubs or gallops  Abdomen: soft, nondistended, nontender, without mass or organomegaly  Skin: no lesions  Extremities: no clubbing, cyanosis, or edema  Neurologic: sedated on vent, moves all extremities    LAB RESULTS:                        8.8    14.4  )-----------( 184      ( 17 Jul 2018 05:50 )             28.6     07-17    144  |  108  |  60<H>  ----------------------------<  124<H>  3.7   |  24  |  2.32<H>    Ca    8.4      17 Jul 2018 05:50  Phos  3.3     07-17  Mg     2.3     07-17          MICROBIOLOGY:  RECENT CULTURES:  07-16 @ 05:00 .Sputum Sputum trap     No growth to date.    Moderate polymorphonuclear leukocytes seen per low power field  No Squamous epithelial cells seen per low power field  No organisms seen    07-14 @ 10:10 .Blood Blood-Peripheral     No growth to date.      07-14 @ 09:14 .Blood Blood-Peripheral     No growth to date.          RADIOLOGY REVIEWED:    < from: Xray Chest 1 View- PORTABLE-Routine (07.17.18 @ 06:14) >    EXAM:  XR CHEST PORTABLE ROUTINE 1V      PROCEDURE DATE:  07/17/2018        INTERPRETATION:  AP erect chest on July 17, 2018 at 5:50 AM. Patient is   respirator dependent.    Heart is likely enlarged.    Endotracheal tube, nasogastric tube, right jugular line remain.    Significant left lower lobe infiltrate is again noted.    The above findings are similar to July 16.    The present film shows slightly increasing infiltrate pattern at the   right base and the left upper lung field compared to the earlier study.    IMPRESSION: Increasing lung findings    < end of copied text >

## 2018-07-17 NOTE — PROGRESS NOTE ADULT - SUBJECTIVE AND OBJECTIVE BOX
Intubated in ICU, failed CPAP earlier    Vital Signs Last 24 Hrs  T(C): 36.7 (07-17-18 @ 08:00), Max: 36.7 (07-16-18 @ 18:00)  T(F): 98 (07-17-18 @ 08:00), Max: 98 (07-16-18 @ 18:00)  HR: 56 (07-17-18 @ 09:00) (46 - 153)  BP: 169/102 (07-17-18 @ 09:00) (132/78 - 208/130)  BP(mean): 123 (07-17-18 @ 09:00) (94 - 169)  RR: 20 (07-17-18 @ 09:00) (20 - 23)  SpO2: 99% (07-17-18 @ 09:51) (86% - 100%)    Lungs - coarse BS bilaterally  Heart - S1S2  Abd - soft, ND, + BS  Extr - + edema                        8.8    14.4  )-----------( 184      ( 17 Jul 2018 05:50 )             28.6     17 Jul 2018 05:50    144    |  108    |  60     ----------------------------<  124    3.7     |  24     |  2.32     Ca    8.4        17 Jul 2018 05:50  Phos  3.3       17 Jul 2018 05:50  Mg     2.3       17 Jul 2018 05:50    acetaminophen   Tablet 650 milliGRAM(s) Oral every 6 hours PRN  ALBUTerol    90 MICROgram(s) HFA Inhaler 2 Puff(s) Inhalation every 6 hours  amLODIPine   Tablet 5 milliGRAM(s) Oral daily  cefepime   IVPB 2000 milliGRAM(s) IV Intermittent every 24 hours  chlorhexidine 0.12% Liquid 15 milliLiter(s) Swish and Spit two times a day  chlorhexidine 4% Liquid 1 Application(s) Topical <User Schedule>  dexmedetomidine Infusion 0.3 MICROgram(s)/kG/Hr IV Continuous <Continuous>  dextrose 40% Gel 15 Gram(s) Oral once PRN  dextrose 50% Injectable 12.5 Gram(s) IV Push once  dextrose 50% Injectable 25 Gram(s) IV Push once  dextrose 50% Injectable 25 Gram(s) IV Push once  doxycycline IVPB      doxycycline IVPB 100 milliGRAM(s) IV Intermittent every 12 hours  fentaNYL    Injectable 25 MICROGram(s) IV Push every 6 hours PRN  fentaNYL   Infusion. 0.5 MICROgram(s)/kG/Hr IV Continuous <Continuous>  ferrous sulfate Oral Tab/Cap - Peds 325 milliGRAM(s) Oral daily  glucagon  Injectable 1 milliGRAM(s) IntraMuscular once PRN  heparin  Injectable 5000 Unit(s) SubCutaneous every 8 hours  hydrALAZINE Injectable 10 milliGRAM(s) IV Push every 6 hours PRN  insulin lispro (HumaLOG) corrective regimen sliding scale   SubCutaneous every 6 hours  ipratropium 17 MICROgram(s) HFA Inhaler 1 Puff(s) Inhalation every 6 hours  methylPREDNISolone sodium succinate Injectable 40 milliGRAM(s) IV Push every 6 hours  pantoprazole  Injectable 40 milliGRAM(s) IV Push daily  QUEtiapine 25 milliGRAM(s) Oral every 12 hours    A/P:    AAA  B/l PNA  Resp failure  Intubated, on vent  Hemodynamic/contrast DOC on CKD III  Atrophic L kidney  Non oliguric  Avoid nephrotoxins  Diurese PRN   F/u I/Os, BMP

## 2018-07-17 NOTE — PROGRESS NOTE ADULT - ASSESSMENT
Acute respiratory failure in an SNF patient  History of HTN and COPD  Known thoracic aneurysm  Blood and sputum culture negative  CKD being followed by renal, stable  Legionella urine antigen negative  Echo without gross cardiac dysfunction  Suggest:  1.Continue present regimen  2.Supportive care  3.Wean per critical care  4.Condition guarded

## 2018-07-17 NOTE — PROGRESS NOTE ADULT - SUBJECTIVE AND OBJECTIVE BOX
Patient is a 83y old  Female who presents with a chief complaint of Acute respiratory failure (14 Jul 2018 15:28)      BRIEF HOSPITAL COURSE: 82 y/o female pmhx HTN, COPD, asthma, TAA, depression admitted on 7/14 from nursing home w/ acute mixed hypercapnic/hypoxic respiratory failure requiring intubation, COPD exacerbation, severe sepsis 2/2 pneumonia, and DOC.     Events last 24 hours: Remains sedated on Precedex and Fentanyl. Weaned off vasopressors. Failed SBT this am however FiO2 requirements have decreased. Hypertensive today requiring hydralazine PRN and restart of select home medications.       PAST MEDICAL & SURGICAL HISTORY:  Thoracic aortic aneurysm without rupture  Hypertension  Asthma  Depression  COPD (chronic obstructive pulmonary disease)  No significant past surgical history      Review of Systems: Unable to obtain due to intubation and sedation       Medications:  cefepime   IVPB 2000 milliGRAM(s) IV Intermittent every 24 hours  doxycycline IVPB      doxycycline IVPB 100 milliGRAM(s) IV Intermittent every 12 hours  amLODIPine   Tablet 5 milliGRAM(s) Oral daily  hydrALAZINE Injectable 10 milliGRAM(s) IV Push every 6 hours PRN  ALBUTerol    90 MICROgram(s) HFA Inhaler 2 Puff(s) Inhalation every 6 hours  ipratropium 17 MICROgram(s) HFA Inhaler 1 Puff(s) Inhalation every 6 hours  acetaminophen   Tablet 650 milliGRAM(s) Oral every 6 hours PRN  dexmedetomidine Infusion 0.3 MICROgram(s)/kG/Hr IV Continuous <Continuous>  fentaNYL    Injectable 25 MICROGram(s) IV Push every 6 hours PRN  fentaNYL   Infusion. 0.5 MICROgram(s)/kG/Hr IV Continuous <Continuous>  QUEtiapine 25 milliGRAM(s) Oral every 12 hours  heparin  Injectable 5000 Unit(s) SubCutaneous every 8 hours  pantoprazole  Injectable 40 milliGRAM(s) IV Push daily  dextrose 40% Gel 15 Gram(s) Oral once PRN  dextrose 50% Injectable 12.5 Gram(s) IV Push once  dextrose 50% Injectable 25 Gram(s) IV Push once  dextrose 50% Injectable 25 Gram(s) IV Push once  glucagon  Injectable 1 milliGRAM(s) IntraMuscular once PRN  insulin lispro (HumaLOG) corrective regimen sliding scale   SubCutaneous every 6 hours  methylPREDNISolone sodium succinate Injectable 40 milliGRAM(s) IV Push every 6 hours  ferrous sulfate Oral Tab/Cap - Peds 325 milliGRAM(s) Oral daily  chlorhexidine 0.12% Liquid 15 milliLiter(s) Swish and Spit two times a day  chlorhexidine 4% Liquid 1 Application(s) Topical <User Schedule>        Mode: AC/ CMV (Assist Control/ Continuous Mandatory Ventilation)  RR (machine): 20  TV (machine): 450  FiO2: 40  PEEP: 5  PIP: 26      ICU Vital Signs Last 24 Hrs  T(C): 36.3 (17 Jul 2018 16:00), Max: 36.7 (17 Jul 2018 08:00)  T(F): 97.4 (17 Jul 2018 16:00), Max: 98 (17 Jul 2018 08:00)  HR: 79 (17 Jul 2018 20:00) (46 - 153)  BP: 171/106 (17 Jul 2018 20:00) (137/81 - 208/130)  BP(mean): 124 (17 Jul 2018 20:00) (99 - 169)  RR: 20 (17 Jul 2018 20:00) (19 - 24)  SpO2: 100% (17 Jul 2018 20:06) (86% - 100%)      ABG - ( 17 Jul 2018 09:25 )  pH, Arterial: 7.35  pH, Blood: x     /  pCO2: 42    /  pO2: 89    / HCO3: x     / Base Excess: x     /  SaO2: 95                  I&O's Detail    16 Jul 2018 07:01  -  17 Jul 2018 07:00  --------------------------------------------------------  IN:    dexmedetomidine Infusion: 201 mL    fentaNYL Infusion.: 350.8 mL    Nepro: 640 mL    norepinephrine Infusion: 8.4 mL    sodium bicarbonate  Infusion: 300 mL    Solution: 50 mL    Solution: 210 mL  Total IN: 1760.2 mL    OUT:    Indwelling Catheter - Urethral: 1300 mL  Total OUT: 1300 mL    Total NET: 460.2 mL      17 Jul 2018 07:01  -  17 Jul 2018 20:57  --------------------------------------------------------  IN:    dexmedetomidine Infusion: 172.4 mL    fentaNYL Infusion.: 223.6 mL    Nepro: 480 mL  Total IN: 876 mL    OUT:    Indwelling Catheter - Urethral: 610 mL  Total OUT: 610 mL    Total NET: 266 mL            LABS:                        8.8    14.4  )-----------( 184      ( 17 Jul 2018 05:50 )             28.6     07-17    144  |  108  |  60<H>  ----------------------------<  124<H>  3.7   |  24  |  2.32<H>    Ca    8.4      17 Jul 2018 05:50  Phos  3.3     07-17  Mg     2.3     07-17            CAPILLARY BLOOD GLUCOSE      POCT Blood Glucose.: 185 mg/dL (17 Jul 2018 17:51)        CULTURES:  Culture Results:   No growth to date. (07-16-18 @ 05:00)  Rapid RVP Result: NotDetec (07-15-18 @ 15:43)  Culture Results:   No growth to date. (07-14-18 @ 10:10)  Culture Results:   No growth to date. (07-14-18 @ 09:14)      Physical Examination:    General: Intubated and sedated.     HEENT: Pupils equal, reactive to light.  Symmetric. Sluggish     PULM:  Diminished breath sounds b/l. diffuse mild rales. No wheeze/rhonchi. + Sputum production.     CVS: + S1/S2. NSR. no murmurs. No JVD      ABD: Soft, nondistended, nontender, normoactive bowel sounds, no masses    EXT: No edema, nontender    SKIN: Warm and well perfused, no rashes noted.    NEURO: Intubated and sedated. Does not follow commands. Will withdraw to painful stimuli in all extremities.     RADIOLOGY: < from: Xray Chest 1 View- PORTABLE-Routine (07.17.18 @ 06:14) >  EXAM:  XR CHEST PORTABLE ROUTINE 1V      PROCEDURE DATE:  07/17/2018        INTERPRETATION:  AP erect chest on July 17, 2018 at 5:50 AM. Patient is   respirator dependent.    Heart is likely enlarged.    Endotracheal tube, nasogastric tube, right jugular line remain.    Significant left lower lobe infiltrate is again noted.    The above findings are similar to July 16.    The present film shows slightly increasing infiltrate pattern at the   right base and the left upper lung field compared to the earlier study.    IMPRESSION: Increasing lung findings.    < end of copied text >      CRITICAL CARE TIME SPENT: 33 min  Evaluating/treating patient, reviewing data/labs/imaging, discussing case with multidisciplinary team, discussing plan/goals of care with patient/family. Non-inclusive of procedure time.

## 2018-07-17 NOTE — PROGRESS NOTE ADULT - ASSESSMENT
Echo 6/2018   Summary:   1. Left ventricular ejection fraction, by visual estimation, is 55 to 60%. limited views for technical reasons   2. Normal global left ventricular systolic function.   3. Normal right ventricular size and function.   4. There is no evidence of pericardial effusion.   5. Trace tricuspid regurgitation.   6. Sclerotic aortic valve with normal opening.   7. Dilatation of the descending aorta.   8. Increased relative wall thickness with normal mass index consistent with left ventricular concentric remodeling.       Physical Examination:  GENERAL:              Intubated, sedated, on Sedation vacation , pt alert, followed commands  HEENT:                  Pupils equal, reactive to light. Dry  MM, ET tube full of secretions, thick  + JVD  PULM:                    improved air entry but diminished , + significant sputum production, trace Rales, No Rhonchi, b/l mod Wheezing with Prolonged expiration time.  CVS:                      S1, S2, + Murmur  ABD:                       Soft, nondistended, nontender, normoactive bowel sounds,   EXT:                       mild edema, nontender, No Cyanosis or Clubbing   Vascular:                Warm Extremities, Normal Capillary refill, Normal Distal Pulses  SKIN:                     Warm and well perfused, no rashes noted.   NEURO:                 sedated, withdraws to stimuli in all 4 extremities  PSYC:                      Sedated    Assessment  Septic shock due to b/l PNA- HCAP with Acute hypoxic abd hypercarbic respiratory failure and DOC now off pressers.  Acute on chronic COPD exacerbation.  Severe metabolic acidosis -resovled  DOC on CKD baseline cr 1.4 - polyuric phase of ATN  AAA enlarging     Plan  Mechanical ventilation to continue, daily cpap if possible  ID f/u - continue cefepime/doxy  steroids and bronchodilators to continue   wean steroids in am, if continue to improve  d/C IVF  today pt hypertensive, start norvasc   renal monitoring pt  Claudio to continue, consider lasix again if urine output < 30 cc/hr  Appreciate Vascular eval - When the patient is clinically stable she should follow up with Dr. Verde (chief of vascular) at [place] for a possible endovascular repair.  Insulin SS  claudio strict I&O .     Family Updated: 	Son	                                 Date:  7/15      Sedation & Analgesia:	precedex/fent  Diet/Nutrition:		tube feed  GI PPx:			Protonix    DVT Ppx:		  	RISK                                                          Points  	[ ] Previous VTE                                           	3  	[ ] Thrombophilia                                        	2  	[ ] Lower limb paralysis                              	2   	[ ] Current Cancer                                       	2   	[x ] Immobilization > 24 hrs                        	1  	[x ] ICU/CCU stay > 24 hours                       	1  	[x ] Age > 60                                                   	1  		Total:[3 ]      Activity:		     Passive rom/bed turbing            Head of Bed:               35-45 deg  Glycemic Control:        insulin ss    Lines:  CENTRAL LINE: 	[x ] YES [ ] NO	                    LOCATION:   	           [facility]            DATE INSERTED:   	   7/15                 REMOVE:  [ ] YES [ x] NO    A-LINE:  	                [ ] YES [ x] NO                      LOCATION:   	                       DATE INSERTED: 		            REMOVE:  [ ] YES [ ] NO    CLAUDIO: 		        [x ] YES [ ] NO  		                                       DATE INSERTED:	7/14	            REMOVE:  [ ] YES [ x] NO        Disposition: ICU care    Goals of Care: Full code

## 2018-07-18 LAB
ANION GAP SERPL CALC-SCNC: 9 MMOL/L — SIGNIFICANT CHANGE UP (ref 5–17)
BUN SERPL-MCNC: 63 MG/DL — HIGH (ref 7–23)
CALCIUM SERPL-MCNC: 8.7 MG/DL — SIGNIFICANT CHANGE UP (ref 8.4–10.5)
CHLORIDE SERPL-SCNC: 108 MMOL/L — SIGNIFICANT CHANGE UP (ref 96–108)
CO2 BLDA-SCNC: 24 MMOL/L — SIGNIFICANT CHANGE UP (ref 22–30)
CO2 SERPL-SCNC: 27 MMOL/L — SIGNIFICANT CHANGE UP (ref 22–31)
CREAT SERPL-MCNC: 2.25 MG/DL — HIGH (ref 0.5–1.3)
CULTURE RESULTS: NO GROWTH — SIGNIFICANT CHANGE UP
GAS PNL BLDA: SIGNIFICANT CHANGE UP
GLUCOSE BLDC GLUCOMTR-MCNC: 160 MG/DL — HIGH (ref 70–99)
GLUCOSE BLDC GLUCOMTR-MCNC: 163 MG/DL — HIGH (ref 70–99)
GLUCOSE BLDC GLUCOMTR-MCNC: 189 MG/DL — HIGH (ref 70–99)
GLUCOSE BLDC GLUCOMTR-MCNC: 208 MG/DL — HIGH (ref 70–99)
GLUCOSE SERPL-MCNC: 210 MG/DL — HIGH (ref 70–99)
HCT VFR BLD CALC: 31.4 % — LOW (ref 34.5–45)
HGB BLD-MCNC: 9.4 G/DL — LOW (ref 11.5–15.5)
HOROWITZ INDEX BLDA+IHG-RTO: SIGNIFICANT CHANGE UP
MAGNESIUM SERPL-MCNC: 2.1 MG/DL — SIGNIFICANT CHANGE UP (ref 1.6–2.6)
MCHC RBC-ENTMCNC: 24.1 PG — LOW (ref 27–34)
MCHC RBC-ENTMCNC: 30 GM/DL — LOW (ref 32–36)
MCV RBC AUTO: 80.4 FL — SIGNIFICANT CHANGE UP (ref 80–100)
PCO2 BLDA: 42 MMHG — SIGNIFICANT CHANGE UP (ref 32–46)
PH BLDA: 7.35 — SIGNIFICANT CHANGE UP (ref 7.35–7.45)
PHOSPHATE SERPL-MCNC: 4.5 MG/DL — SIGNIFICANT CHANGE UP (ref 2.5–4.5)
PLATELET # BLD AUTO: 220 K/UL — SIGNIFICANT CHANGE UP (ref 150–400)
PO2 BLDA: 127 MMHG — HIGH (ref 74–108)
POTASSIUM SERPL-MCNC: 3.5 MMOL/L — SIGNIFICANT CHANGE UP (ref 3.5–5.3)
POTASSIUM SERPL-SCNC: 3.5 MMOL/L — SIGNIFICANT CHANGE UP (ref 3.5–5.3)
RBC # BLD: 3.91 M/UL — SIGNIFICANT CHANGE UP (ref 3.8–5.2)
RBC # FLD: 18 % — HIGH (ref 10.3–14.5)
SAO2 % BLDA: 98 % — HIGH (ref 92–96)
SODIUM SERPL-SCNC: 144 MMOL/L — SIGNIFICANT CHANGE UP (ref 135–145)
SPECIMEN SOURCE: SIGNIFICANT CHANGE UP
WBC # BLD: 17.5 K/UL — HIGH (ref 3.8–10.5)
WBC # FLD AUTO: 17.5 K/UL — HIGH (ref 3.8–10.5)

## 2018-07-18 PROCEDURE — 71045 X-RAY EXAM CHEST 1 VIEW: CPT | Mod: 26,76

## 2018-07-18 RX ORDER — PROPOFOL 10 MG/ML
0.5 INJECTION, EMULSION INTRAVENOUS
Qty: 1000 | Refills: 0 | Status: DISCONTINUED | OUTPATIENT
Start: 2018-07-18 | End: 2018-07-21

## 2018-07-18 RX ORDER — METOPROLOL TARTRATE 50 MG
5 TABLET ORAL ONCE
Qty: 0 | Refills: 0 | Status: DISCONTINUED | OUTPATIENT
Start: 2018-07-18 | End: 2018-07-19

## 2018-07-18 RX ORDER — AMLODIPINE BESYLATE 2.5 MG/1
5 TABLET ORAL ONCE
Qty: 0 | Refills: 0 | Status: COMPLETED | OUTPATIENT
Start: 2018-07-18 | End: 2018-07-18

## 2018-07-18 RX ORDER — AMLODIPINE BESYLATE 2.5 MG/1
10 TABLET ORAL DAILY
Qty: 0 | Refills: 0 | Status: DISCONTINUED | OUTPATIENT
Start: 2018-07-19 | End: 2018-07-20

## 2018-07-18 RX ADMIN — Medication 40 MILLIGRAM(S): at 13:32

## 2018-07-18 RX ADMIN — Medication 1 PUFF(S): at 22:26

## 2018-07-18 RX ADMIN — Medication 1: at 00:14

## 2018-07-18 RX ADMIN — Medication 1 DROP(S): at 18:05

## 2018-07-18 RX ADMIN — ALBUTEROL 2 PUFF(S): 90 AEROSOL, METERED ORAL at 15:00

## 2018-07-18 RX ADMIN — CHLORHEXIDINE GLUCONATE 15 MILLILITER(S): 213 SOLUTION TOPICAL at 05:42

## 2018-07-18 RX ADMIN — Medication 110 MILLIGRAM(S): at 18:04

## 2018-07-18 RX ADMIN — Medication 1: at 12:20

## 2018-07-18 RX ADMIN — ALBUTEROL 2 PUFF(S): 90 AEROSOL, METERED ORAL at 03:59

## 2018-07-18 RX ADMIN — Medication 1 PUFF(S): at 03:59

## 2018-07-18 RX ADMIN — Medication 2: at 06:09

## 2018-07-18 RX ADMIN — Medication 1 DROP(S): at 13:32

## 2018-07-18 RX ADMIN — AMLODIPINE BESYLATE 5 MILLIGRAM(S): 2.5 TABLET ORAL at 05:41

## 2018-07-18 RX ADMIN — DEXMEDETOMIDINE HYDROCHLORIDE IN 0.9% SODIUM CHLORIDE 4.3 MICROGRAM(S)/KG/HR: 4 INJECTION INTRAVENOUS at 00:20

## 2018-07-18 RX ADMIN — ALBUTEROL 2 PUFF(S): 90 AEROSOL, METERED ORAL at 09:01

## 2018-07-18 RX ADMIN — Medication 40 MILLIGRAM(S): at 05:41

## 2018-07-18 RX ADMIN — PANTOPRAZOLE SODIUM 40 MILLIGRAM(S): 20 TABLET, DELAYED RELEASE ORAL at 12:15

## 2018-07-18 RX ADMIN — QUETIAPINE FUMARATE 25 MILLIGRAM(S): 200 TABLET, FILM COATED ORAL at 18:08

## 2018-07-18 RX ADMIN — PROPOFOL 0.17 MICROGRAM(S)/KG/MIN: 10 INJECTION, EMULSION INTRAVENOUS at 18:05

## 2018-07-18 RX ADMIN — HEPARIN SODIUM 5000 UNIT(S): 5000 INJECTION INTRAVENOUS; SUBCUTANEOUS at 05:41

## 2018-07-18 RX ADMIN — PROPOFOL 0.17 MICROGRAM(S)/KG/MIN: 10 INJECTION, EMULSION INTRAVENOUS at 11:06

## 2018-07-18 RX ADMIN — Medication 325 MILLIGRAM(S): at 12:15

## 2018-07-18 RX ADMIN — Medication 1 DROP(S): at 23:26

## 2018-07-18 RX ADMIN — Medication 1 PUFF(S): at 15:00

## 2018-07-18 RX ADMIN — CEFEPIME 100 MILLIGRAM(S): 1 INJECTION, POWDER, FOR SOLUTION INTRAMUSCULAR; INTRAVENOUS at 15:53

## 2018-07-18 RX ADMIN — HEPARIN SODIUM 5000 UNIT(S): 5000 INJECTION INTRAVENOUS; SUBCUTANEOUS at 23:22

## 2018-07-18 RX ADMIN — ALBUTEROL 2 PUFF(S): 90 AEROSOL, METERED ORAL at 22:26

## 2018-07-18 RX ADMIN — CHLORHEXIDINE GLUCONATE 15 MILLILITER(S): 213 SOLUTION TOPICAL at 18:04

## 2018-07-18 RX ADMIN — Medication 1: at 18:04

## 2018-07-18 RX ADMIN — QUETIAPINE FUMARATE 25 MILLIGRAM(S): 200 TABLET, FILM COATED ORAL at 05:41

## 2018-07-18 RX ADMIN — CHLORHEXIDINE GLUCONATE 1 APPLICATION(S): 213 SOLUTION TOPICAL at 05:42

## 2018-07-18 RX ADMIN — Medication 1 PUFF(S): at 09:01

## 2018-07-18 RX ADMIN — FENTANYL CITRATE 2.87 MICROGRAM(S)/KG/HR: 50 INJECTION INTRAVENOUS at 16:33

## 2018-07-18 RX ADMIN — Medication 110 MILLIGRAM(S): at 05:42

## 2018-07-18 RX ADMIN — HEPARIN SODIUM 5000 UNIT(S): 5000 INJECTION INTRAVENOUS; SUBCUTANEOUS at 13:32

## 2018-07-18 RX ADMIN — AMLODIPINE BESYLATE 5 MILLIGRAM(S): 2.5 TABLET ORAL at 12:31

## 2018-07-18 NOTE — PROGRESS NOTE ADULT - ASSESSMENT
Acute respiratory failure in an SNF patient  History of HTN and COPD  Known thoracic aneurysm  Tx for pneumonia- remains empiric  Blood and sputum culture negative  Legionella urine antigen negative  Afebrile  CXR unchanged b/l infiltrates  CKD stable  Echo without gross cardiac dysfunction    Suggest:  Continue present regimen- Cefepime + Doxy  Supportive care  Follow temps and CBC/diff   Weaning as outlined- plans for tomorrow noted   Condition remains guarded  D/w nurse

## 2018-07-18 NOTE — PROGRESS NOTE ADULT - SUBJECTIVE AND OBJECTIVE BOX
CC: f/u for pneumonia    Patient remains sedated on Vent- AC 20, no weaning, minimal secretions    REVIEW OF SYSTEMS:  Not provided on Vent    Antimicrobials Day # 4  cefepime   IVPB 2000 milliGRAM(s) IV Intermittent every 24 hours  doxycycline IVPB      doxycycline IVPB 100 milliGRAM(s) IV Intermittent every 12 hours    Other Medications Reviewed    ICU Vital Signs Last 24 Hrs  T(F): 98.8 (18 Jul 2018 12:00), Max: 98.8 (18 Jul 2018 12:00)  HR: 50 (18 Jul 2018 12:00) (47 - 145)  BP: 129/80 (18 Jul 2018 12:00) (104/68 - 198/139)  BP(mean): 95 (18 Jul 2018 12:00) (76 - 152)  RR: 20 (18 Jul 2018 12:00) (20 - 24)  SpO2: 100% (18 Jul 2018 12:00) (99% - 100%)    PHYSICAL EXAM:  General: sedated, no acute distress  Eyes:  anicteric, no conjunctival injection, no discharge  Oropharynx: ETT	  Neck: RIJ site clean  Lungs: scattered rhonchi  Heart: regular rate and rhythm; no murmur, rubs or gallops  Abdomen: soft, nondistended, nontender, without mass or organomegaly  Bryson  Skin: no lesions  Extremities: no edema  Neurologic: sedated on Vent    LAB RESULTS:                        9.4    17.5  )-----------( 220      ( 18 Jul 2018 06:20 )             31.4   07-18    144  |  108  |  63<H>  ----------------------------<  210<H>  3.5   |  27  |  2.25<H>    Ca    8.7      18 Jul 2018 06:20  Phos  4.5     07-18  Mg     2.1     07-18      MICROBIOLOGY:  RECENT CULTURES:  07-16 @ 05:00 .Sputum Sputum trap     No growth to date.    Moderate polymorphonuclear leukocytes seen per low power field  No Squamous epithelial cells seen per low power field  No organisms seen    07-14 @ 10:10 .Blood Blood-Peripheral     No growth to date.      07-14 @ 09:14 .Blood Blood-Peripheral     No growth to date.    RADIOLOGY REVIEWED:  Xray Chest 1 View-PORTABLE IMMEDIATE (07.18.18 @ 08:59) >  No significant interval change in bilateral infiltrates, left   greater than right. Small left pleural effusion suggested. No change in   position of indwelling ETT or right IJ CVC.

## 2018-07-18 NOTE — PROGRESS NOTE ADULT - SUBJECTIVE AND OBJECTIVE BOX
Patient is a 83y old  Female who presents with a chief complaint of Acute respiratory failure (14 Jul 2018 15:28)      BRIEF HOSPITAL COURSE: 82 y/o female pmhx HTN, COPD, asthma, TAA, depression admitted on 7/14 from nursing home w/ acute mixed hypercapnic/hypoxic respiratory failure requiring intubation, COPD exacerbation, severe sepsis 2/2 pneumonia, and DOC. Failing SBT's.     Events last 24 hours: Patient requiring high doses of sedative agents     PAST MEDICAL & SURGICAL HISTORY:  Thoracic aortic aneurysm without rupture  Hypertension  Asthma  Depression  COPD (chronic obstructive pulmonary disease)  No significant past surgical history      Review of Systems:  CONSTITUTIONAL: No fever, chills, or fatigue  EYES: No eye pain, visual disturbances, or discharge  ENMT:  No difficulty hearing, tinnitus, vertigo; No sinus or throat pain  NECK: No pain or stiffness  RESPIRATORY: No cough, wheezing, chills or hemoptysis; No shortness of breath  CARDIOVASCULAR: No chest pain, palpitations, dizziness, or leg swelling  GASTROINTESTINAL: No abdominal or epigastric pain. No nausea, vomiting, or hematemesis; No diarrhea or constipation. No melena or hematochezia.  GENITOURINARY: No dysuria, frequency, hematuria, or incontinence  NEUROLOGICAL: No headaches, memory loss, loss of strength, numbness, or tremors  SKIN: No itching, burning, rashes, or lesions   MUSCULOSKELETAL: No joint pain or swelling; No muscle, back, or extremity pain  PSYCHIATRIC: No depression, anxiety, mood swings, or difficulty sleeping      Medications:  cefepime   IVPB 2000 milliGRAM(s) IV Intermittent every 24 hours  doxycycline IVPB      doxycycline IVPB 100 milliGRAM(s) IV Intermittent every 12 hours    amLODIPine   Tablet 10 milliGRAM(s) Oral daily  hydrALAZINE Injectable 10 milliGRAM(s) IV Push every 6 hours PRN    ALBUTerol    90 MICROgram(s) HFA Inhaler 2 Puff(s) Inhalation every 6 hours  ipratropium 17 MICROgram(s) HFA Inhaler 1 Puff(s) Inhalation every 6 hours    acetaminophen   Tablet 650 milliGRAM(s) Oral every 6 hours PRN  fentaNYL    Injectable 25 MICROGram(s) IV Push every 6 hours PRN  fentaNYL   Infusion. 0.5 MICROgram(s)/kG/Hr IV Continuous <Continuous>  propofol Infusion 0.5 MICROgram(s)/kG/Min IV Continuous <Continuous>  QUEtiapine 25 milliGRAM(s) Oral every 12 hours      heparin  Injectable 5000 Unit(s) SubCutaneous every 8 hours    pantoprazole  Injectable 40 milliGRAM(s) IV Push daily      dextrose 40% Gel 15 Gram(s) Oral once PRN  dextrose 50% Injectable 12.5 Gram(s) IV Push once  dextrose 50% Injectable 25 Gram(s) IV Push once  dextrose 50% Injectable 25 Gram(s) IV Push once  glucagon  Injectable 1 milliGRAM(s) IntraMuscular once PRN  insulin lispro (HumaLOG) corrective regimen sliding scale   SubCutaneous every 6 hours  methylPREDNISolone sodium succinate Injectable 40 milliGRAM(s) IV Push every 8 hours    ferrous sulfate Oral Tab/Cap - Peds 325 milliGRAM(s) Oral daily      artificial  tears Solution 1 Drop(s) Both EYES every 6 hours  chlorhexidine 0.12% Liquid 15 milliLiter(s) Swish and Spit two times a day  chlorhexidine 4% Liquid 1 Application(s) Topical <User Schedule>        Mode: AC/ CMV (Assist Control/ Continuous Mandatory Ventilation)  RR (machine): 20  TV (machine): 450  FiO2: 40  PEEP: 5  PIP: 24      ICU Vital Signs Last 24 Hrs  T(C): 36.7 (19 Jul 2018 00:00), Max: 37.1 (18 Jul 2018 12:00)  T(F): 98 (19 Jul 2018 00:00), Max: 98.8 (18 Jul 2018 12:00)  HR: 55 (19 Jul 2018 04:00) (47 - 142)  BP: 145/79 (19 Jul 2018 04:00) (92/57 - 211/108)  BP(mean): 100 (19 Jul 2018 04:00) (68 - 152)  ABP: --  ABP(mean): --  RR: 20 (19 Jul 2018 04:00) (19 - 23)  SpO2: 100% (19 Jul 2018 04:48) (98% - 100%)      ABG - ( 18 Jul 2018 14:05 )  pH, Arterial: 7.35  pH, Blood: x     /  pCO2: 42    /  pO2: 127   / HCO3: x     / Base Excess: x     /  SaO2: 98                  I&O's Detail    17 Jul 2018 07:01  -  18 Jul 2018 07:00  --------------------------------------------------------  IN:    dexmedetomidine Infusion: 416.4 mL    fentaNYL Infusion.: 491.8 mL    Nepro: 720 mL    Solution: 100 mL  Total IN: 1728.2 mL    OUT:    Indwelling Catheter - Urethral: 1135 mL  Total OUT: 1135 mL    Total NET: 593.2 mL      18 Jul 2018 07:01  -  19 Jul 2018 05:19  --------------------------------------------------------  IN:    dexmedetomidine Infusion: 43.2 mL    fentaNYL Infusion.: 454.2 mL    Nepro: 640 mL    propofol Infusion: 186.3 mL    Solution: 50 mL    Solution: 100 mL  Total IN: 1473.7 mL    OUT:    Indwelling Catheter - Urethral: 1035 mL  Total OUT: 1035 mL    Total NET: 438.7 mL            LABS:                        9.4    17.5  )-----------( 220      ( 18 Jul 2018 06:20 )             31.4     07-18    144  |  108  |  63<H>  ----------------------------<  210<H>  3.5   |  27  |  2.25<H>    Ca    8.7      18 Jul 2018 06:20  Phos  4.5     07-18  Mg     2.1     07-18            CAPILLARY BLOOD GLUCOSE      POCT Blood Glucose.: 160 mg/dL (18 Jul 2018 23:57)        CULTURES:  Culture Results:   No growth (07-16-18 @ 05:00)  Rapid RVP Result: NotDetec (07-15-18 @ 15:43)  Culture Results:   No growth to date. (07-14-18 @ 10:10)  Culture Results:   No growth to date. (07-14-18 @ 09:14)      Physical Examination:    General: No acute distress.  Alert, oriented, interactive, nonfocal    HEENT: Pupils equal, reactive to light.  Symmetric.    PULM: Clear to auscultation bilaterally, no significant sputum production    CVS: Regular rate and rhythm, no murmurs, rubs, or gallops    ABD: Soft, nondistended, nontender, normoactive bowel sounds, no masses    EXT: No edema, nontender    SKIN: Warm and well perfused, no rashes noted.    NEURO: A&Ox3, strength 5/5 all extremities, cranial nerves grossly intact, no focal deficits    RADIOLOGY: ***    CRITICAL CARE TIME SPENT: ***  Evaluating/treating patient, reviewing data/labs/imaging, discussing case with multidisciplinary team, discussing plan/goals of care with patient/family. Non-inclusive of procedure time. Patient is a 83y old  Female who presents with a chief complaint of Acute respiratory failure (14 Jul 2018 15:28)      BRIEF HOSPITAL COURSE: 82 y/o female pmhx HTN, COPD, asthma, TAA, depression admitted on 7/14 from nursing home w/ acute mixed hypercapnic/hypoxic respiratory failure requiring intubation, COPD exacerbation, severe sepsis 2/2 pneumonia, and DOC. Failing SBT's.     Events last 24 hours: Patient requiring high doses of sedative agents. Fentanyl and Propofol. Has periods of agitation associated w/ tachycardia and hypertension.   Failed SBT yesterday Precedex d/c due to bradycardia      PAST MEDICAL & SURGICAL HISTORY:  Thoracic aortic aneurysm without rupture  Hypertension  Asthma  Depression  COPD (chronic obstructive pulmonary disease)  No significant past surgical history      Review of Systems: Unable to obtain due to patients condition      Medications:  cefepime   IVPB 2000 milliGRAM(s) IV Intermittent every 24 hours  doxycycline IVPB      doxycycline IVPB 100 milliGRAM(s) IV Intermittent every 12 hours  amLODIPine   Tablet 10 milliGRAM(s) Oral daily  hydrALAZINE Injectable 10 milliGRAM(s) IV Push every 6 hours PRN  ALBUTerol    90 MICROgram(s) HFA Inhaler 2 Puff(s) Inhalation every 6 hours  ipratropium 17 MICROgram(s) HFA Inhaler 1 Puff(s) Inhalation every 6 hours  acetaminophen   Tablet 650 milliGRAM(s) Oral every 6 hours PRN  fentaNYL    Injectable 25 MICROGram(s) IV Push every 6 hours PRN  fentaNYL   Infusion. 0.5 MICROgram(s)/kG/Hr IV Continuous <Continuous>  propofol Infusion 0.5 MICROgram(s)/kG/Min IV Continuous <Continuous>  QUEtiapine 25 milliGRAM(s) Oral every 12 hours  heparin  Injectable 5000 Unit(s) SubCutaneous every 8 hours  pantoprazole  Injectable 40 milliGRAM(s) IV Push daily  dextrose 40% Gel 15 Gram(s) Oral once PRN  dextrose 50% Injectable 12.5 Gram(s) IV Push once  dextrose 50% Injectable 25 Gram(s) IV Push once  dextrose 50% Injectable 25 Gram(s) IV Push once  glucagon  Injectable 1 milliGRAM(s) IntraMuscular once PRN  insulin lispro (HumaLOG) corrective regimen sliding scale   SubCutaneous every 6 hours  methylPREDNISolone sodium succinate Injectable 40 milliGRAM(s) IV Push every 8 hours  ferrous sulfate Oral Tab/Cap - Peds 325 milliGRAM(s) Oral daily  artificial  tears Solution 1 Drop(s) Both EYES every 6 hours  chlorhexidine 0.12% Liquid 15 milliLiter(s) Swish and Spit two times a day  chlorhexidine 4% Liquid 1 Application(s) Topical <User Schedule>        Mode: AC/ CMV (Assist Control/ Continuous Mandatory Ventilation)  RR (machine): 20  TV (machine): 450  FiO2: 40  PEEP: 5  PIP: 24      ICU Vital Signs Last 24 Hrs  T(C): 36.7 (19 Jul 2018 00:00), Max: 37.1 (18 Jul 2018 12:00)  T(F): 98 (19 Jul 2018 00:00), Max: 98.8 (18 Jul 2018 12:00)  HR: 55 (19 Jul 2018 04:00) (47 - 142)  BP: 145/79 (19 Jul 2018 04:00) (92/57 - 211/108)  BP(mean): 100 (19 Jul 2018 04:00) (68 - 152)  RR: 20 (19 Jul 2018 04:00) (19 - 23)  SpO2: 100% (19 Jul 2018 04:48) (98% - 100%)      ABG - ( 18 Jul 2018 14:05 )  pH, Arterial: 7.35  pH, Blood: x     /  pCO2: 42    /  pO2: 127   / HCO3: x     / Base Excess: x     /  SaO2: 98                  I&O's Detail    17 Jul 2018 07:01  -  18 Jul 2018 07:00  --------------------------------------------------------  IN:    dexmedetomidine Infusion: 416.4 mL    fentaNYL Infusion.: 491.8 mL    Nepro: 720 mL    Solution: 100 mL  Total IN: 1728.2 mL    OUT:    Indwelling Catheter - Urethral: 1135 mL  Total OUT: 1135 mL    Total NET: 593.2 mL      18 Jul 2018 07:01  -  19 Jul 2018 05:19  --------------------------------------------------------  IN:    dexmedetomidine Infusion: 43.2 mL    fentaNYL Infusion.: 454.2 mL    Nepro: 640 mL    propofol Infusion: 186.3 mL    Solution: 50 mL    Solution: 100 mL  Total IN: 1473.7 mL    OUT:    Indwelling Catheter - Urethral: 1035 mL  Total OUT: 1035 mL    Total NET: 438.7 mL            LABS:                        9.4    17.5  )-----------( 220      ( 18 Jul 2018 06:20 )             31.4     07-18    144  |  108  |  63<H>  ----------------------------<  210<H>  3.5   |  27  |  2.25<H>    Ca    8.7      18 Jul 2018 06:20  Phos  4.5     07-18  Mg     2.1     07-18            CAPILLARY BLOOD GLUCOSE      POCT Blood Glucose.: 160 mg/dL (18 Jul 2018 23:57)        CULTURES:  Culture Results:   No growth (07-16-18 @ 05:00)  Rapid RVP Result: NotDetec (07-15-18 @ 15:43)  Culture Results:   No growth to date. (07-14-18 @ 10:10)  Culture Results:   No growth to date. (07-14-18 @ 09:14)      Physical Examination:    General: Intubated and sedated. Agitated at times     HEENT: Pupils equal, reactive to light.  Symmetric.    PULM:  Diminshed breath sounds b/l.  No wheeze/rales/rhonchi. No sputum production     CV: + S1/S2. NSR. no murmurs. No JVD      ABD: Soft, nondistended, nontender, normoactive bowel sounds, no masses    EXT: No edema, nontender    SKIN: Warm and well perfused, no rashes noted.    NEURO: Awake. Will follow select commands. Able to move all extremities.      RADIOLOGY: < from: Xray Chest 1 View-PORTABLE IMMEDIATE (07.18.18 @ 08:59) >    EXAM:  XR CHEST PORTABLE IMMED 1V      PROCEDURE DATE:  07/18/2018        INTERPRETATION:  INDICATION: Pneumonia; follow-up assessment    PRIORS: July 18, 2018    VIEWS: Portable AP radiography of the chest performed.    FINDINGS: Since prior evaluation there is no significant interval change   in position of the indwelling ETT or right IJ CVC. The distal aspect of   the indwelling NGT is not included on the radiographic evaluation. Heart   size appears within normal limits. No superior mediastinal widening is   identified. There is no significant interval change in bilateral   infiltrates, left greater than right. A small left pleural effusion is   suggested. There is no evidence for pneumothorax. No mediastinal shift is   noted. Degenerative change of the bilateral shoulder regions noted.    IMPRESSION: No significant interval change in bilateral infiltrates, left   greater than right. Small left pleural effusion suggested. No change in   position of indwelling ETT or right IJ CVC.    < end of copied text >      CRITICAL CARE TIME SPENT: 33 min   Evaluating/treating patient, reviewing data/labs/imaging, discussing case with multidisciplinary team, discussing plan/goals of care with patient/family. Non-inclusive of procedure time.

## 2018-07-18 NOTE — PROGRESS NOTE ADULT - ASSESSMENT
Echo 6/2018   Summary:   1. Left ventricular ejection fraction, by visual estimation, is 55 to 60%. limited views for technical reasons   2. Normal global left ventricular systolic function.   3. Normal right ventricular size and function.   4. There is no evidence of pericardial effusion.   5. Trace tricuspid regurgitation.   6. Sclerotic aortic valve with normal opening.   7. Dilatation of the descending aorta.   8. Increased relative wall thickness with normal mass index consistent with left ventricular concentric remodeling.       Physical Examination:  GENERAL:              Intubated, sedated, on Sedation vacation , pt alert, followed commands  HEENT:                  Pupils equal, reactive to light. Dry  MM, ET tube full of secretions, thick  + JVD  PULM:                    improved air entry but diminished , + significant sputum production, trace Rales, No Rhonchi, b/l mild Wheezing with Prolonged expiration time.  CVS:                      S1, S2, + Murmur  ABD:                       Soft, nondistended, nontender, normoactive bowel sounds,   EXT:                       mild edema, nontender, No Cyanosis or Clubbing   Vascular:                Warm Extremities, Normal Capillary refill, Normal Distal Pulses  SKIN:                     Warm and well perfused, no rashes noted.   NEURO:                 sedated, withdraws to stimuli in all 4 extremities  PSYC:                      Sedated    Assessment  Septic shock due to b/l PNA- HCAP with Acute hypoxic abd hypercarbic respiratory failure and DOC now off pressers.   Acute on chronic COPD exacerbation.  Severe metabolic acidosis resolved  DOC on CKD baseline cr 1.4 - polyuric phase of ATN stable  AAA enlarging vascular workup when stable    Plan  Mechanical ventilation to continue,   ID f/u - continue cefepime/doxy  steroids to taper  continue bronchodilatos  increase norvasc  renal monitoring pt  Bryson to continue, consider Lasix again if urine output < 30 cc/hr  strict I&O .     Appreciate Vascular eval - When the patient is clinically stable she should follow up with Dr. Verde (chief of vascular) at [facility] for a possible endovascular repair.  Insulin SS      Family Updated: 	Son	                                 Date:  7/18  Sedation & Analgesia:	precedex/fent add propfool d/c precedex  Diet/Nutrition:		tube feed  GI PPx:			Protonix    DVT Ppx:		Hep SQ        Activity:		     Passive rom/bed turbing            Head of Bed:               35-45 deg  Glycemic Control:        insulin ss    Lines:  CENTRAL LINE: 	[x ] YES [ ] NO	                    LOCATION:   	           [facility]            DATE INSERTED:   	   7/15                 REMOVE:  [ ] YES [ x] NO    plan to remove in am  A-LINE:  	                [ ] YES [ x] NO                      LOCATION:   	                       DATE INSERTED: 		            REMOVE:  [ ] YES [ ] NO    KATHARINA: 		        [x ] YES [ ] NO  		                                       DATE INSERTED:	7/14	            REMOVE:  [ ] YES [ x] NO  plan to remove in am      Disposition: ICU care    Goals of Care: Full code

## 2018-07-18 NOTE — PROGRESS NOTE ADULT - SUBJECTIVE AND OBJECTIVE BOX
Intubated in ICU    Vital Signs Last 24 Hrs  T(C): 36.7 (07-18-18 @ 19:00), Max: 37.1 (07-18-18 @ 12:00)  T(F): 98 (07-18-18 @ 19:00), Max: 98.8 (07-18-18 @ 12:00)  HR: 57 (07-18-18 @ 20:00) (47 - 142)  BP: 95/57 (07-18-18 @ 20:00) (92/57 - 211/108)  BP(mean): 70 (07-18-18 @ 20:00) (68 - 152)  RR: 20 (07-18-18 @ 20:00) (19 - 23)  SpO2: 100% (07-18-18 @ 20:00) (98% - 100%)    Lungs - coarse BS bilaterally  Heart - S1S2  Abd - soft, ND, + BS  Extr - no edema                                9.4    17.5  )-----------( 220      ( 18 Jul 2018 06:20 )             31.4     18 Jul 2018 06:20    144    |  108    |  63     ----------------------------<  210    3.5     |  27     |  2.25     Ca    8.7        18 Jul 2018 06:20  Phos  4.5       18 Jul 2018 06:20  Mg     2.1       18 Jul 2018 06:20    acetaminophen   Tablet 650 milliGRAM(s) Oral every 6 hours PRN  ALBUTerol    90 MICROgram(s) HFA Inhaler 2 Puff(s) Inhalation every 6 hours  amLODIPine   Tablet 10 milliGRAM(s) Oral daily  artificial  tears Solution 1 Drop(s) Both EYES every 6 hours  cefepime   IVPB 2000 milliGRAM(s) IV Intermittent every 24 hours  chlorhexidine 0.12% Liquid 15 milliLiter(s) Swish and Spit two times a day  chlorhexidine 4% Liquid 1 Application(s) Topical <User Schedule>  dextrose 40% Gel 15 Gram(s) Oral once PRN  dextrose 50% Injectable 12.5 Gram(s) IV Push once  dextrose 50% Injectable 25 Gram(s) IV Push once  dextrose 50% Injectable 25 Gram(s) IV Push once  doxycycline IVPB      doxycycline IVPB 100 milliGRAM(s) IV Intermittent every 12 hours  fentaNYL    Injectable 25 MICROGram(s) IV Push every 6 hours PRN  fentaNYL   Infusion. 0.5 MICROgram(s)/kG/Hr IV Continuous <Continuous>  ferrous sulfate Oral Tab/Cap - Peds 325 milliGRAM(s) Oral daily  glucagon  Injectable 1 milliGRAM(s) IntraMuscular once PRN  heparin  Injectable 5000 Unit(s) SubCutaneous every 8 hours  hydrALAZINE Injectable 10 milliGRAM(s) IV Push every 6 hours PRN  insulin lispro (HumaLOG) corrective regimen sliding scale   SubCutaneous every 6 hours  ipratropium 17 MICROgram(s) HFA Inhaler 1 Puff(s) Inhalation every 6 hours  methylPREDNISolone sodium succinate Injectable 40 milliGRAM(s) IV Push every 8 hours  pantoprazole  Injectable 40 milliGRAM(s) IV Push daily  propofol Infusion 0.5 MICROgram(s)/kG/Min IV Continuous <Continuous>  QUEtiapine 25 milliGRAM(s) Oral every 12 hours    A/P:    AAA  B/l PNA  Resp failure  Intubated, on vent  Hemodynamic/contrast DOC on CKD III  Atrophic L kidney  Non oliguric  Renal function is slowly improving  Avoid nephrotoxins  Diurese PRN   F/u I/Os, BMP

## 2018-07-18 NOTE — PROGRESS NOTE ADULT - SUBJECTIVE AND OBJECTIVE BOX
Follow-up Critical Care Progress Note  Chief Complaint : Acute respiratory distress    patient failed cpap  secretions now scant  agitated hypertensive off sedation  wheezing much improved    Allergies :No Known Allergies      PAST MEDICAL & SURGICAL HISTORY:  Thoracic aortic aneurysm without rupture  Hypertension  Asthma  Depression  COPD (chronic obstructive pulmonary disease)  No significant past surgical history      Medications:  MEDICATIONS  (STANDING):  ALBUTerol    90 MICROgram(s) HFA Inhaler 2 Puff(s) Inhalation every 6 hours  artificial  tears Solution 1 Drop(s) Both EYES every 6 hours  cefepime   IVPB 2000 milliGRAM(s) IV Intermittent every 24 hours  chlorhexidine 0.12% Liquid 15 milliLiter(s) Swish and Spit two times a day  chlorhexidine 4% Liquid 1 Application(s) Topical <User Schedule>  dextrose 50% Injectable 12.5 Gram(s) IV Push once  dextrose 50% Injectable 25 Gram(s) IV Push once  dextrose 50% Injectable 25 Gram(s) IV Push once  doxycycline IVPB      doxycycline IVPB 100 milliGRAM(s) IV Intermittent every 12 hours  fentaNYL   Infusion. 0.5 MICROgram(s)/kG/Hr (2.87 mL/Hr) IV Continuous <Continuous>  ferrous sulfate Oral Tab/Cap - Peds 325 milliGRAM(s) Oral daily  heparin  Injectable 5000 Unit(s) SubCutaneous every 8 hours  insulin lispro (HumaLOG) corrective regimen sliding scale   SubCutaneous every 6 hours  ipratropium 17 MICROgram(s) HFA Inhaler 1 Puff(s) Inhalation every 6 hours  methylPREDNISolone sodium succinate Injectable 40 milliGRAM(s) IV Push every 8 hours  pantoprazole  Injectable 40 milliGRAM(s) IV Push daily  propofol Infusion 0.5 MICROgram(s)/kG/Min (0.172 mL/Hr) IV Continuous <Continuous>  QUEtiapine 25 milliGRAM(s) Oral every 12 hours    MEDICATIONS  (PRN):  acetaminophen   Tablet 650 milliGRAM(s) Oral every 6 hours PRN For Temp greater than 38.5 C (101.3 F)  dextrose 40% Gel 15 Gram(s) Oral once PRN Blood Glucose LESS THAN 70 milliGRAM(s)/deciliter  fentaNYL    Injectable 25 MICROGram(s) IV Push every 6 hours PRN Mild Pain (1 - 3)  glucagon  Injectable 1 milliGRAM(s) IntraMuscular once PRN Glucose LESS THAN 70 milligrams/deciliter  hydrALAZINE Injectable 10 milliGRAM(s) IV Push every 6 hours PRN SBP > 180      LABS:                        9.4    17.5  )-----------( 220      ( 18 Jul 2018 06:20 )             31.4     07-18    144  |  108  |  63<H>  ----------------------------<  210<H>  3.5   |  27  |  2.25<H>    Ca    8.7      18 Jul 2018 06:20  Phos  4.5     07-18  Mg     2.1     07-18      CULTURES: (if applicable)    Culture - Sputum (collected 07-16-18 @ 05:00)  Source: .Sputum Sputum trap  Gram Stain (07-16-18 @ 12:43):    Moderate polymorphonuclear leukocytes seen per low power field    No Squamous epithelial cells seen per low power field    No organisms seen  Final Report (07-18-18 @ 08:06):    No growth    Culture - Blood (collected 07-14-18 @ 10:10)  Source: .Blood Blood-Peripheral  Preliminary Report (07-15-18 @ 23:01):    No growth to date.    Culture - Blood (collected 07-14-18 @ 09:14)  Source: .Blood Blood-Peripheral  Preliminary Report (07-15-18 @ 23:01):    No growth to date.          ABG - ( 17 Jul 2018 09:25 )  pH, Arterial: 7.35  pH, Blood: x     /  pCO2: 42    /  pO2: 89    / HCO3: x     / Base Excess: x     /  SaO2: 95                CAPILLARY BLOOD GLUCOSE      POCT Blood Glucose.: 208 mg/dL (18 Jul 2018 05:35)      RADIOLOGY  CXR:  < from: Xray Chest 1 View-PORTABLE IMMEDIATE (07.18.18 @ 08:59) >  IMPRESSION: No significant interval change in bilateral infiltrates, left   greater than right. Small left pleural effusion suggested. No change in   position of indwelling ETT or right IJ CVC.    < end of copied text >    VITALS:  T(C): 36.3 (07-18-18 @ 08:30), Max: 36.8 (07-17-18 @ 21:00)  T(F): 97.4 (07-18-18 @ 08:30), Max: 98.2 (07-17-18 @ 21:00)  HR: 48 (07-18-18 @ 10:00) (47 - 145)  BP: 152/87 (07-18-18 @ 10:00) (104/68 - 198/139)  BP(mean): 107 (07-18-18 @ 10:00) (76 - 156)  ABP: --  ABP(mean): --  RR: 20 (07-18-18 @ 10:00) (20 - 24)  SpO2: 100% (07-18-18 @ 10:00) (97% - 100%)  CVP(mm Hg): --  CVP(cm H2O): --    Ins and Outs     07-17-18 @ 07:01  -  07-18-18 @ 07:00  --------------------------------------------------------  IN: 1728.2 mL / OUT: 1135 mL / NET: 593.2 mL    07-18-18 @ 07:01  -  07-18-18 @ 10:15  --------------------------------------------------------  IN: 161.6 mL / OUT: 120 mL / NET: 41.6 mL            Device: 840, Mode: AC/ CMV (Assist Control/ Continuous Mandatory Ventilation), RR (machine): 20, RR (patient): 20, TV (machine): 450, TV (patient): 467, FiO2: 40, PEEP: 5, PIP: 30

## 2018-07-19 LAB
ANION GAP SERPL CALC-SCNC: 10 MMOL/L — SIGNIFICANT CHANGE UP (ref 5–17)
ANION GAP SERPL CALC-SCNC: 12 MMOL/L — SIGNIFICANT CHANGE UP (ref 5–17)
BUN SERPL-MCNC: 69 MG/DL — HIGH (ref 7–23)
BUN SERPL-MCNC: 71 MG/DL — HIGH (ref 7–23)
CALCIUM SERPL-MCNC: 8.8 MG/DL — SIGNIFICANT CHANGE UP (ref 8.4–10.5)
CALCIUM SERPL-MCNC: 8.9 MG/DL — SIGNIFICANT CHANGE UP (ref 8.4–10.5)
CHLORIDE SERPL-SCNC: 109 MMOL/L — HIGH (ref 96–108)
CHLORIDE SERPL-SCNC: 111 MMOL/L — HIGH (ref 96–108)
CO2 SERPL-SCNC: 25 MMOL/L — SIGNIFICANT CHANGE UP (ref 22–31)
CO2 SERPL-SCNC: 26 MMOL/L — SIGNIFICANT CHANGE UP (ref 22–31)
CREAT SERPL-MCNC: 2.28 MG/DL — HIGH (ref 0.5–1.3)
CREAT SERPL-MCNC: 2.46 MG/DL — HIGH (ref 0.5–1.3)
CULTURE RESULTS: SIGNIFICANT CHANGE UP
CULTURE RESULTS: SIGNIFICANT CHANGE UP
GLUCOSE BLDC GLUCOMTR-MCNC: 132 MG/DL — HIGH (ref 70–99)
GLUCOSE BLDC GLUCOMTR-MCNC: 150 MG/DL — HIGH (ref 70–99)
GLUCOSE BLDC GLUCOMTR-MCNC: 178 MG/DL — HIGH (ref 70–99)
GLUCOSE BLDC GLUCOMTR-MCNC: 296 MG/DL — HIGH (ref 70–99)
GLUCOSE SERPL-MCNC: 140 MG/DL — HIGH (ref 70–99)
GLUCOSE SERPL-MCNC: 151 MG/DL — HIGH (ref 70–99)
HCT VFR BLD CALC: 25 % — LOW (ref 34.5–45)
HCT VFR BLD CALC: 27.8 % — LOW (ref 34.5–45)
HGB BLD-MCNC: 7.6 G/DL — LOW (ref 11.5–15.5)
HGB BLD-MCNC: 8.4 G/DL — LOW (ref 11.5–15.5)
MAGNESIUM SERPL-MCNC: 2.1 MG/DL — SIGNIFICANT CHANGE UP (ref 1.6–2.6)
MCHC RBC-ENTMCNC: 24.2 PG — LOW (ref 27–34)
MCHC RBC-ENTMCNC: 24.3 PG — LOW (ref 27–34)
MCHC RBC-ENTMCNC: 30.3 GM/DL — LOW (ref 32–36)
MCHC RBC-ENTMCNC: 30.3 GM/DL — LOW (ref 32–36)
MCV RBC AUTO: 79.8 FL — LOW (ref 80–100)
MCV RBC AUTO: 80 FL — SIGNIFICANT CHANGE UP (ref 80–100)
PHOSPHATE SERPL-MCNC: 4.6 MG/DL — HIGH (ref 2.5–4.5)
PLATELET # BLD AUTO: 174 K/UL — SIGNIFICANT CHANGE UP (ref 150–400)
PLATELET # BLD AUTO: 228 K/UL — SIGNIFICANT CHANGE UP (ref 150–400)
POTASSIUM SERPL-MCNC: 3.2 MMOL/L — LOW (ref 3.5–5.3)
POTASSIUM SERPL-MCNC: 3.4 MMOL/L — LOW (ref 3.5–5.3)
POTASSIUM SERPL-SCNC: 3.2 MMOL/L — LOW (ref 3.5–5.3)
POTASSIUM SERPL-SCNC: 3.4 MMOL/L — LOW (ref 3.5–5.3)
RBC # BLD: 3.13 M/UL — LOW (ref 3.8–5.2)
RBC # BLD: 3.47 M/UL — LOW (ref 3.8–5.2)
RBC # FLD: 17.9 % — HIGH (ref 10.3–14.5)
RBC # FLD: 18.3 % — HIGH (ref 10.3–14.5)
SODIUM SERPL-SCNC: 146 MMOL/L — HIGH (ref 135–145)
SODIUM SERPL-SCNC: 147 MMOL/L — HIGH (ref 135–145)
SPECIMEN SOURCE: SIGNIFICANT CHANGE UP
SPECIMEN SOURCE: SIGNIFICANT CHANGE UP
WBC # BLD: 12.1 K/UL — HIGH (ref 3.8–10.5)
WBC # BLD: 19.2 K/UL — HIGH (ref 3.8–10.5)
WBC # FLD AUTO: 12.1 K/UL — HIGH (ref 3.8–10.5)
WBC # FLD AUTO: 19.2 K/UL — HIGH (ref 3.8–10.5)

## 2018-07-19 RX ORDER — QUETIAPINE FUMARATE 200 MG/1
25 TABLET, FILM COATED ORAL ONCE
Qty: 0 | Refills: 0 | Status: COMPLETED | OUTPATIENT
Start: 2018-07-19 | End: 2018-07-19

## 2018-07-19 RX ORDER — POTASSIUM CHLORIDE 20 MEQ
10 PACKET (EA) ORAL ONCE
Qty: 0 | Refills: 0 | Status: DISCONTINUED | OUTPATIENT
Start: 2018-07-19 | End: 2018-07-19

## 2018-07-19 RX ORDER — QUETIAPINE FUMARATE 200 MG/1
50 TABLET, FILM COATED ORAL EVERY 12 HOURS
Qty: 0 | Refills: 0 | Status: DISCONTINUED | OUTPATIENT
Start: 2018-07-19 | End: 2018-07-21

## 2018-07-19 RX ADMIN — Medication 325 MILLIGRAM(S): at 11:22

## 2018-07-19 RX ADMIN — Medication 40 MILLIGRAM(S): at 05:28

## 2018-07-19 RX ADMIN — HEPARIN SODIUM 5000 UNIT(S): 5000 INJECTION INTRAVENOUS; SUBCUTANEOUS at 22:47

## 2018-07-19 RX ADMIN — PROPOFOL 0.17 MICROGRAM(S)/KG/MIN: 10 INJECTION, EMULSION INTRAVENOUS at 17:17

## 2018-07-19 RX ADMIN — Medication 110 MILLIGRAM(S): at 17:17

## 2018-07-19 RX ADMIN — HEPARIN SODIUM 5000 UNIT(S): 5000 INJECTION INTRAVENOUS; SUBCUTANEOUS at 05:24

## 2018-07-19 RX ADMIN — AMLODIPINE BESYLATE 10 MILLIGRAM(S): 2.5 TABLET ORAL at 05:24

## 2018-07-19 RX ADMIN — Medication 1 DROP(S): at 23:55

## 2018-07-19 RX ADMIN — CHLORHEXIDINE GLUCONATE 15 MILLILITER(S): 213 SOLUTION TOPICAL at 17:18

## 2018-07-19 RX ADMIN — CEFEPIME 100 MILLIGRAM(S): 1 INJECTION, POWDER, FOR SOLUTION INTRAMUSCULAR; INTRAVENOUS at 15:23

## 2018-07-19 RX ADMIN — PROPOFOL 0.17 MICROGRAM(S)/KG/MIN: 10 INJECTION, EMULSION INTRAVENOUS at 12:17

## 2018-07-19 RX ADMIN — Medication 1 PUFF(S): at 04:13

## 2018-07-19 RX ADMIN — Medication 1 DROP(S): at 17:15

## 2018-07-19 RX ADMIN — Medication 40 MILLIGRAM(S): at 17:15

## 2018-07-19 RX ADMIN — Medication 110 MILLIGRAM(S): at 05:24

## 2018-07-19 RX ADMIN — Medication 40 MILLIGRAM(S): at 14:17

## 2018-07-19 RX ADMIN — HEPARIN SODIUM 5000 UNIT(S): 5000 INJECTION INTRAVENOUS; SUBCUTANEOUS at 14:17

## 2018-07-19 RX ADMIN — Medication 1 DROP(S): at 05:24

## 2018-07-19 RX ADMIN — FENTANYL CITRATE 2.87 MICROGRAM(S)/KG/HR: 50 INJECTION INTRAVENOUS at 12:17

## 2018-07-19 RX ADMIN — QUETIAPINE FUMARATE 25 MILLIGRAM(S): 200 TABLET, FILM COATED ORAL at 05:25

## 2018-07-19 RX ADMIN — Medication 1 PUFF(S): at 09:41

## 2018-07-19 RX ADMIN — Medication 1 DROP(S): at 11:23

## 2018-07-19 RX ADMIN — Medication 3: at 23:56

## 2018-07-19 RX ADMIN — Medication 1 PUFF(S): at 15:27

## 2018-07-19 RX ADMIN — Medication 0: at 17:16

## 2018-07-19 RX ADMIN — Medication 40 MILLIGRAM(S): at 00:02

## 2018-07-19 RX ADMIN — QUETIAPINE FUMARATE 25 MILLIGRAM(S): 200 TABLET, FILM COATED ORAL at 10:15

## 2018-07-19 RX ADMIN — ALBUTEROL 2 PUFF(S): 90 AEROSOL, METERED ORAL at 21:01

## 2018-07-19 RX ADMIN — PANTOPRAZOLE SODIUM 40 MILLIGRAM(S): 20 TABLET, DELAYED RELEASE ORAL at 11:22

## 2018-07-19 RX ADMIN — ALBUTEROL 2 PUFF(S): 90 AEROSOL, METERED ORAL at 15:27

## 2018-07-19 RX ADMIN — QUETIAPINE FUMARATE 50 MILLIGRAM(S): 200 TABLET, FILM COATED ORAL at 17:19

## 2018-07-19 RX ADMIN — CHLORHEXIDINE GLUCONATE 1 APPLICATION(S): 213 SOLUTION TOPICAL at 05:26

## 2018-07-19 RX ADMIN — Medication 1: at 00:03

## 2018-07-19 RX ADMIN — Medication 0: at 11:22

## 2018-07-19 RX ADMIN — CHLORHEXIDINE GLUCONATE 15 MILLILITER(S): 213 SOLUTION TOPICAL at 05:24

## 2018-07-19 RX ADMIN — Medication 1: at 06:31

## 2018-07-19 RX ADMIN — ALBUTEROL 2 PUFF(S): 90 AEROSOL, METERED ORAL at 09:40

## 2018-07-19 RX ADMIN — ALBUTEROL 2 PUFF(S): 90 AEROSOL, METERED ORAL at 04:13

## 2018-07-19 RX ADMIN — Medication 1 PUFF(S): at 21:01

## 2018-07-19 NOTE — PROGRESS NOTE ADULT - ASSESSMENT
Upon reassessment at ~0100, pt with firm abdomen when compared to earlier assessment.    Pt is an 83yr old woman with PMHx as above, now in the ICU with acute on chronic hypercarbic, hypoxic respiratory failure, hemorrhagic shock requiring multiple blood product transfusion and pressor support, lactic acidosis, DOC with associated metabolic acidosis. Acute anemia concerning for AAA dissection.    --Will continue with code fusion  --titrate levophed for MAP >65  --Add Vasopressin   --IVF with albumin per Dr. Vega  --Increase steroids for shock state  --Adjust vent to compensate for acidotic state  --Bryson for strict I/Os  --Repeat labs post transfusions  --Gastric lavage without bloody output, no blood noted in emesis or in urine. No melena or bright red blood reported.  --Ab/Pel when more stable, will get chest/abdomen xray for now      Critical care time 120 minutes  Case discussed at length with Dr. Vega in the eICU Upon reassessment at ~0100, pt with firm abdomen when compared to earlier assessment.    Pt is an 83yr old woman with PMHx as above, now in the ICU with acute on chronic hypercarbic, hypoxic respiratory failure, hemorrhagic shock requiring multiple blood product transfusion and pressor support, lactic acidosis, DOC with associated metabolic acidosis. Acute anemia concerning for AAA dissection.    --Will continue with code fusion  --titrate levophed for MAP >65  --Add Vasopressin   --IVF with albumin per Dr. Vega  --Increase steroids for shock state  --Adjust vent to compensate for acidotic state  --Bryson for strict I/Os  --Repeat labs post transfusions  --Gastric lavage without bloody output, no blood noted in emesis or in urine. No melena or bright red blood reported.  --Ab/Pel when more stable, will get chest/abdomen xray for now      Critical care time 120 minutes  Case discussed at length with Dr. Vega in the eICU      f/u -- Repeat labs with increase in WBC form 15 --> 25 with lactic acid 5.0 --> 5.5 despite appropriate hemoglobin response. Stat blood/urine/resp cultures, change antibiotics from Doxy/Cefepime to Vanc/Lana. Surgery consult. Discussed all above plans with Dr. Vega who agrees. Upon reassessment at ~0100, pt with firm abdomen when compared to earlier assessment.    Pt is an 83yr old woman with PMHx as above, now in the ICU with acute on chronic hypercarbic, hypoxic respiratory failure, hemorrhagic shock requiring multiple blood product transfusion and pressor support, lactic acidosis, DOC with associated metabolic acidosis. Acute anemia concerning for AAA dissection.    --Will continue with code fusion  --titrate levophed for MAP >65  --Add Vasopressin   --IVF with albumin per Dr. Vega  --Increase steroids for shock state  --Adjust vent to compensate for acidotic state  --Bryson for strict I/Os  --Repeat labs post transfusions  --Gastric lavage without bloody output, no blood noted in emesis or in urine. No melena or bright red blood reported.  --Ab/Pel when more stable, will get chest/abdomen xray for now      Critical care time 120 minutes  Case discussed at length with Dr. Vega in the eICU      f/u -- Repeat labs with increase in WBC form 15 --> 25 with lactic acid 5.0 --> 5.5 despite appropriate hemoglobin response. Stat blood/urine/resp cultures, IVPB calcium, change antibiotics from Doxy/Cefepime to Vanc/Lana. Surgery consult. Discussed all above plans with Dr. Vega who agrees. Upon reassessment at ~0100, pt with firm abdomen when compared to earlier assessment.    Pt is an 83yr old woman with PMHx as above, now in the ICU with acute on chronic hypercarbic, hypoxic respiratory failure, hemorrhagic shock requiring multiple blood product transfusion and pressor support, lactic acidosis, DOC with associated metabolic acidosis. Acute anemia concerning for AAA dissection.    --Will continue with code fusion  --titrate levophed for MAP >65  --Add Vasopressin   --IVF with albumin per Dr. Vega  --Increase steroids for shock state  --Adjust vent to compensate for acidotic state  --Bryson for strict I/Os  --Repeat labs post transfusions  --Gastric lavage without bloody output, no blood noted in emesis or in urine. No melena or bright red blood reported.  --Ab/Pel when more stable, will get chest/abdomen xray for now      Critical care time 120 minutes  Case discussed at length with Dr. Vega in the eICU      f/u -- Repeat labs with increase in WBC form 15 --> 25 with lactic acid 5.0 --> 5.5 despite appropriate hemoglobin response. 1 additional unit of PRBC, stat blood/urine/resp cultures, IVPB calcium, change antibiotics from Doxy/Cefepime to Vanc/Lana. Surgery consult. Discussed all above plans with Dr. Vega who agrees.     Case discussed with Dr. Workman (surgeon on call) who advices the patient be transferred to [place] as "[facility] cannot service" AAA. Case again discussed with Dr. Vega who advises the patient is not stable enough for transfer (on max levo, vasopressor, volume/blood product support). Attempt made to call emergency contact on file, Riky Doyle at [phone] without answer, voicemail left.

## 2018-07-19 NOTE — PROGRESS NOTE ADULT - SUBJECTIVE AND OBJECTIVE BOX
Follow-up Critical Care Progress Note  Chief Complaint : Acute respiratory distress    no new events  cpap trial done failed x 2  secretions improved  wheezing improved      Allergies :No Known Allergies      PAST MEDICAL & SURGICAL HISTORY:  Thoracic aortic aneurysm without rupture  Hypertension  Asthma  Depression  COPD (chronic obstructive pulmonary disease)  No significant past surgical history      Medications:  MEDICATIONS  (STANDING):  ALBUTerol    90 MICROgram(s) HFA Inhaler 2 Puff(s) Inhalation every 6 hours  amLODIPine   Tablet 10 milliGRAM(s) Oral daily  artificial  tears Solution 1 Drop(s) Both EYES every 6 hours  cefepime   IVPB 2000 milliGRAM(s) IV Intermittent every 24 hours  chlorhexidine 0.12% Liquid 15 milliLiter(s) Swish and Spit two times a day  chlorhexidine 4% Liquid 1 Application(s) Topical <User Schedule>  dextrose 50% Injectable 12.5 Gram(s) IV Push once  dextrose 50% Injectable 25 Gram(s) IV Push once  dextrose 50% Injectable 25 Gram(s) IV Push once  doxycycline IVPB      doxycycline IVPB 100 milliGRAM(s) IV Intermittent every 12 hours  fentaNYL   Infusion. 0.5 MICROgram(s)/kG/Hr (2.87 mL/Hr) IV Continuous <Continuous>  ferrous sulfate Oral Tab/Cap - Peds 325 milliGRAM(s) Oral daily  heparin  Injectable 5000 Unit(s) SubCutaneous every 8 hours  insulin lispro (HumaLOG) corrective regimen sliding scale   SubCutaneous every 6 hours  ipratropium 17 MICROgram(s) HFA Inhaler 1 Puff(s) Inhalation every 6 hours  methylPREDNISolone sodium succinate Injectable 40 milliGRAM(s) IV Push every 8 hours  pantoprazole  Injectable 40 milliGRAM(s) IV Push daily  propofol Infusion 0.5 MICROgram(s)/kG/Min (0.172 mL/Hr) IV Continuous <Continuous>  QUEtiapine 50 milliGRAM(s) Oral every 12 hours    MEDICATIONS  (PRN):  acetaminophen   Tablet 650 milliGRAM(s) Oral every 6 hours PRN For Temp greater than 38.5 C (101.3 F)  dextrose 40% Gel 15 Gram(s) Oral once PRN Blood Glucose LESS THAN 70 milliGRAM(s)/deciliter  fentaNYL    Injectable 25 MICROGram(s) IV Push every 6 hours PRN Mild Pain (1 - 3)  glucagon  Injectable 1 milliGRAM(s) IntraMuscular once PRN Glucose LESS THAN 70 milligrams/deciliter  hydrALAZINE Injectable 10 milliGRAM(s) IV Push every 6 hours PRN SBP > 180      LABS:                        7.6    12.1  )-----------( 174      ( 19 Jul 2018 05:15 )             25.0     07-19    147<H>  |  111<H>  |  69<H>  ----------------------------<  151<H>  3.2<L>   |  26  |  2.28<H>    Ca    8.8      19 Jul 2018 05:15  Phos  4.5     07-18  Mg     2.1     07-18        CULTURES: (if applicable)    Culture - Sputum (collected 07-16-18 @ 05:00)  Source: .Sputum Sputum trap  Gram Stain (07-16-18 @ 12:43):    Moderate polymorphonuclear leukocytes seen per low power field    No Squamous epithelial cells seen per low power field    No organisms seen  Final Report (07-18-18 @ 08:06):    No growth    Culture - Blood (collected 07-14-18 @ 10:10)  Source: .Blood Blood-Peripheral  Preliminary Report (07-15-18 @ 23:01):    No growth to date.    Culture - Blood (collected 07-14-18 @ 09:14)  Source: .Blood Blood-Peripheral  Preliminary Report (07-15-18 @ 23:01):    No growth to date.          ABG - ( 18 Jul 2018 14:05 )  pH, Arterial: 7.35  pH, Blood: x     /  pCO2: 42    /  pO2: 127   / HCO3: x     / Base Excess: x     /  SaO2: 98        VITALS:  T(C): 36.4 (07-19-18 @ 08:00), Max: 36.7 (07-18-18 @ 16:00)  T(F): 97.5 (07-19-18 @ 08:00), Max: 98 (07-18-18 @ 16:00)  HR: 114 (07-19-18 @ 13:00) (51 - 142)  BP: 174/91 (07-19-18 @ 13:00) (92/57 - 211/108)  BP(mean): 114 (07-19-18 @ 13:00) (68 - 152)  ABP: --  ABP(mean): --  RR: 22 (07-19-18 @ 13:00) (18 - 28)  SpO2: 100% (07-19-18 @ 13:00) (92% - 100%)  CVP(mm Hg): --  CVP(cm H2O): --    Ins and Outs     07-18-18 @ 07:01  -  07-19-18 @ 07:00  --------------------------------------------------------  IN: 1704.5 mL / OUT: 1145 mL / NET: 559.5 mL    07-19-18 @ 07:01  -  07-19-18 @ 14:55  --------------------------------------------------------  IN: 83.6 mL / OUT: 60 mL / NET: 23.6 mL            Device: 840, Mode: AC/ CMV (Assist Control/ Continuous Mandatory Ventilation), RR (machine): 20, RR (patient): 20, TV (machine): 450, TV (patient): 459, FiO2: 40, PEEP: 5, MAP: 13, PIP: 35

## 2018-07-19 NOTE — PROGRESS NOTE ADULT - SUBJECTIVE AND OBJECTIVE BOX
Pt is a       IMPRESSION:    1. No filling defects identified within the segmental pulmonary arterial   tree to indicate pulmonary was them.  2. There is evidence for increased caliber of the distal aortic arch   measuring 3.8 cm. A region of atherosclerotic ulceration is identified   along the medial/inferior aspect of the aortic arch (no prior   contrast-enhanced CT evaluation of the chest is available for   comparison). The caliber of the descending thoracic aorta measures up to   3.8 cm, demonstrating prominent mural plaque, unchanged in size. The   caliber of the aorta at the level of the diaphragmatic hiatus is   unchanged measuring 3.7 cm. Aneurysmal dilatation of the abdominal aorta   is identified, significantly increased in size measuring up to 6.0 cm   with circumferential mural thrombus identified. There is no evidence for   aortic rupture or aortic dissection.  3. There are regions of irregular nodular opacity and increased   interstitial opacity within the right lower lobe lung parenchyma. Regions   of interstitial and airspace opacities are identified throughout the left   upper lobe and left lower lobe with dense left lower lobe consolidation   evident. Pt is an 83yr old woman with PMHx including HTN, COPD on home o2/prednisone, TIA and dementia. Pt presented to the ER on 7/14 from a nursing home with chief complaint of AMS and respiratory distress. Pt was intubated in the ER for acute on chronic hypercarbic, hypoxic respiratory failure and admitted to the ICU for further management. Hospital course also notable for noted growth in AAA to 6.0cm and DOC.     Writer noted that pt -110s and HR 50s when previously SBP 150s with HR 80s. Writer advised bedside RN to reduce propofol, which was at 45mcg/kg/min. Upon reassessment, pt sedated with 35mcg/kg/min and 4mcg/kg/hr of Fentanyl and awake/asynchronous with the vent.       IMPRESSION:    1. No filling defects identified within the segmental pulmonary arterial   tree to indicate pulmonary was them.  2. There is evidence for increased caliber of the distal aortic arch   measuring 3.8 cm. A region of atherosclerotic ulceration is identified   along the medial/inferior aspect of the aortic arch (no prior   contrast-enhanced CT evaluation of the chest is available for   comparison). The caliber of the descending thoracic aorta measures up to   3.8 cm, demonstrating prominent mural plaque, unchanged in size. The   caliber of the aorta at the level of the diaphragmatic hiatus is   unchanged measuring 3.7 cm. Aneurysmal dilatation of the abdominal aorta   is identified, significantly increased in size measuring up to 6.0 cm   with circumferential mural thrombus identified. There is no evidence for   aortic rupture or aortic dissection.  3. There are regions of irregular nodular opacity and increased   interstitial opacity within the right lower lobe lung parenchyma. Regions   of interstitial and airspace opacities are identified throughout the left   upper lobe and left lower lobe with dense left lower lobe consolidation   evident. Pt is an 83yr old woman with PMHx including HTN, COPD on home o2/prednisone, TIA and dementia. Pt presented to the ER on 7/14 from a nursing home with chief complaint of AMS and respiratory distress. Pt was intubated in the ER for acute on chronic hypercarbic, hypoxic respiratory failure and admitted to the ICU for further management. Hospital course also notable for noted growth in AAA to 6.0cm and DOC.     At approximately 2130 writer noted that pt -110s and HR 50s when previously SBP 150s with HR 80s. Writer advised bedside RN to reduce propofol, which was at 45mcg/kg/min. Upon reassessment, pt RASS 1-2 with 35mcg/kg/min and 4mcg/kg/hr of Fentanyl and awake/asynchronous with the vent. Sedation adjusted.    Pulm: Mechanical ventilation 450/20/40/5, platpressure 22, lungs diminished at bases  Cardiac: s1, s2, no edema appreciated, weak pedal pulses, strong radial  Ab: hypoactive bowel sounds, soft, no tenderness to palpation, tube feeding via NGT  Ex: BUE edema, no BLE edema appreciated, hyperpigmented BLE    At approximately 0030 called to bedside to assess pt for profound acute hypotension to SBP 60s, HR 60s, pt unarousable. All sedation turned off immediately, tube feedings turned off and pt placed in Trendelenburg position.      --Levophed stat ordered  --Stat LR bolus ordered  --Stat labs and ABG drawn by writer  --Chest/abdomen xray    At approx 0100 labs resulted as below    CBC Full  -  ( 20 Jul 2018 00:30 )  WBC Count : 15.0 K/uL  Hemoglobin : 5.3 g/dL  Hematocrit : 17.2 %  Platelet Count - Automated : 224 K/uL  Mean Cell Volume : 80.0 fl  Mean Cell Hemoglobin : 24.7 pg  Mean Cell Hemoglobin Concentration : 30.9 gm/dL  Auto Neutrophil # : x  Auto Lymphocyte # : x  Auto Monocyte # : x  Auto Eosinophil # : x  Auto Basophil # : x  Auto Neutrophil % : x  Auto Lymphocyte % : x  Auto Monocyte % : x  Auto Eosinophil % : x  Auto Basophil % : x    07-20    143  |  109<H>  |  70<H>  ----------------------------<  264<H>  3.6   |  21<L>  |  2.69<H>    Ca    8.1<L>      20 Jul 2018 00:30  Phos  5.3     07-20  Mg     2.0     07-20    TPro  3.8<L>  /  Alb  1.4<L>  /  TBili  0.2  /  DBili  x   /  AST  39  /  ALT  53<H>  /  AlkPhos  68  07-20        LIVER FUNCTIONS - ( 20 Jul 2018 00:30 )  Alb: 1.4 g/dL / Pro: 3.8 g/dL / ALK PHOS: 68 U/L / ALT: 53 U/L DA / AST: 39 U/L / GGT: x Pt is an 83yr old woman with PMHx including HTN, COPD on home o2/prednisone, TIA and dementia. Pt presented to the ER on 7/14 from a nursing home with chief complaint of AMS and respiratory distress. Pt was intubated in the ER for acute on chronic hypercarbic, hypoxic respiratory failure and admitted to the ICU for further management. Hospital course also notable for noted growth in AAA to 6.0cm and DOC.     At approximately 2130 writer noted that pt -110s and HR 50s when previously SBP 150s with HR 80s. Writer advised bedside RN to reduce propofol, which was at 45mcg/kg/min. Upon reassessment, pt RASS 1-2 with 35mcg/kg/min and 4mcg/kg/hr of Fentanyl and awake/asynchronous with the vent. Sedation adjusted.    Pulm: Mechanical ventilation 450/20/40/5, platpressure 22, lungs diminished at bases  Cardiac: s1, s2, no edema appreciated, weak pedal pulses, strong radial  Ab: hypoactive bowel sounds, soft, no tenderness to palpation, tube feeding via NGT  Ex: BUE edema, no BLE edema appreciated, hyperpigmented BLE    At approximately 0030 called to bedside to assess pt for profound acute hypotension to SBP 60s, HR 60s, pt unarousable. All sedation turned off immediately, tube feedings turned off and pt placed in Trendelenburg position.      --Levophed stat ordered  --Stat LR bolus ordered  --Stat labs and ABG drawn by writer  --Chest/abdomen xray    Pt with improved mental status with IVF/trend position, awake, fighting ventilator. BP responsive to IVF and pressor support.    At approx 0100 labs resulted as below with increasing pressor requirement.    CBC Full  -  ( 20 Jul 2018 00:30 )  WBC Count : 15.0 K/uL  Hemoglobin : 5.3 g/dL  Hematocrit : 17.2 %  Platelet Count - Automated : 224 K/uL  Mean Cell Volume : 80.0 fl  Mean Cell Hemoglobin : 24.7 pg  Mean Cell Hemoglobin Concentration : 30.9 gm/dL  Auto Neutrophil # : x  Auto Lymphocyte # : x  Auto Monocyte # : x  Auto Eosinophil # : x  Auto Basophil # : x  Auto Neutrophil % : x  Auto Lymphocyte % : x  Auto Monocyte % : x  Auto Eosinophil % : x  Auto Basophil % : x    07-20    143  |  109<H>  |  70<H>  ----------------------------<  264<H>  3.6   |  21<L>  |  2.69<H>    Ca    8.1<L>      20 Jul 2018 00:30  Phos  5.3     07-20  Mg     2.0     07-20    TPro  3.8<L>  /  Alb  1.4<L>  /  TBili  0.2  /  DBili  x   /  AST  39  /  ALT  53<H>  /  AlkPhos  68  07-20        LIVER FUNCTIONS - ( 20 Jul 2018 00:30 )  Alb: 1.4 g/dL / Pro: 3.8 g/dL / ALK PHOS: 68 U/L / ALT: 53 U/L DA / AST: 39 U/L / GGT: x Pt is an 83yr old woman with PMHx including HTN, COPD on home o2/prednisone, TIA and dementia. Pt presented to the ER on 7/14 from a nursing home with chief complaint of AMS and respiratory distress. Pt was intubated in the ER for acute on chronic hypercarbic, hypoxic respiratory failure and admitted to the ICU for further management. Hospital course also notable for noted growth in AAA to 6.0cm and DOC.     At approximately 2130 writer noted that pt -110s and HR 50s when previously SBP 150s with HR 80s. Writer advised bedside RN to reduce propofol, which was at 45mcg/kg/min. Upon reassessment, pt RASS 1-2 with 35mcg/kg/min and 4mcg/kg/hr of Fentanyl and awake/asynchronous with the vent. Sedation adjusted.    Pulm: Mechanical ventilation 450/20/40/5, platpressure 22, lungs diminished at bases  Cardiac: s1, s2, no edema appreciated, weak pedal pulses, strong radial  Ab: hypoactive bowel sounds, no grimace/sign of pain to palpation, tube feeding via NGT  Ex: BUE edema, no BLE edema appreciated, hyperpigmented BLE    At approximately 0030 called to bedside to assess pt for profound acute hypotension to SBP 60s, HR 60s, pt unarousable. All sedation turned off immediately, tube feedings turned off and pt placed in Trendelenburg position.      --Levophed stat ordered  --Stat LR bolus ordered  --Stat labs and ABG drawn by writer  --Chest/abdomen xray    Pt with improved mental status with IVF/trend position, awake, fighting ventilator. BP responsive to IVF and pressor support.    At approx 0100 labs resulted as below with increasing pressor requirement.    CBC Full  -  ( 20 Jul 2018 00:30 )  WBC Count : 15.0 K/uL  Hemoglobin : 5.3 g/dL  Hematocrit : 17.2 %  Platelet Count - Automated : 224 K/uL  Mean Cell Volume : 80.0 fl  Mean Cell Hemoglobin : 24.7 pg  Mean Cell Hemoglobin Concentration : 30.9 gm/dL  Auto Neutrophil # : x  Auto Lymphocyte # : x  Auto Monocyte # : x  Auto Eosinophil # : x  Auto Basophil # : x  Auto Neutrophil % : x  Auto Lymphocyte % : x  Auto Monocyte % : x  Auto Eosinophil % : x  Auto Basophil % : x    07-20    143  |  109<H>  |  70<H>  ----------------------------<  264<H>  3.6   |  21<L>  |  2.69<H>    Ca    8.1<L>      20 Jul 2018 00:30  Phos  5.3     07-20  Mg     2.0     07-20    TPro  3.8<L>  /  Alb  1.4<L>  /  TBili  0.2  /  DBili  x   /  AST  39  /  ALT  53<H>  /  AlkPhos  68  07-20        LIVER FUNCTIONS - ( 20 Jul 2018 00:30 )  Alb: 1.4 g/dL / Pro: 3.8 g/dL / ALK PHOS: 68 U/L / ALT: 53 U/L DA / AST: 39 U/L / GGT: x

## 2018-07-19 NOTE — PROGRESS NOTE ADULT - ASSESSMENT
Acute respiratory failure in an SNF patient  History of HTN and COPD  Known thoracic aneurysm  Tx for pneumonia- remains empiric  Blood and sputum culture negative  Legionella urine antigen negative  Afebrile  CXR unchanged b/l infiltrates  CKD stable  Echo without gross cardiac dysfunction  Not making progress with weaning  Suggest:  Continue present regimen- Cefepime + Doxy, favor 7 days  Supportive care  Follow temps and CBC/diff   Weaning as outlined- plans for tomorrow noted   Condition remains guarded, ? trach vs wean  D/w NP

## 2018-07-19 NOTE — PROGRESS NOTE ADULT - SUBJECTIVE AND OBJECTIVE BOX
CC: f/u for pneumonia and respiratory failure    Patient reports: she is sedated on vent, not weaning    REVIEW OF SYSTEMS:  All other review of systems negative (Comprehensive ROS): cannot obtain    Antimicrobials Day #  day 5:  cefepime   IVPB 2000 milliGRAM(s) IV Intermittent every 24 hours  doxycycline IVPB      doxycycline IVPB 100 milliGRAM(s) IV Intermittent every 12 hours    Other Medications Reviewed    T(F): 97.5 (07-19-18 @ 08:00), Max: 98 (07-18-18 @ 16:00)  HR: 114 (07-19-18 @ 13:00)  BP: 174/91 (07-19-18 @ 13:00)  RR: 22 (07-19-18 @ 13:00)  SpO2: 100% (07-19-18 @ 13:00)  Wt(kg): --    PHYSICAL EXAM:  General: sedated on vent , no acute distress  Eyes:  anicteric, no conjunctival injection, no discharge  Oropharynx: ETT	  Neck: supple, without adenopathy  Lungs: few ronchi  Heart: regular rate and rhythm; no murmur, rubs or gallops  Abdomen: soft, nondistended, nontender, without mass or organomegaly  Skin: no lesions  Extremities: no clubbing, cyanosis, or edema  Neurologic: cannot fully assess secondary to condition    LAB RESULTS:                        7.6    12.1  )-----------( 174      ( 19 Jul 2018 05:15 )             25.0     07-19    147<H>  |  111<H>  |  69<H>  ----------------------------<  151<H>  3.2<L>   |  26  |  2.28<H>    Ca    8.8      19 Jul 2018 05:15  Phos  4.5     07-18  Mg     2.1     07-18          MICROBIOLOGY:  RECENT CULTURES:  07-16 @ 05:00 .Sputum Sputum trap     No growth    Moderate polymorphonuclear leukocytes seen per low power field  No Squamous epithelial cells seen per low power field  No organisms seen    admission blood cultures are negative    RADIOLOGY REVIEWED:  < from: Xray Chest 1 View-PORTABLE IMMEDIATE (07.18.18 @ 08:59) >    EXAM:  XR CHEST PORTABLE IMMED 1V      PROCEDURE DATE:  07/18/2018        INTERPRETATION:  INDICATION: Pneumonia; follow-up assessment    PRIORS: July 18, 2018    VIEWS: Portable AP radiography of the chest performed.    FINDINGS: Since prior evaluation there is no significant interval change   in position of the indwelling ETT or right IJ CVC. The distal aspect of   the indwelling NGT is not included on the radiographic evaluation. Heart   size appears within normal limits. No superior mediastinal widening is   identified. There is no significant interval change in bilateral   infiltrates, left greater than right. A small left pleural effusion is   suggested. There is no evidence for pneumothorax. No mediastinal shift is   noted. Degenerative change of the bilateral shoulder regions noted.    IMPRESSION: No significant interval change in bilateral infiltrates, left   greater than right. Small left pleural effusion suggested. No change in   position of indwelling ETT or right IJ CVC.    < end of copied text >

## 2018-07-19 NOTE — PROGRESS NOTE ADULT - ASSESSMENT
Echo 6/2018   Summary:   1. Left ventricular ejection fraction, by visual estimation, is 55 to 60%. limited views for technical reasons   2. Normal global left ventricular systolic function.   3. Normal right ventricular size and function.   4. There is no evidence of pericardial effusion.   5. Trace tricuspid regurgitation.   6. Sclerotic aortic valve with normal opening.   7. Dilatation of the descending aorta.   8. Increased relative wall thickness with normal mass index consistent with left ventricular concentric remodeling.       Physical Examination:  GENERAL:              Intubated, sedated, on Sedation vacation , pt alert, followed commands with episodes of agitation   HEENT:                  Pupils equal, reactive to light. Dry  MM, ET tube no secretions  + JVD  PULM:                    improved air entry but diminished , No significant sputum production, trace Rales, No Rhonchi, mild  b/l mild Wheezing I=E  CVS:                      S1, S2, + Murmur  ABD:                       Soft, nondistended, nontender, normoactive bowel sounds,   EXT:                       mild edema, nontender, No Cyanosis or Clubbing   Vascular:                Warm Extremities, Normal Capillary refill, Normal Distal Pulses  SKIN:                     Warm and well perfused, no rashes noted.   NEURO:                 sedated, withdraws to stimuli in all 4 extremities  PSYC:                      Sedated    Assessment  Septic shock due to b/l PNA- HCAP with Acute hypoxic abd hypercarbic respiratory failure and DOC now off pressers.   Acute on chronic COPD exacerbation.  DOC on CKD baseline cr 1.4  AAA enlarging vascular workup when stable    Plan  Mechanical ventilation to continue,   increase Seroquel to taper anxiety   ID f/u - continue cefepime/doxy  steroids to taper  continue bronchodilators  renal       Appreciate Vascular eval - When the patient is clinically stable she should follow up with Dr. Verde (chief of vascular) at [facility] for a possible endovascular repair.    Insulin SS      Family Updated: 	Son	                                 Date:  7/18  Sedation & Analgesia:	prop and fent  Diet/Nutrition:		tube feed  GI PPx:			Protonix    DVT Ppx:		Hep SQ        Activity:		     Passive rom/bed turning  Head of Bed:               35-45 deg  Glycemic Control:        insulin ss    Lines:  CENTRAL LINE: 	[x ] YES [ ] NO	                    LOCATION:   	           [facility]            DATE INSERTED:   	   7/15                 REMOVE:  [x] YES [ ] NO    PIV placement and remove  A-LINE:  	                [ ] YES [ x] NO                      LOCATION:   	                       DATE INSERTED: 		            REMOVE:  [ ] YES [ ] NO    POSADAS: 		        [x ] YES [ ] NO  		                                       DATE INSERTED:	7/14	            REMOVE:  [x ] YES [ ] NO  voiding trial    Disposition: ICU care    Goals of Care: Full code

## 2018-07-20 DIAGNOSIS — D50.0 IRON DEFICIENCY ANEMIA SECONDARY TO BLOOD LOSS (CHRONIC): ICD-10-CM

## 2018-07-20 DIAGNOSIS — R57.1 HYPOVOLEMIC SHOCK: ICD-10-CM

## 2018-07-20 DIAGNOSIS — E87.2 ACIDOSIS: ICD-10-CM

## 2018-07-20 LAB
ALBUMIN SERPL ELPH-MCNC: 1.4 G/DL — LOW (ref 3.3–5)
ALBUMIN SERPL ELPH-MCNC: 2.6 G/DL — LOW (ref 3.3–5)
ALP SERPL-CCNC: 61 U/L — SIGNIFICANT CHANGE UP (ref 40–120)
ALP SERPL-CCNC: 68 U/L — SIGNIFICANT CHANGE UP (ref 40–120)
ALT FLD-CCNC: 46 U/L DA — HIGH (ref 10–45)
ALT FLD-CCNC: 53 U/L DA — HIGH (ref 10–45)
ANION GAP SERPL CALC-SCNC: 13 MMOL/L — SIGNIFICANT CHANGE UP (ref 5–17)
ANION GAP SERPL CALC-SCNC: 17 MMOL/L — SIGNIFICANT CHANGE UP (ref 5–17)
APPEARANCE UR: CLEAR — SIGNIFICANT CHANGE UP
APTT BLD: 29.1 SEC — SIGNIFICANT CHANGE UP (ref 27.5–37.4)
APTT BLD: 34.4 SEC — SIGNIFICANT CHANGE UP (ref 27.5–37.4)
AST SERPL-CCNC: 39 U/L — SIGNIFICANT CHANGE UP (ref 10–40)
AST SERPL-CCNC: 39 U/L — SIGNIFICANT CHANGE UP (ref 10–40)
BACTERIA # UR AUTO: ABNORMAL /HPF
BILIRUB SERPL-MCNC: 0.2 MG/DL — SIGNIFICANT CHANGE UP (ref 0.2–1.2)
BILIRUB SERPL-MCNC: 0.6 MG/DL — SIGNIFICANT CHANGE UP (ref 0.2–1.2)
BILIRUB UR-MCNC: NEGATIVE — SIGNIFICANT CHANGE UP
BLOOD GAS COMMENTS ARTERIAL: SIGNIFICANT CHANGE UP
BUN SERPL-MCNC: 66 MG/DL — HIGH (ref 7–23)
BUN SERPL-MCNC: 70 MG/DL — HIGH (ref 7–23)
CALCIUM SERPL-MCNC: 7.9 MG/DL — LOW (ref 8.4–10.5)
CALCIUM SERPL-MCNC: 8.1 MG/DL — LOW (ref 8.4–10.5)
CHLORIDE SERPL-SCNC: 109 MMOL/L — HIGH (ref 96–108)
CHLORIDE SERPL-SCNC: 109 MMOL/L — HIGH (ref 96–108)
CO2 BLDA-SCNC: 17 MMOL/L — LOW (ref 22–30)
CO2 BLDA-SCNC: 20 MMOL/L — LOW (ref 22–30)
CO2 SERPL-SCNC: 20 MMOL/L — LOW (ref 22–31)
CO2 SERPL-SCNC: 21 MMOL/L — LOW (ref 22–31)
COLOR SPEC: YELLOW — SIGNIFICANT CHANGE UP
CREAT SERPL-MCNC: 2.67 MG/DL — HIGH (ref 0.5–1.3)
CREAT SERPL-MCNC: 2.69 MG/DL — HIGH (ref 0.5–1.3)
DIFF PNL FLD: ABNORMAL
EPI CELLS # UR: SIGNIFICANT CHANGE UP
FIBRINOGEN PPP-MCNC: 254 MG/DL — LOW (ref 310–510)
GAS PNL BLDA: SIGNIFICANT CHANGE UP
GAS PNL BLDA: SIGNIFICANT CHANGE UP
GLUCOSE BLDC GLUCOMTR-MCNC: 138 MG/DL — HIGH (ref 70–99)
GLUCOSE BLDC GLUCOMTR-MCNC: 138 MG/DL — HIGH (ref 70–99)
GLUCOSE BLDC GLUCOMTR-MCNC: 157 MG/DL — HIGH (ref 70–99)
GLUCOSE BLDC GLUCOMTR-MCNC: 39 MG/DL — CRITICAL LOW (ref 70–99)
GLUCOSE BLDC GLUCOMTR-MCNC: 46 MG/DL — LOW (ref 70–99)
GLUCOSE SERPL-MCNC: 187 MG/DL — HIGH (ref 70–99)
GLUCOSE SERPL-MCNC: 264 MG/DL — HIGH (ref 70–99)
GLUCOSE UR QL: NEGATIVE — SIGNIFICANT CHANGE UP
HCT VFR BLD CALC: 17.2 % — CRITICAL LOW (ref 34.5–45)
HCT VFR BLD CALC: 19.2 % — CRITICAL LOW (ref 34.5–45)
HCT VFR BLD CALC: 24.7 % — LOW (ref 34.5–45)
HCT VFR BLD CALC: 26.2 % — LOW (ref 34.5–45)
HCT VFR BLD CALC: 30.9 % — LOW (ref 34.5–45)
HGB BLD-MCNC: 10.8 G/DL — LOW (ref 11.5–15.5)
HGB BLD-MCNC: 5.3 G/DL — CRITICAL LOW (ref 11.5–15.5)
HGB BLD-MCNC: 6.3 G/DL — CRITICAL LOW (ref 11.5–15.5)
HGB BLD-MCNC: 8.1 G/DL — LOW (ref 11.5–15.5)
HGB BLD-MCNC: 8.7 G/DL — LOW (ref 11.5–15.5)
HOROWITZ INDEX BLDA+IHG-RTO: SIGNIFICANT CHANGE UP
HOROWITZ INDEX BLDA+IHG-RTO: SIGNIFICANT CHANGE UP
INR BLD: 1.23 RATIO — HIGH (ref 0.88–1.16)
INR BLD: 1.32 RATIO — HIGH (ref 0.88–1.16)
KETONES UR-MCNC: NEGATIVE — SIGNIFICANT CHANGE UP
LACTATE SERPL-SCNC: 5 MMOL/L — CRITICAL HIGH (ref 0.7–2)
LACTATE SERPL-SCNC: 5.5 MMOL/L — CRITICAL HIGH (ref 0.7–2)
LEUKOCYTE ESTERASE UR-ACNC: NEGATIVE — SIGNIFICANT CHANGE UP
MAGNESIUM SERPL-MCNC: 1.9 MG/DL — SIGNIFICANT CHANGE UP (ref 1.6–2.6)
MAGNESIUM SERPL-MCNC: 2 MG/DL — SIGNIFICANT CHANGE UP (ref 1.6–2.6)
MCHC RBC-ENTMCNC: 24.7 PG — LOW (ref 27–34)
MCHC RBC-ENTMCNC: 28.1 PG — SIGNIFICANT CHANGE UP (ref 27–34)
MCHC RBC-ENTMCNC: 28.2 PG — SIGNIFICANT CHANGE UP (ref 27–34)
MCHC RBC-ENTMCNC: 28.8 PG — SIGNIFICANT CHANGE UP (ref 27–34)
MCHC RBC-ENTMCNC: 30.1 PG — SIGNIFICANT CHANGE UP (ref 27–34)
MCHC RBC-ENTMCNC: 30.9 GM/DL — LOW (ref 32–36)
MCHC RBC-ENTMCNC: 32.9 GM/DL — SIGNIFICANT CHANGE UP (ref 32–36)
MCHC RBC-ENTMCNC: 32.9 GM/DL — SIGNIFICANT CHANGE UP (ref 32–36)
MCHC RBC-ENTMCNC: 33.3 GM/DL — SIGNIFICANT CHANGE UP (ref 32–36)
MCHC RBC-ENTMCNC: 34.8 GM/DL — SIGNIFICANT CHANGE UP (ref 32–36)
MCV RBC AUTO: 80 FL — SIGNIFICANT CHANGE UP (ref 80–100)
MCV RBC AUTO: 85.5 FL — SIGNIFICANT CHANGE UP (ref 80–100)
MCV RBC AUTO: 85.8 FL — SIGNIFICANT CHANGE UP (ref 80–100)
MCV RBC AUTO: 86.4 FL — SIGNIFICANT CHANGE UP (ref 80–100)
MCV RBC AUTO: 86.6 FL — SIGNIFICANT CHANGE UP (ref 80–100)
NITRITE UR-MCNC: NEGATIVE — SIGNIFICANT CHANGE UP
PCO2 BLDA: 38 MMHG — SIGNIFICANT CHANGE UP (ref 32–46)
PCO2 BLDA: 44 MMHG — SIGNIFICANT CHANGE UP (ref 32–46)
PH BLDA: 7.26 — LOW (ref 7.35–7.45)
PH BLDA: 7.27 — LOW (ref 7.35–7.45)
PH UR: 6 — SIGNIFICANT CHANGE UP (ref 5–8)
PHOSPHATE SERPL-MCNC: 5.3 MG/DL — HIGH (ref 2.5–4.5)
PHOSPHATE SERPL-MCNC: 6.1 MG/DL — HIGH (ref 2.5–4.5)
PLATELET # BLD AUTO: 104 K/UL — LOW (ref 150–400)
PLATELET # BLD AUTO: 136 K/UL — LOW (ref 150–400)
PLATELET # BLD AUTO: 224 K/UL — SIGNIFICANT CHANGE UP (ref 150–400)
PLATELET # BLD AUTO: 52 K/UL — LOW (ref 150–400)
PLATELET # BLD AUTO: 80 K/UL — LOW (ref 150–400)
PO2 BLDA: 90 MMHG — SIGNIFICANT CHANGE UP (ref 74–108)
PO2 BLDA: 93 MMHG — SIGNIFICANT CHANGE UP (ref 74–108)
POTASSIUM SERPL-MCNC: 3.6 MMOL/L — SIGNIFICANT CHANGE UP (ref 3.5–5.3)
POTASSIUM SERPL-MCNC: 4 MMOL/L — SIGNIFICANT CHANGE UP (ref 3.5–5.3)
POTASSIUM SERPL-SCNC: 3.6 MMOL/L — SIGNIFICANT CHANGE UP (ref 3.5–5.3)
POTASSIUM SERPL-SCNC: 4 MMOL/L — SIGNIFICANT CHANGE UP (ref 3.5–5.3)
PROT SERPL-MCNC: 3.8 G/DL — LOW (ref 6–8.3)
PROT SERPL-MCNC: 4.7 G/DL — LOW (ref 6–8.3)
PROT UR-MCNC: 100
PROTHROM AB SERPL-ACNC: 13.7 SEC — HIGH (ref 9.8–12.7)
PROTHROM AB SERPL-ACNC: 14.7 SEC — HIGH (ref 9.8–12.7)
RBC # BLD: 2.14 M/UL — LOW (ref 3.8–5.2)
RBC # BLD: 2.23 M/UL — LOW (ref 3.8–5.2)
RBC # BLD: 2.89 M/UL — LOW (ref 3.8–5.2)
RBC # BLD: 3.03 M/UL — LOW (ref 3.8–5.2)
RBC # BLD: 3.58 M/UL — LOW (ref 3.8–5.2)
RBC # FLD: 13.5 % — SIGNIFICANT CHANGE UP (ref 10.3–14.5)
RBC # FLD: 14.6 % — HIGH (ref 10.3–14.5)
RBC # FLD: 14.7 % — HIGH (ref 10.3–14.5)
RBC # FLD: 15.2 % — HIGH (ref 10.3–14.5)
RBC # FLD: 18.6 % — HIGH (ref 10.3–14.5)
RBC CASTS # UR COMP ASSIST: SIGNIFICANT CHANGE UP /HPF (ref 0–4)
SAO2 % BLDA: 96 % — SIGNIFICANT CHANGE UP (ref 92–96)
SAO2 % BLDA: 97 % — HIGH (ref 92–96)
SODIUM SERPL-SCNC: 143 MMOL/L — SIGNIFICANT CHANGE UP (ref 135–145)
SODIUM SERPL-SCNC: 146 MMOL/L — HIGH (ref 135–145)
SP GR SPEC: 1.01 — SIGNIFICANT CHANGE UP (ref 1.01–1.02)
UROBILINOGEN FLD QL: NEGATIVE — SIGNIFICANT CHANGE UP
WBC # BLD: 15 K/UL — HIGH (ref 3.8–10.5)
WBC # BLD: 25.2 K/UL — HIGH (ref 3.8–10.5)
WBC # BLD: 28.8 K/UL — HIGH (ref 3.8–10.5)
WBC # BLD: 39.1 K/UL — HIGH (ref 3.8–10.5)
WBC # BLD: 48.9 K/UL — CRITICAL HIGH (ref 3.8–10.5)
WBC # FLD AUTO: 15 K/UL — HIGH (ref 3.8–10.5)
WBC # FLD AUTO: 25.2 K/UL — HIGH (ref 3.8–10.5)
WBC # FLD AUTO: 28.8 K/UL — HIGH (ref 3.8–10.5)
WBC # FLD AUTO: 39.1 K/UL — HIGH (ref 3.8–10.5)
WBC # FLD AUTO: 48.9 K/UL — CRITICAL HIGH (ref 3.8–10.5)
WBC UR QL: SIGNIFICANT CHANGE UP /HPF (ref 0–5)

## 2018-07-20 PROCEDURE — 74018 RADEX ABDOMEN 1 VIEW: CPT | Mod: 26

## 2018-07-20 PROCEDURE — 71045 X-RAY EXAM CHEST 1 VIEW: CPT | Mod: 26

## 2018-07-20 PROCEDURE — 93306 TTE W/DOPPLER COMPLETE: CPT | Mod: 26

## 2018-07-20 PROCEDURE — 76700 US EXAM ABDOM COMPLETE: CPT | Mod: 26

## 2018-07-20 PROCEDURE — 71045 X-RAY EXAM CHEST 1 VIEW: CPT | Mod: 26,77

## 2018-07-20 RX ORDER — HYDROCORTISONE 20 MG
50 TABLET ORAL EVERY 6 HOURS
Qty: 0 | Refills: 0 | Status: DISCONTINUED | OUTPATIENT
Start: 2018-07-20 | End: 2018-07-20

## 2018-07-20 RX ORDER — SODIUM CHLORIDE 9 MG/ML
1000 INJECTION, SOLUTION INTRAVENOUS
Qty: 0 | Refills: 0 | Status: DISCONTINUED | OUTPATIENT
Start: 2018-07-20 | End: 2018-07-21

## 2018-07-20 RX ORDER — VANCOMYCIN HCL 1 G
750 VIAL (EA) INTRAVENOUS ONCE
Qty: 0 | Refills: 0 | Status: COMPLETED | OUTPATIENT
Start: 2018-07-20 | End: 2018-07-20

## 2018-07-20 RX ORDER — VASOPRESSIN 20 [USP'U]/ML
0.03 INJECTION INTRAVENOUS
Qty: 100 | Refills: 0 | Status: DISCONTINUED | OUTPATIENT
Start: 2018-07-20 | End: 2018-07-21

## 2018-07-20 RX ORDER — CALCIUM GLUCONATE 100 MG/ML
1 VIAL (ML) INTRAVENOUS ONCE
Qty: 0 | Refills: 0 | Status: COMPLETED | OUTPATIENT
Start: 2018-07-20 | End: 2018-07-20

## 2018-07-20 RX ORDER — SODIUM CHLORIDE 9 MG/ML
1000 INJECTION, SOLUTION INTRAVENOUS ONCE
Qty: 0 | Refills: 0 | Status: COMPLETED | OUTPATIENT
Start: 2018-07-20 | End: 2018-07-20

## 2018-07-20 RX ORDER — NOREPINEPHRINE BITARTRATE/D5W 8 MG/250ML
0.05 PLASTIC BAG, INJECTION (ML) INTRAVENOUS
Qty: 8 | Refills: 0 | Status: DISCONTINUED | OUTPATIENT
Start: 2018-07-20 | End: 2018-07-20

## 2018-07-20 RX ORDER — MEROPENEM 1 G/30ML
INJECTION INTRAVENOUS
Qty: 0 | Refills: 0 | Status: DISCONTINUED | OUTPATIENT
Start: 2018-07-20 | End: 2018-07-21

## 2018-07-20 RX ORDER — MEROPENEM 1 G/30ML
500 INJECTION INTRAVENOUS EVERY 12 HOURS
Qty: 0 | Refills: 0 | Status: DISCONTINUED | OUTPATIENT
Start: 2018-07-20 | End: 2018-07-21

## 2018-07-20 RX ORDER — ALBUMIN HUMAN 25 %
100 VIAL (ML) INTRAVENOUS ONCE
Qty: 0 | Refills: 0 | Status: COMPLETED | OUTPATIENT
Start: 2018-07-20 | End: 2018-07-20

## 2018-07-20 RX ORDER — NOREPINEPHRINE BITARTRATE/D5W 8 MG/250ML
0.05 PLASTIC BAG, INJECTION (ML) INTRAVENOUS
Qty: 8 | Refills: 0 | Status: DISCONTINUED | OUTPATIENT
Start: 2018-07-20 | End: 2018-07-21

## 2018-07-20 RX ORDER — CALCIUM GLUCONATE 100 MG/ML
2 VIAL (ML) INTRAVENOUS ONCE
Qty: 0 | Refills: 0 | Status: COMPLETED | OUTPATIENT
Start: 2018-07-20 | End: 2018-07-20

## 2018-07-20 RX ORDER — VANCOMYCIN HCL 1 G
750 VIAL (EA) INTRAVENOUS ONCE
Qty: 0 | Refills: 0 | Status: DISCONTINUED | OUTPATIENT
Start: 2018-07-20 | End: 2018-07-20

## 2018-07-20 RX ORDER — MEROPENEM 1 G/30ML
500 INJECTION INTRAVENOUS ONCE
Qty: 0 | Refills: 0 | Status: COMPLETED | OUTPATIENT
Start: 2018-07-20 | End: 2018-07-20

## 2018-07-20 RX ADMIN — Medication 5.38 MICROGRAM(S)/KG/MIN: at 03:06

## 2018-07-20 RX ADMIN — Medication 200 GRAM(S): at 09:33

## 2018-07-20 RX ADMIN — Medication 1 DROP(S): at 23:35

## 2018-07-20 RX ADMIN — VASOPRESSIN 1.8 UNIT(S)/MIN: 20 INJECTION INTRAVENOUS at 03:06

## 2018-07-20 RX ADMIN — Medication 1 DROP(S): at 11:49

## 2018-07-20 RX ADMIN — MEROPENEM 100 MILLIGRAM(S): 1 INJECTION INTRAVENOUS at 17:08

## 2018-07-20 RX ADMIN — Medication 5.38 MICROGRAM(S)/KG/MIN: at 10:11

## 2018-07-20 RX ADMIN — SODIUM CHLORIDE 1000 MILLILITER(S): 9 INJECTION, SOLUTION INTRAVENOUS at 01:30

## 2018-07-20 RX ADMIN — Medication 50 MILLIGRAM(S): at 03:03

## 2018-07-20 RX ADMIN — ALBUTEROL 2 PUFF(S): 90 AEROSOL, METERED ORAL at 15:01

## 2018-07-20 RX ADMIN — CHLORHEXIDINE GLUCONATE 1 APPLICATION(S): 213 SOLUTION TOPICAL at 05:44

## 2018-07-20 RX ADMIN — CHLORHEXIDINE GLUCONATE 15 MILLILITER(S): 213 SOLUTION TOPICAL at 17:10

## 2018-07-20 RX ADMIN — FENTANYL CITRATE 2.87 MICROGRAM(S)/KG/HR: 50 INJECTION INTRAVENOUS at 06:14

## 2018-07-20 RX ADMIN — Medication 200 GRAM(S): at 05:33

## 2018-07-20 RX ADMIN — ALBUTEROL 2 PUFF(S): 90 AEROSOL, METERED ORAL at 09:04

## 2018-07-20 RX ADMIN — PROPOFOL 0.17 MICROGRAM(S)/KG/MIN: 10 INJECTION, EMULSION INTRAVENOUS at 16:00

## 2018-07-20 RX ADMIN — MEROPENEM 100 MILLIGRAM(S): 1 INJECTION INTRAVENOUS at 05:10

## 2018-07-20 RX ADMIN — Medication 50 MILLIGRAM(S): at 05:44

## 2018-07-20 RX ADMIN — Medication 50 MILLILITER(S): at 02:30

## 2018-07-20 RX ADMIN — Medication 250 MILLIGRAM(S): at 05:27

## 2018-07-20 RX ADMIN — SODIUM CHLORIDE 125 MILLILITER(S): 9 INJECTION, SOLUTION INTRAVENOUS at 20:41

## 2018-07-20 RX ADMIN — Medication 1 DROP(S): at 17:09

## 2018-07-20 RX ADMIN — PANTOPRAZOLE SODIUM 40 MILLIGRAM(S): 20 TABLET, DELAYED RELEASE ORAL at 11:49

## 2018-07-20 RX ADMIN — SODIUM CHLORIDE 2000 MILLILITER(S): 9 INJECTION, SOLUTION INTRAVENOUS at 00:20

## 2018-07-20 RX ADMIN — CHLORHEXIDINE GLUCONATE 15 MILLILITER(S): 213 SOLUTION TOPICAL at 05:44

## 2018-07-20 RX ADMIN — SODIUM CHLORIDE 125 MILLILITER(S): 9 INJECTION, SOLUTION INTRAVENOUS at 13:15

## 2018-07-20 RX ADMIN — Medication 1 DROP(S): at 05:44

## 2018-07-20 RX ADMIN — FENTANYL CITRATE 2.87 MICROGRAM(S)/KG/HR: 50 INJECTION INTRAVENOUS at 22:51

## 2018-07-20 RX ADMIN — ALBUTEROL 2 PUFF(S): 90 AEROSOL, METERED ORAL at 04:18

## 2018-07-20 RX ADMIN — Medication 1 PUFF(S): at 09:00

## 2018-07-20 RX ADMIN — Medication 1 PUFF(S): at 04:18

## 2018-07-20 RX ADMIN — Medication 1: at 05:50

## 2018-07-20 RX ADMIN — PROPOFOL 0.17 MICROGRAM(S)/KG/MIN: 10 INJECTION, EMULSION INTRAVENOUS at 08:15

## 2018-07-20 RX ADMIN — Medication 1 PUFF(S): at 15:01

## 2018-07-20 NOTE — PROGRESS NOTE ADULT - ASSESSMENT
Echo 6/2018   Summary:   1. Left ventricular ejection fraction, by visual estimation, is 55 to 60%. limited views for technical reasons   2. Normal global left ventricular systolic function.   3. Normal right ventricular size and function.   4. There is no evidence of pericardial effusion.   5. Trace tricuspid regurgitation.   6. Sclerotic aortic valve with normal opening.   7. Dilatation of the descending aorta.   8. Increased relative wall thickness with normal mass index consistent with left ventricular concentric remodeling.       Physical Examination:  GENERAL:              Intubated, sedated,  HEENT:                  Pupils equal, reactive to light. Dry  MM, ET tube no secretions  + JVD  PULM:                    improved air entry but diminished , No significant sputum production, trace Rales, No Rhonchi, mild  b/l mild Wheezing I=E  CVS:                      S1, S2, + Murmur  ABD:                       Soft, distended, nontender, diminished bowel sounds,   EXT:                       mild edema, nontender, No Cyanosis or Clubbing   Vascular:                coolExtremities, slow Capillary refill, diminished LE Distal Pulses  SKIN:                     cool extremeties, no rashes noted.   NEURO:                 sedated, withdraws to stimuli in all 4 extremities  PSYC:                      Sedated    Assessment  Shock - Hypovolumic with severe lactic acidosis    r/o RP bleed, R/o AAA rupture, R/o disection, r/o ischemic gut/GIB    currently on 2 pressers - borderline BP  PNA- HCAP with Acute hypoxic abd hypercarbic   Acute on chronic COPD exacerbation.  DOC on CKD baseline cr 1.4    Plan  Mechanical ventilation to continue,   Pressers, fluids to maintain BP  STAT echo r/o Tamponade, Ascending dissection  STAT ABD US   eval AAA  case d/w Vascular sx Dr. Froylan Verde  at [facility] not candidate to transfer due to high mortality risk if AAA rupture on transport.  Stat labs  trend lactic acid   agree to broaden abx as wbc increased  continue steroids   continue bronchodilators  renal  f/u    At this time patient critical status worsened, son called in to come in to evaluate patient.     Insulin SS      Family Updated: 	Son	                                 Date:  7/20  Coming bedside  Sedation & Analgesia:	prop and fent  Diet/Nutrition:		tube feed to   GI PPx:			Protonix    DVT Ppx:		Hep SQ        Activity:		     Passive rom/bed turning  Head of Bed:               35-45 deg  Glycemic Control:        insulin ss    Lines:  CENTRAL LINE: 	[x ] YES [ ] NO	                    LOCATION:   	           [facility]            DATE INSERTED:   	   7/15                 REMOVE:  [ ]YES [ x NO    Shock  A-LINE:  	                [x ] YES [ ] NO                      LOCATION:   	                       DATE INSERTED: 	7/20	            REMOVE:  [ ] YES [x ] NO   shock  POSADAS: 		        [x ] YES [ ] NO  		                                       DATE INSERTED:	7/14	            REMOVE:  [ ] YES [ x] NO  shock  RESTRAINTS needed to maintain no interference with equipment   Disposition: ICU care    Goals of Care: Full code

## 2018-07-20 NOTE — PROVIDER CONTACT NOTE (EICU) - RECOMMENDATIONS
Requested to decrease sedation, give 1 liter LR bolus and start Levophed to maintain MAP above 65.   Will hold all antiHTN meds.    Above relayed to bedside team.

## 2018-07-20 NOTE — PROGRESS NOTE ADULT - ASSESSMENT
84 y/o female pmhx HTN, COPD, asthma, TAA, depression admitted on 7/14 from nursing home w/ acute mixed hypercapnic/hypoxic respiratory failure requiring intubation, COPD exacerbation, severe sepsis 2/2 pneumonia, and DOC. Patient now in shock state, likely hypovolemic due to drop in Hg requiring 8 PRBC, w/ severe lactic acidosis

## 2018-07-20 NOTE — PROGRESS NOTE ADULT - PROBLEM SELECTOR PLAN 6
Continue to hold all antihypertensives in the setting of recent shock state. Will restart as appropriate.
Restarted Norvasc today w/ PRN w/ 10mg hydralazine pushes for hypertension throughout the day  Normotensive now. Will need to add back remainder of home antihypertensives when appropriate.
Norvasc daily w/ PRN w/ 10mg hydralazine pushes for hypertension throughout the day  Normotensive now. Will need to add back remainder of home antihypertensives when appropriate.
due to hypoperfusion from shock state  Trend BUN/Crt  Making okay urine   Avoid nephrotoxins. Bryson catheter for Strict I&O's

## 2018-07-20 NOTE — PROGRESS NOTE ADULT - ASSESSMENT
Acute respiratory failure in an SNF patient  History of HTN and COPD  Known thoracic aneurysm  Tx for pneumonia- remains empiric, CXR some improvement  Blood and sputum culture negative  Legionella urine antigen negative  Remains afebrile  Minimal secretions    Now with acute drop in H/H (5.3/17.2), assoc shock and renal failure  Dramatic rise in WBC likely reactive to above; doubt secondary infection  Bld Cxs sent, and coverage expanded as above, but findings most suggestive of hemorrhagic shock  Pressor use, DOC, vent dependency, ?active bleeding, ?link to aneurysm, all affords very poor prognosis    Plan:  Will continue Meropenem pending f/u Cx results  Vent, pressor, transfusional support  ?CT imaging  Follow temps and CBC/diff   Prognosis poor  d/w Dr Simmons  > 35 mins spent in critical care, direct assessment of the patient, analysis of all pertinent data, discussion, counseling, and coordination; benefit, possible adverse effects, and limitations of antibiotics reviewed- above represents complex medical decision making

## 2018-07-20 NOTE — PROGRESS NOTE ADULT - PROBLEM SELECTOR PLAN 3
DOC on CKD. Likely ATN secondary to hypoperfusion during shock state, probable component of the dye load as well. UOP much improved after vasopressor therapy and Lasix. Trend BUN/Cr. Monitor lytes. Avoid nephrotoxic agents.
Solumedrol 40mg q8hr, continuing to titrate   Atrovent q6hr   Albuterol q6hr
due to hypoperfusion from acute blood loss  and shock state   IV fluids. Trend lactate
Solumedrol 40mg q6hr   Atrovent q6hr   Albuterol q6hr

## 2018-07-20 NOTE — PROVIDER CONTACT NOTE (EICU) - RECOMMENDATIONS
Will perform diagnostic lavage of gastric contents to rule out UGIB.  Will transfuse PRBC, Albumin, Vaso, Levophed and stress dose solucortef ordered.  Patient is too unstable at this point to go for a CT Chest/Abd/Pelvis to rule out aortic dissection or get a MARIA E.    Relayed to bedside team. Will perform diagnostic lavage of gastric contents to rule out UGIB.  Will transfuse PRBC, Albumin, Vaso, Levophed and stress dose solucortef ordered.  Will stop Heparin SQ.  Patient is too unstable at this point to go for a CT Chest/Abd/Pelvis to rule out aortic dissection/retroperitoneal bleed or get a MARIA E.  Once stable, will obtain CT.  Relayed to bedside team.

## 2018-07-20 NOTE — PROGRESS NOTE ADULT - PROBLEM SELECTOR PLAN 1
Likely related to pneumonia. Vasopressor therapy weaned off earlier today, will continue to closely monitor hemodynamic status and re-initiate as necessary to maintain a MAP > 65. IV steroids should help if there is a component of adrenal insufficiency as patient is on chronic prednisone. Continue empiric antibiotic therapy with cefepime and doxycycline. Blood and sputum cultures are neg for growth. Will add urine culture.
likely  due to ruptured TAA given history. Needs CT scan to confirm to r/o RP bleed/dissection. Serial abdominal exams.   Dual pressors. Levophed and vasopressin titrate to maintain MAP >65. Will add phenylephrine if needed.    d/c propofol due to worsening hypotension.   Received 8 PRBC and 2 platelets.   CBC q4hr.   Given an additional unit of platelets for plt count 52
related to COPD exacerbation and Pneumonia  Intubated on full ventilator support.  Titrate FiO2 to maintain O2 sat >92%. Failed SBT today. Will re-trial in am.   Sedated on propofol and fentanyl. Titrate for RASS 0 to -1   Aspiration precautions. Keep HOB >30.   Aggressive suctioning due to patient sputum production   VAP ppx and stress ulcer ppx w/ protonix
related to COPD exacerbation and Pneumonia  Intubated on full ventilator support.  Titrate FiO2 to maintain O2 sat >92%. Failed SBT today. Will re-trial in am.   Sedated on precedex and fentanyl. Titrate for RASS 0 to -1   Aspiration precautions. Keep HOB >30.   Aggressive suctioning due to patient significant sputum production   VAP ppx and stress ulcer ppx w/ protonix

## 2018-07-20 NOTE — PROGRESS NOTE ADULT - SUBJECTIVE AND OBJECTIVE BOX
CC: f/u for pneumonia, resp failure, and now shock    Events noted- drop in H/H, 4 Units PRBCs, FFP, abdom distention, now on pressors, minimal urine output; Cefepime/Doxy d/romy- Vanco x 1, Meropenem    Patient remains sedated on Vent    REVIEW OF SYSTEMS:  Not provided     Antimicrobials Day # 6 total, # 1  meropenem  IVPB      meropenem  IVPB 500 milliGRAM(s) IV Intermittent every 12 hours    Medications Reviewed    T(F): 97.3 (18 @ 09:00), Max: 98.5 (18 @ 21:00)  HR: 96 (18 @ 09:30)  BP: 130/81 (18 @ 09:30)  RR: 24 (18 @ 09:30)  SpO2: 100% (18 @ 09:30)    PHYSICAL EXAM:  General: no acute distress  Eyes:  anicteric, no conjunctival injection, no discharge  Oropharynx: ETT	  Neck: RIJ site clean  Lungs: coarse BSs  Heart: regular rate and rhythm; no murmur, rubs or gallops  Abdomen: soft, distended, without mass or organomegaly  Skin: no lesions  Extremities: dependent edema  Neurologic: sedated    LAB RESULTS:                        8.7    48.9  )-----------( 104      ( 2018 08:20 )             26.2     07-20    146<H>  |  109<H>  |  66<H>  ----------------------------<  187<H>  4.0   |  20<L>  |  2.67<H>    Ca    7.9<L>      2018 03:50  Phos  6.1     -  Mg     1.9     -20    TPro  4.7<L>  /  Alb  2.6<L>  /  TBili  0.6  /  DBili  x   /  AST  39  /  ALT  46<H>  /  AlkPhos  61  07-20      Urinalysis Basic - ( 2018 05:33 )    Color: Yellow / Appearance: Clear / S.015 / pH: x  Gluc: x / Ketone: Negative  / Bili: Negative / Urobili: Negative   Blood: x / Protein: 100 / Nitrite: Negative   Leuk Esterase: Negative / RBC: 0-4 /HPF / WBC 0-2 /HPF   Sq Epi: x / Non Sq Epi: Neg.-Few / Bacteria: Trace /HPF      MICROBIOLOGY:  RECENT CULTURES:   @ 05:00 .Sputum Sputum trap     No growth    RADIOLOGY REVIEWED:  Xray Chest 1 View- PORTABLE-Urgent (18 @ 02:34) >  Right central line, ET tube, feeding tube again noted. No pneumothorax.  Increased interstitial lung markings without significant change.   Bilateral airspace opacities and small pleural effusions left greater   than right overall improving on the left

## 2018-07-20 NOTE — PROGRESS NOTE ADULT - PROBLEM SELECTOR PLAN 2
continue w/ mechanical ventilation. Titrate FiO2 to maintain O2sat >92%.  Fentanyl drip sedation and to aid w/ vent dyssynchrony   duonebs 6hr  Atrovent daily  VAP ppx w/ chlorhexidine. Stress ulcer ppx w/ protonix continue w/ mechanical ventilation. Titrate FiO2 to maintain O2sat >92%.  Fentanyl drip sedation and to aid w/ vent dyssynchrony   High peak/plateau pressures changed to ARDS setting w/ low TV and high PEEP w/ some improvement in pressures. Obtain CXR to check for pneum thorax/ pulmonary edema/ ETT position   duonebs 6hr  Atrovent daily  VAP ppx w/ chlorhexidine. Stress ulcer ppx w/ protonix

## 2018-07-20 NOTE — PROVIDER CONTACT NOTE (EICU) - SITUATION
Hypotension noted on tele.  Cameraed into the room.  Patient on sedation.  PA assessing patient at bedside,

## 2018-07-20 NOTE — PROCEDURE NOTE - NSPROCDETAILS_GEN_ALL_CORE
sterile dressing applied/sterile technique, catheter placed/ultrasound guidance/guidewire recovered/lumen(s) aspirated and flushed
location identified, draped/prepped, sterile technique used, needle inserted/introduced/Seldinger technique/all materials/supplies accounted for at end of procedure/connected to a pressurized flush line/positive blood return obtained via catheter

## 2018-07-20 NOTE — PROGRESS NOTE ADULT - SUBJECTIVE AND OBJECTIVE BOX
Follow-up Critical Care Progress Note  Chief Complaint : Acute respiratory distress    overnight patient had dificult night  where drop in h/h, worsening shock, started on pressers, Required a line  evaluated by eICU overnight    bedside US done ? fluid around heart and noted fluid collection in both Lower abd quad.    This am case d/w Surg, Vascular  -  and  at [facility]  ? Ruptured AAA,     as patient on pressers they feel patient too unstable for transfer and OR intervention    Family called- son on way to hospital.        Allergies :No Known Allergies      PAST MEDICAL & SURGICAL HISTORY:  Thoracic aortic aneurysm   Hypertension  Asthma  Depression  COPD (chronic obstructive pulmonary disease)  No significant past surgical history      Medications:  MEDICATIONS  (STANDING):  ALBUTerol    90 MICROgram(s) HFA Inhaler 2 Puff(s) Inhalation every 6 hours  artificial  tears Solution 1 Drop(s) Both EYES every 6 hours  chlorhexidine 0.12% Liquid 15 milliLiter(s) Swish and Spit two times a day  chlorhexidine 4% Liquid 1 Application(s) Topical <User Schedule>  dextrose 50% Injectable 12.5 Gram(s) IV Push once  dextrose 50% Injectable 25 Gram(s) IV Push once  dextrose 50% Injectable 25 Gram(s) IV Push once  fentaNYL   Infusion. 0.5 MICROgram(s)/kG/Hr (2.87 mL/Hr) IV Continuous <Continuous>  ferrous sulfate Oral Tab/Cap - Peds 325 milliGRAM(s) Oral daily  hydrocortisone sodium succinate Injectable 50 milliGRAM(s) IV Push every 6 hours  insulin lispro (HumaLOG) corrective regimen sliding scale   SubCutaneous every 6 hours  ipratropium 17 MICROgram(s) HFA Inhaler 1 Puff(s) Inhalation every 6 hours  meropenem  IVPB      meropenem  IVPB 500 milliGRAM(s) IV Intermittent every 12 hours  norepinephrine Infusion 0.05 MICROgram(s)/kG/Min (5.381 mL/Hr) IV Continuous <Continuous>  pantoprazole  Injectable 40 milliGRAM(s) IV Push daily  propofol Infusion 0.5 MICROgram(s)/kG/Min (0.172 mL/Hr) IV Continuous <Continuous>  QUEtiapine 50 milliGRAM(s) Oral every 12 hours  vasopressin Infusion 0.03 Unit(s)/Min (1.8 mL/Hr) IV Continuous <Continuous>    MEDICATIONS  (PRN):  acetaminophen   Tablet 650 milliGRAM(s) Oral every 6 hours PRN For Temp greater than 38.5 C (101.3 F)  dextrose 40% Gel 15 Gram(s) Oral once PRN Blood Glucose LESS THAN 70 milliGRAM(s)/deciliter  fentaNYL    Injectable 25 MICROGram(s) IV Push every 6 hours PRN Mild Pain (1 - 3)  glucagon  Injectable 1 milliGRAM(s) IntraMuscular once PRN Glucose LESS THAN 70 milligrams/deciliter      LABS:                        8.1    25.2  )-----------( 136      ( 2018 03:50 )             24.7     07-20    146<H>  |  109<H>  |  66<H>  ----------------------------<  187<H>  4.0   |  20<L>  |  2.67<H>    Ca    7.9<L>      2018 03:50  Phos  6.1     07-20  Mg     1.9     07-20    TPro  4.7<L>  /  Alb  2.6<L>  /  TBili  0.6  /  DBili  x   /  AST  39  /  ALT  46<H>  /  AlkPhos  61  07-20            PT/INR - ( 2018 03:50 )   PT: 14.7 sec;   INR: 1.32 ratio         PTT - ( 2018 03:50 )  PTT:29.1 sec  Urinalysis Basic - ( 2018 05:33 )    Color: Yellow / Appearance: Clear / S.015 / pH: x  Gluc: x / Ketone: Negative  / Bili: Negative / Urobili: Negative   Blood: x / Protein: 100 / Nitrite: Negative   Leuk Esterase: Negative / RBC: 0-4 /HPF / WBC 0-2 /HPF   Sq Epi: x / Non Sq Epi: Neg.-Few / Bacteria: Trace /HPF              CULTURES: (if applicable)    Culture - Sputum (collected 18 @ 05:00)  Source: .Sputum Sputum trap  Gram Stain (18 @ 12:43):    Moderate polymorphonuclear leukocytes seen per low power field    No Squamous epithelial cells seen per low power field    No organisms seen  Final Report (18 @ 08:06):    No growth    Culture - Blood (collected 18 @ 10:10)  Source: .Blood Blood-Peripheral  Final Report (18 @ 23:00):    No growth at 5 days.    Culture - Blood (collected 18 @ 09:14)  Source: .Blood Blood-Peripheral  Final Report (18 @ 23:00):    No growth at 5 days.          ABG - ( 2018 05:10 )  pH, Arterial: 7.26  pH, Blood: x     /  pCO2: 38    /  pO2: 93    / HCO3: x     / Base Excess: x     /  SaO2: 97                CAPILLARY BLOOD GLUCOSE      POCT Blood Glucose.: 157 mg/dL (2018 05:49)    18 @ 03:50   -  8.1<L> / 24.7<L>  18 @ 00:30   -  5.3<LL> / 17.2<LL>  18 @ 15:30   -  8.4<L> / 27.8<L>  18 @ 05:15   -  7.6<L> / 25.0<L>  18 @ 06:20   -  9.4<L> / 31.4<L>      RADIOLOGY  stat echo, US abd ordered    VITALS:  T(C): 36.4 (18 @ 05:00), Max: 36.9 (18 @ 21:00)  T(F): 97.5 (18 @ 05:00), Max: 98.5 (18 @ 21:00)  HR: 105 (18 @ 06:30) (59 - 134)  BP: 112/86 (18 @ 06:30) (48/36 - 196/166)  BP(mean): 96 (18 @ 06:30) (42 - 177)  ABP: --  ABP(mean): --  RR: 24 (18 @ 06:30) (15 - 28)  SpO2: 98% (18 @ 06:22) (90% - 100%)  CVP(mm Hg): --  CVP(cm H2O): --    Ins and Outs     18 @ 07:01  -  18 @ 07:00  --------------------------------------------------------  IN: 6601.8 mL / OUT: 485 mL / NET: 6116.8 mL            Device: 840, Mode: AC/ CMV (Assist Control/ Continuous Mandatory Ventilation), RR (machine): 24, RR (patient): 24, TV (machine): 450, TV (patient): 464, FiO2: 40, PEEP: 5, MAP: 17, PIP: 40

## 2018-07-20 NOTE — PROGRESS NOTE ADULT - SUBJECTIVE AND OBJECTIVE BOX
Intubated in ICU, events noted, on pressors    Vital Signs Last 24 Hrs  T(C): 36.3 (18 @ 13:00), Max: 36.9 (18 @ 21:00)  T(F): 97.4 (18 @ 13:00), Max: 98.5 (18 @ 21:00)  HR: 72 (18 @ 14:00) (59 - 134)  BP: 156/117 (18 @ 14:00) (48/36 - 196/166)  BP(mean): 131 (18 @ 14:00) (42 - 177)  RR: 24 (18 @ 14:00) (15 - 28)  SpO2: 100% (18 @ 14:00) (90% - 100%)    Lungs - coarse BS bilaterally  Heart - S1S2  Abd - soft, distended  Extr - no edema                                               6.3    39.1  )-----------( 80       ( 2018 12:20 )             19.2     2018 03:50    146    |  109    |  66     ----------------------------<  187    4.0     |  20     |  2.67     Ca    7.9        2018 03:50  Phos  6.1       2018 03:50  Mg     1.9       2018 03:50    TPro  4.7    /  Alb  2.6    /  TBili  0.6    /  DBili  x      /  AST  39     /  ALT  46     /  AlkPhos  61     2018 03:50    LIVER FUNCTIONS - ( 2018 03:50 )  Alb: 2.6 g/dL / Pro: 4.7 g/dL / ALK PHOS: 61 U/L / ALT: 46 U/L DA / AST: 39 U/L / GGT: x           PT/INR - ( 2018 03:50 )   PT: 14.7 sec;   INR: 1.32 ratio      Urinalysis Basic - ( 2018 05:33 )    Color: Yellow / Appearance: Clear / S.015 / pH: x  Gluc: x / Ketone: Negative  / Bili: Negative / Urobili: Negative   Blood: x / Protein: 100 / Nitrite: Negative   Leuk Esterase: Negative / RBC: 0-4 /HPF / WBC 0-2 /HPF   Sq Epi: x / Non Sq Epi: Neg.-Few / Bacteria: Trace /HPF    acetaminophen   Tablet 650 milliGRAM(s) Oral every 6 hours PRN  ALBUTerol    90 MICROgram(s) HFA Inhaler 2 Puff(s) Inhalation every 6 hours  artificial  tears Solution 1 Drop(s) Both EYES every 6 hours  chlorhexidine 0.12% Liquid 15 milliLiter(s) Swish and Spit two times a day  chlorhexidine 4% Liquid 1 Application(s) Topical <User Schedule>  dextrose 40% Gel 15 Gram(s) Oral once PRN  dextrose 50% Injectable 12.5 Gram(s) IV Push once  dextrose 50% Injectable 25 Gram(s) IV Push once  dextrose 50% Injectable 25 Gram(s) IV Push once  fentaNYL    Injectable 25 MICROGram(s) IV Push every 6 hours PRN  fentaNYL   Infusion. 0.5 MICROgram(s)/kG/Hr IV Continuous <Continuous>  ferrous sulfate Oral Tab/Cap - Peds 325 milliGRAM(s) Oral daily  glucagon  Injectable 1 milliGRAM(s) IntraMuscular once PRN  insulin lispro (HumaLOG) corrective regimen sliding scale   SubCutaneous every 6 hours  ipratropium 17 MICROgram(s) HFA Inhaler 1 Puff(s) Inhalation every 6 hours  lactated ringers. 1000 milliLiter(s) IV Continuous <Continuous>  meropenem  IVPB      meropenem  IVPB 500 milliGRAM(s) IV Intermittent every 12 hours  norepinephrine Infusion 0.05 MICROgram(s)/kG/Min IV Continuous <Continuous>  pantoprazole  Injectable 40 milliGRAM(s) IV Push daily  propofol Infusion 0.5 MICROgram(s)/kG/Min IV Continuous <Continuous>  QUEtiapine 50 milliGRAM(s) Oral every 12 hours  vasopressin Infusion 0.03 Unit(s)/Min IV Continuous <Continuous>    A/P:    Known AAA, this am shock, severe anemia, s/p multiple units of PRBCs  Suspected AAA rupture  Resp failure  Intubated, on vent, on pressors  Hemodynamic DOC on CKD III  Atrophic L kidney  Prior notes reviewed, pt is not a surgical candidate  Prognosis is grave  Comfort/supportive care  Avoid nephrotoxins  DNR  D/w ICU team

## 2018-07-20 NOTE — PROGRESS NOTE ADULT - SUBJECTIVE AND OBJECTIVE BOX
Patient is a 83y old  Female who presents with a chief complaint of Acute respiratory failure (2018 15:28)      BRIEF HOSPITAL COURSE: 82 y/o female pmhx HTN, COPD, asthma, TAA, depression admitted on  from nursing home w/ acute mixed hypercapnic/hypoxic respiratory failure requiring intubation, COPD exacerbation, severe sepsis 2/2 pneumonia, and DOC. Failing SBT's.    Events last 24 hours: In last 24 hours patient became profoundly hypotensive and anemic. Requiring 8 units PRBC and dual vasopressor therapy. Unclear source for bleeding at this time, No evidence of GI bleed. Unstable for CT scan today.  Still on dual pressors, repeat H/H stable for now however platelet count falling. Patient unresponsvie while off sedation.     PAST MEDICAL & SURGICAL HISTORY:  Thoracic aortic aneurysm without rupture  Hypertension  Asthma  Depression  COPD (chronic obstructive pulmonary disease)  No significant past surgical history      Review of Systems: Unable to obtain due to intubation and patient clinical condition      Medications:  meropenem  IVPB      meropenem  IVPB 500 milliGRAM(s) IV Intermittent every 12 hours  norepinephrine Infusion 0.05 MICROgram(s)/kG/Min IV Continuous <Continuous>  ALBUTerol    90 MICROgram(s) HFA Inhaler 2 Puff(s) Inhalation every 6 hours  ipratropium 17 MICROgram(s) HFA Inhaler 1 Puff(s) Inhalation every 6 hours  acetaminophen   Tablet 650 milliGRAM(s) Oral every 6 hours PRN  fentaNYL    Injectable 25 MICROGram(s) IV Push every 6 hours PRN  fentaNYL   Infusion. 0.5 MICROgram(s)/kG/Hr IV Continuous <Continuous>  propofol Infusion 0.5 MICROgram(s)/kG/Min IV Continuous <Continuous>  QUEtiapine 50 milliGRAM(s) Oral every 12 hours  pantoprazole  Injectable 40 milliGRAM(s) IV Push daily  dextrose 40% Gel 15 Gram(s) Oral once PRN  dextrose 50% Injectable 12.5 Gram(s) IV Push once  dextrose 50% Injectable 25 Gram(s) IV Push once  dextrose 50% Injectable 25 Gram(s) IV Push once  glucagon  Injectable 1 milliGRAM(s) IntraMuscular once PRN  insulin lispro (HumaLOG) corrective regimen sliding scale   SubCutaneous every 6 hours  vasopressin Infusion 0.03 Unit(s)/Min IV Continuous <Continuous>  lactated ringers. 1000 milliLiter(s) IV Continuous <Continuous>  artificial  tears Solution 1 Drop(s) Both EYES every 6 hours  chlorhexidine 0.12% Liquid 15 milliLiter(s) Swish and Spit two times a day  chlorhexidine 4% Liquid 1 Application(s) Topical <User Schedule>      Mode: AC/ CMV (Assist Control/ Continuous Mandatory Ventilation)  RR (machine): 24  TV (machine): 450  FiO2: 40  PEEP: 5  MAP: 18  PIP: 38      ICU Vital Signs Last 24 Hrs  T(C): 36.6 (2018 21:00), Max: 36.6 (2018 21:00)  T(F): 97.8 (2018 21:00), Max: 97.8 (2018 21:00)  HR: 106 (:) (53 - 117)  BP: 68/54 (:00) (48/36 - 196/166)  BP(mean): 60 (:00) (42 - 177)  ABP: 93/79 (:00) (73/60 - 155/86)  RR: 24 (:00) (0 - 28)  SpO2: 100% (2018 21:00) (90% - 100%)      ABG - ( 2018 05:10 )  pH, Arterial: 7.26  pH, Blood: x     /  pCO2: 38    /  pO2: 93    / HCO3: x     / Base Excess: x     /  SaO2: 97                  I&O's Detail    2018 07:01  -  2018 07:00  --------------------------------------------------------  IN:    Albumin 25%: 100 mL    fentaNYL Infusion.: 420 mL    FFP (Fresh Frozen Plasma): 324 mL    Free Water: 600 mL    Lactated Ringers IV Bolus: 2375 mL    Nepro: 640 mL    norepinephrine Infusion: 354.7 mL    Packed Red Blood Cells: 1248 mL    propofol Infusion: 288.5 mL    Solution: 50 mL    Solution: 100 mL    Solution: 250 mL    vasopressin Infusion: 14.4 mL  Total IN: 6764.6 mL    OUT:    Indwelling Catheter - Urethral: 485 mL  Total OUT: 485 mL    Total NET: 6279.6 mL      2018 07:01  -  2018 22:05  --------------------------------------------------------  IN:    Cryoprecipitate: 112 mL    fentaNYL Infusion.: 266.8 mL    Frozen Plasma Cryoprecipitate Reduced: 300 mL    lactated ringers.: 2625 mL    norepinephrine Infusion: 160.2 mL    norepinephrine Infusion: 577.1 mL    Packed Red Blood Cells: 990 mL    propofol Infusion: 138.8 mL    Solution: 100 mL    Solution: 50 mL    vasopressin Infusion: 33.6 mL  Total IN: 5353.5 mL    OUT:    Indwelling Catheter - Urethral: 70 mL  Total OUT: 70 mL    Total NET: 5283.5 mL            LABS:                        10.8   28.8  )-----------( 52       ( 2018 20:20 )             30.9     07-20    146<H>  |  109<H>  |  66<H>  ----------------------------<  187<H>  4.0   |  20<L>  |  2.67<H>    Ca    7.9<L>      2018 03:50  Phos  6.1     07-20  Mg     1.9     07-20    TPro  4.7<L>  /  Alb  2.6<L>  /  TBili  0.6  /  DBili  x   /  AST  39  /  ALT  46<H>  /  AlkPhos  61  07-20          CAPILLARY BLOOD GLUCOSE      POCT Blood Glucose.: 138 mg/dL (2018 17:29)    PT/INR - ( 2018 03:50 )   PT: 14.7 sec;   INR: 1.32 ratio         PTT - ( 2018 03:50 )  PTT:29.1 sec  Urinalysis Basic - ( 2018 05:33 )    Color: Yellow / Appearance: Clear / S.015 / pH: x  Gluc: x / Ketone: Negative  / Bili: Negative / Urobili: Negative   Blood: x / Protein: 100 / Nitrite: Negative   Leuk Esterase: Negative / RBC: 0-4 /HPF / WBC 0-2 /HPF   Sq Epi: x / Non Sq Epi: Neg.-Few / Bacteria: Trace /HPF      CULTURES:  Culture Results:   No growth (18 @ 05:00)  Rapid RVP Result: NotDetec (07-15-18 @ 15:43)  Culture Results:   No growth at 5 days. (18 @ 10:10)  Culture Results:   No growth at 5 days. (18 @ 09:14)      Physical Examination:    General: intubated, unresponsive     HEENT: Pupils equal, reactive to light.  Symmetric.    PULM: Clear to auscultation bilaterally, no significant sputum production    CVS: Regular rate and rhythm, no murmurs, rubs, or gallops    ABD: Soft, nondistended, nontender, normoactive bowel sounds, no masses    EXT: No edema, nontender    SKIN: Warm and well perfused, no rashes noted.    NEURO: A&Ox3, strength 5/5 all extremities, cranial nerves grossly intact, no focal deficits    RADIOLOGY: ***    CRITICAL CARE TIME SPENT: ***  Evaluating/treating patient, reviewing data/labs/imaging, discussing case with multidisciplinary team, discussing plan/goals of care with patient/family. Non-inclusive of procedure time. Patient is a 83y old  Female who presents with a chief complaint of Acute respiratory failure (2018 15:28)      BRIEF HOSPITAL COURSE: 84 y/o female pmhx HTN, COPD, asthma, TAA, depression admitted on  from nursing home w/ acute mixed hypercapnic/hypoxic respiratory failure requiring intubation, COPD exacerbation, severe sepsis 2/2 pneumonia, and DOC. Failing SBT's.    Events last 24 hours: In last 24 hours patient became profoundly hypotensive and anemic. Requiring 8 units PRBC and dual vasopressor therapy. Unclear source for bleeding at this time, No evidence of GI bleed. Unstable for CT scan today.  Still on dual pressors, repeat H/H stable for now however platelet count falling. Patient unresponsvie while off sedation.     PAST MEDICAL & SURGICAL HISTORY:  Thoracic aortic aneurysm without rupture  Hypertension  Asthma  Depression  COPD (chronic obstructive pulmonary disease)  No significant past surgical history      Review of Systems: Unable to obtain due to intubation and patient clinical condition      Medications:  meropenem  IVPB      meropenem  IVPB 500 milliGRAM(s) IV Intermittent every 12 hours  norepinephrine Infusion 0.05 MICROgram(s)/kG/Min IV Continuous <Continuous>  ALBUTerol    90 MICROgram(s) HFA Inhaler 2 Puff(s) Inhalation every 6 hours  ipratropium 17 MICROgram(s) HFA Inhaler 1 Puff(s) Inhalation every 6 hours  acetaminophen   Tablet 650 milliGRAM(s) Oral every 6 hours PRN  fentaNYL    Injectable 25 MICROGram(s) IV Push every 6 hours PRN  fentaNYL   Infusion. 0.5 MICROgram(s)/kG/Hr IV Continuous <Continuous>  propofol Infusion 0.5 MICROgram(s)/kG/Min IV Continuous <Continuous>  QUEtiapine 50 milliGRAM(s) Oral every 12 hours  pantoprazole  Injectable 40 milliGRAM(s) IV Push daily  dextrose 40% Gel 15 Gram(s) Oral once PRN  dextrose 50% Injectable 12.5 Gram(s) IV Push once  dextrose 50% Injectable 25 Gram(s) IV Push once  dextrose 50% Injectable 25 Gram(s) IV Push once  glucagon  Injectable 1 milliGRAM(s) IntraMuscular once PRN  insulin lispro (HumaLOG) corrective regimen sliding scale   SubCutaneous every 6 hours  vasopressin Infusion 0.03 Unit(s)/Min IV Continuous <Continuous>  lactated ringers. 1000 milliLiter(s) IV Continuous <Continuous>  artificial  tears Solution 1 Drop(s) Both EYES every 6 hours  chlorhexidine 0.12% Liquid 15 milliLiter(s) Swish and Spit two times a day  chlorhexidine 4% Liquid 1 Application(s) Topical <User Schedule>      Mode: AC/ CMV (Assist Control/ Continuous Mandatory Ventilation)  RR (machine): 24  TV (machine): 450  FiO2: 40  PEEP: 5  MAP: 18  PIP: 38      ICU Vital Signs Last 24 Hrs  T(C): 36.6 (2018 21:00), Max: 36.6 (2018 21:00)  T(F): 97.8 (2018 21:00), Max: 97.8 (2018 21:00)  HR: 106 (:) (53 - 117)  BP: 68/54 (:00) (48/36 - 196/166)  BP(mean): 60 (:00) (42 - 177)  ABP: 93/79 (:00) (73/60 - 155/86)  RR: 24 (:00) (0 - 28)  SpO2: 100% (2018 21:00) (90% - 100%)      ABG - ( 2018 05:10 )  pH, Arterial: 7.26  pH, Blood: x     /  pCO2: 38    /  pO2: 93    / HCO3: x     / Base Excess: x     /  SaO2: 97                  I&O's Detail    2018 07:01  -  2018 07:00  --------------------------------------------------------  IN:    Albumin 25%: 100 mL    fentaNYL Infusion.: 420 mL    FFP (Fresh Frozen Plasma): 324 mL    Free Water: 600 mL    Lactated Ringers IV Bolus: 2375 mL    Nepro: 640 mL    norepinephrine Infusion: 354.7 mL    Packed Red Blood Cells: 1248 mL    propofol Infusion: 288.5 mL    Solution: 50 mL    Solution: 100 mL    Solution: 250 mL    vasopressin Infusion: 14.4 mL  Total IN: 6764.6 mL    OUT:    Indwelling Catheter - Urethral: 485 mL  Total OUT: 485 mL    Total NET: 6279.6 mL      2018 07:01  -  2018 22:05  --------------------------------------------------------  IN:    Cryoprecipitate: 112 mL    fentaNYL Infusion.: 266.8 mL    Frozen Plasma Cryoprecipitate Reduced: 300 mL    lactated ringers.: 2625 mL    norepinephrine Infusion: 160.2 mL    norepinephrine Infusion: 577.1 mL    Packed Red Blood Cells: 990 mL    propofol Infusion: 138.8 mL    Solution: 100 mL    Solution: 50 mL    vasopressin Infusion: 33.6 mL  Total IN: 5353.5 mL    OUT:    Indwelling Catheter - Urethral: 70 mL  Total OUT: 70 mL    Total NET: 5283.5 mL            LABS:                        10.8   28.8  )-----------( 52       ( 2018 20:20 )             30.9     07-20    146<H>  |  109<H>  |  66<H>  ----------------------------<  187<H>  4.0   |  20<L>  |  2.67<H>    Ca    7.9<L>      2018 03:50  Phos  6.1     07-20  Mg     1.9     07-20    TPro  4.7<L>  /  Alb  2.6<L>  /  TBili  0.6  /  DBili  x   /  AST  39  /  ALT  46<H>  /  AlkPhos  61  07-20          CAPILLARY BLOOD GLUCOSE      POCT Blood Glucose.: 138 mg/dL (2018 17:29)    PT/INR - ( 2018 03:50 )   PT: 14.7 sec;   INR: 1.32 ratio         PTT - ( 2018 03:50 )  PTT:29.1 sec  Urinalysis Basic - ( 2018 05:33 )    Color: Yellow / Appearance: Clear / S.015 / pH: x  Gluc: x / Ketone: Negative  / Bili: Negative / Urobili: Negative   Blood: x / Protein: 100 / Nitrite: Negative   Leuk Esterase: Negative / RBC: 0-4 /HPF / WBC 0-2 /HPF   Sq Epi: x / Non Sq Epi: Neg.-Few / Bacteria: Trace /HPF      CULTURES:  Culture Results:   No growth (18 @ 05:00)  Rapid RVP Result: NotDetec (07-15-18 @ 15:43)  Culture Results:   No growth at 5 days. (18 @ 10:10)  Culture Results:   No growth at 5 days. (18 @ 09:14)      Physical Examination:    General: intubated, unresponsive     HEENT: Pupils equal, reactive to light.  Symmetric.    PULM: Clear to auscultation bilaterally, no significant sputum production. No wheeze/rales/rhonchi.      CVS: +S1/S2. Sinus tachycardia. No murmurs. No JVD     ABD: hard, distended, non tender. + diminished BS.     EXT: non tender, mild peripheral edema of upper and lower extremities      SKIN: Warm and well perfused, no rashes noted.    NEURO: Unresponsive to verabal/painful stimuli. Does not withdraw in extremities while sedation is turned off.     RADIOLOGY: < from: US Abdomen Complete (18 @ 09:40) >    EXAM:  US ABDOMEN COMPLETE      PROCEDURE DATE:  2018        INTERPRETATION:  Ultrasound of the abdomen    History abdominal aortic aneurysm, shock    Comparison CT performed 6 days prior    There is mild uniform gallbladder wall thickening without stone,   distention or elicited Espino sign. There is no biliary dilatation. There   is mild new generalized ascites. There are small bilateral pleural   effusions. The spleen and visualized portions of the pancreas are grossly   normal. The liver is heterogeneous in echotexture suggesting cirrhosis.   There are small cysts in the kidneys without hydronephrosis or calculus.   The renal cortex on the left is echogenic implying chronic cortical   disease. There is abdominal aortic aneurysm measuring approximately 5.7   cm in AP dimension.    IMPRESSION:    Mild generalized ascites    < end of copied text >      CRITICAL CARE TIME SPENT: 50 min   Evaluating/treating patient, reviewing data/labs/imaging, discussing case with multidisciplinary team, discussing plan/goals of care with patient/family. Non-inclusive of procedure time.

## 2018-07-20 NOTE — PROCEDURE NOTE - ADDITIONAL PROCEDURE DETAILS
Arterial line placed emergently with the approval of attending Dr. Vega. Pt critically ill with labile BP on high dose pressor support with difficulty obtaining peripheral bp reading. Unable to perform Misha test properly due to edema.

## 2018-07-20 NOTE — PROGRESS NOTE ADULT - PROBLEM SELECTOR PLAN 5
Continue empiric antibiotic therapy. Chest PT and aggressive suctioning.
HCAP, due to new elevation in WBC.   Repeat cultures sent   Tylenol PRN for fever  On Meropenum q12
likely ATN from hypoperfusion   Trend BUN/Crt. Avoid nephrotoxins.   Strict I&O's.
likely ATN from hypoperfusion   Trend BUN/Crt. Avoid nephrotoxins.   Strict I&O's.

## 2018-07-20 NOTE — PROVIDER CONTACT NOTE (EICU) - SITUATION
Lab follow up with improved Hg.  Worsening abdominal distention along with persistent lactic acidosis and increasing pressor requirements.  Will broaden antibiotics to Vanco x 1 and meropenem while cultures are pending.  Will obtain surgery consult for acute abdomen/abdominal compartment syndrome.  Will Rx calcium and blood transfusion as needed to maintain Hg above 8 given shock.  Still unable to obtain CT C/A/P to rule out RP bleed/Aortic Dissection given instability.     Discussed with bedside PA. Lab follow up with improved Hg.  Worsening abdominal distention along with persistent lactic acidosis and increasing pressor requirements.  Will broaden antibiotics to Vanco x 1 and meropenem while cultures are pending.  Will obtain surgery consult for acute abdomen/abdominal compartment syndrome.  Will Rx calcium and blood transfusion as needed to maintain Hg above 8 given shock.  Still unable to obtain CT C/A/P to rule out RP bleed/Aortic Dissection and/or transfer patient to higher level of care given worsening hemodynamic instability.    Discussed with bedside PA.

## 2018-07-20 NOTE — PROCEDURE NOTE - NSINFORMCONSENT_GEN_A_CORE
This was an emergent procedure./Approved by Dr. Vega (attending)
Benefits, risks, and possible complications of procedure explained to patient/caregiver who verbalized understanding and gave written consent.

## 2018-07-20 NOTE — CHART NOTE - NSCHARTNOTEFT_GEN_A_CORE
Nutrition Follow Up:     Source: Patient [ ]    Family [ ]     other [x]: medical chart reviewed; pt intubated, sedated     Diet : NPO (tube feedings held since 1am 7/20)     Noted pt with overnight events: hypotension, anemia, abdominal distension- sedation and tube feedings d/c'd. Pressor support initiated.     Pt was previously receiving Nepro @40cc/hr x 24 hours (1,728kcals, 78g protein). Tube feedings still held while pt receiving pressor support. Noted possible plan for palliative care as per chart notes.      Enteral /Parenteral Nutrition: NPO at present       Current Weight: Weight (kg): (7/14) 57.4kg (126lbs)  (7/20) weight: 153.6lbs - pt noted with edema   % Weight Change    Pertinent Medications: MEDICATIONS  (STANDING):  ALBUTerol    90 MICROgram(s) HFA Inhaler 2 Puff(s) Inhalation every 6 hours  artificial  tears Solution 1 Drop(s) Both EYES every 6 hours  chlorhexidine 0.12% Liquid 15 milliLiter(s) Swish and Spit two times a day  chlorhexidine 4% Liquid 1 Application(s) Topical <User Schedule>  dextrose 50% Injectable 12.5 Gram(s) IV Push once  dextrose 50% Injectable 25 Gram(s) IV Push once  dextrose 50% Injectable 25 Gram(s) IV Push once  fentaNYL   Infusion. 0.5 MICROgram(s)/kG/Hr (2.87 mL/Hr) IV Continuous <Continuous>  ferrous sulfate Oral Tab/Cap - Peds 325 milliGRAM(s) Oral daily  insulin lispro (HumaLOG) corrective regimen sliding scale   SubCutaneous every 6 hours  ipratropium 17 MICROgram(s) HFA Inhaler 1 Puff(s) Inhalation every 6 hours  lactated ringers. 1000 milliLiter(s) (125 mL/Hr) IV Continuous <Continuous>  meropenem  IVPB      meropenem  IVPB 500 milliGRAM(s) IV Intermittent every 12 hours  norepinephrine Infusion 0.05 MICROgram(s)/kG/Min (5.381 mL/Hr) IV Continuous <Continuous>  pantoprazole  Injectable 40 milliGRAM(s) IV Push daily  propofol Infusion 0.5 MICROgram(s)/kG/Min (0.172 mL/Hr) IV Continuous <Continuous>  QUEtiapine 50 milliGRAM(s) Oral every 12 hours  vasopressin Infusion 0.03 Unit(s)/Min (1.8 mL/Hr) IV Continuous <Continuous>    MEDICATIONS  (PRN):  acetaminophen   Tablet 650 milliGRAM(s) Oral every 6 hours PRN For Temp greater than 38.5 C (101.3 F)  dextrose 40% Gel 15 Gram(s) Oral once PRN Blood Glucose LESS THAN 70 milliGRAM(s)/deciliter  fentaNYL    Injectable 25 MICROGram(s) IV Push every 6 hours PRN Mild Pain (1 - 3)  glucagon  Injectable 1 milliGRAM(s) IntraMuscular once PRN Glucose LESS THAN 70 milligrams/deciliter    Pertinent Labs:  (7/20) Hgb 8.1, Hct 24.7, Na 146, Cl 109, BUN 66, Creatinine 2.67, glucose 187, phosphorus 6.1, Corrected Ca++ 9.02 WNL      Skin: No pressure ulcer  Edema: +2 edema bilateral arms per flow sheets  Last BM: 7/19    Estimated Needs:   [x] no change since previous assessment  [ ] recalculated:       Previous Nutrition Diagnosis: Inadequate enteral nutrition- tube feedings currently on hold      Nutrition Diagnosis is [x] ongoing  [ ] resolved [ ] not applicable        New Nutrition Diagnosis: [x] not applicable      Interventions:     1. Recommend resume tube feedings at goal rate (Nepro @40cc/hr x24hrs) when medically feasible to meet estimated nutritional needs  2. Monitor for tolerance to feeding regimen    Monitoring and Evaluation:     [ ] PO intake [x] Tolerance to diet prescription [x] weights [x] follow up per protocol    [x] other: enteral provisions    RD to remain available  Ewa Lopes RDN

## 2018-07-20 NOTE — PROCEDURE NOTE - PROCEDURE
Arterial catheterization or cannulation  07/20/2018  left radial arterial line  Active  [id number] <<-----Click on this checkbox to enter Procedure

## 2018-07-20 NOTE — PROGRESS NOTE ADULT - PROBLEM SELECTOR PROBLEM 2
Acute respiratory failure with hypoxia and hypercapnia
Septic shock
Acute respiratory failure with hypoxia and hypercapnia
Septic shock

## 2018-07-20 NOTE — PROGRESS NOTE ADULT - PROBLEM SELECTOR PROBLEM 1
Septic shock
Acute respiratory failure with hypoxia and hypercapnia
Acute respiratory failure with hypoxia and hypercapnia
Hypovolemic shock

## 2018-07-20 NOTE — CHART NOTE - NSCHARTNOTEFT_GEN_A_CORE
I was notified by Dr. Workman approximately 6 a.m. today that the patient was markedly anemic and hypotensive coupled with abdominal distention.  The patient has a known thoraco-abdominal aneurysm which more than likely is ruptured. She has received 4 units of blood and has required pressor agents. The case was discussed with Dr. Verde, Chief of Vascular at [facility] who feels the patient will not survive transfer to [facility] or open aneurysm repair. The case was discussed with Dr. Simmons  who conferred with Dr. Verde as well.  We all feel at this time that palliative care is the most appropriate action.  Dr. Simmons has spoken with the family and has obtained a DNR.

## 2018-07-20 NOTE — PROGRESS NOTE ADULT - PROBLEM SELECTOR PLAN 4
Added routine DuoNebs. Continue IV steroids.
No definite source at this time. No GI bleed. Needs CT when stable to r/o RP bleed, TAA rupture /dissection   s/p 8 PRBC   CBC q4hr  Transfuse PRN  Plt count 52, given an additional 1 unit platelets
c/w current antibiotic regimen   Suctioning PRN  Chest PT
c/w current antibiotic regimen   Suctioning PRN  Chest PT

## 2018-07-21 VITALS — OXYGEN SATURATION: 100 %

## 2018-07-21 LAB
ANION GAP SERPL CALC-SCNC: 23 MMOL/L — HIGH (ref 5–17)
ANION GAP SERPL CALC-SCNC: 25 MMOL/L — HIGH (ref 5–17)
BUN SERPL-MCNC: 57 MG/DL — HIGH (ref 7–23)
BUN SERPL-MCNC: 65 MG/DL — HIGH (ref 7–23)
CALCIUM SERPL-MCNC: 7.4 MG/DL — LOW (ref 8.4–10.5)
CALCIUM SERPL-MCNC: 7.5 MG/DL — LOW (ref 8.4–10.5)
CHLORIDE SERPL-SCNC: 109 MMOL/L — HIGH (ref 96–108)
CHLORIDE SERPL-SCNC: 113 MMOL/L — HIGH (ref 96–108)
CO2 SERPL-SCNC: 7 MMOL/L — CRITICAL LOW (ref 22–31)
CO2 SERPL-SCNC: 8 MMOL/L — CRITICAL LOW (ref 22–31)
CREAT SERPL-MCNC: 3.03 MG/DL — HIGH (ref 0.5–1.3)
CREAT SERPL-MCNC: 3.28 MG/DL — HIGH (ref 0.5–1.3)
GLUCOSE BLDC GLUCOMTR-MCNC: 179 MG/DL — HIGH (ref 70–99)
GLUCOSE BLDC GLUCOMTR-MCNC: 205 MG/DL — HIGH (ref 70–99)
GLUCOSE SERPL-MCNC: 186 MG/DL — HIGH (ref 70–99)
GLUCOSE SERPL-MCNC: 214 MG/DL — HIGH (ref 70–99)
GRAM STN FLD: SIGNIFICANT CHANGE UP
HCT VFR BLD CALC: 14.9 % — CRITICAL LOW (ref 34.5–45)
HCT VFR BLD CALC: 8.5 % — CRITICAL LOW (ref 34.5–45)
HGB BLD-MCNC: 2.9 G/DL — CRITICAL LOW (ref 11.5–15.5)
HGB BLD-MCNC: 5.1 G/DL — CRITICAL LOW (ref 11.5–15.5)
LACTATE SERPL-SCNC: 13.7 MMOL/L — CRITICAL HIGH (ref 0.7–2)
MAGNESIUM SERPL-MCNC: 1.7 MG/DL — SIGNIFICANT CHANGE UP (ref 1.6–2.6)
MAGNESIUM SERPL-MCNC: 2 MG/DL — SIGNIFICANT CHANGE UP (ref 1.6–2.6)
MCHC RBC-ENTMCNC: 30.3 PG — SIGNIFICANT CHANGE UP (ref 27–34)
MCHC RBC-ENTMCNC: 31.4 PG — SIGNIFICANT CHANGE UP (ref 27–34)
MCHC RBC-ENTMCNC: 34.2 GM/DL — SIGNIFICANT CHANGE UP (ref 32–36)
MCHC RBC-ENTMCNC: 34.4 GM/DL — SIGNIFICANT CHANGE UP (ref 32–36)
MCV RBC AUTO: 88.6 FL — SIGNIFICANT CHANGE UP (ref 80–100)
MCV RBC AUTO: 91.3 FL — SIGNIFICANT CHANGE UP (ref 80–100)
PHOSPHATE SERPL-MCNC: 11.2 MG/DL — HIGH (ref 2.5–4.5)
PHOSPHATE SERPL-MCNC: 11.3 MG/DL — HIGH (ref 2.5–4.5)
PLATELET # BLD AUTO: 136 K/UL — LOW (ref 150–400)
PLATELET # BLD AUTO: 84 K/UL — LOW (ref 150–400)
POTASSIUM SERPL-MCNC: 5.7 MMOL/L — HIGH (ref 3.5–5.3)
POTASSIUM SERPL-MCNC: 5.7 MMOL/L — HIGH (ref 3.5–5.3)
POTASSIUM SERPL-SCNC: 5.7 MMOL/L — HIGH (ref 3.5–5.3)
POTASSIUM SERPL-SCNC: 5.7 MMOL/L — HIGH (ref 3.5–5.3)
RBC # BLD: 0.93 M/UL — LOW (ref 3.8–5.2)
RBC # BLD: 1.68 M/UL — LOW (ref 3.8–5.2)
RBC # FLD: 14 % — SIGNIFICANT CHANGE UP (ref 10.3–14.5)
RBC # FLD: 14.7 % — HIGH (ref 10.3–14.5)
SODIUM SERPL-SCNC: 142 MMOL/L — SIGNIFICANT CHANGE UP (ref 135–145)
SODIUM SERPL-SCNC: 143 MMOL/L — SIGNIFICANT CHANGE UP (ref 135–145)
SPECIMEN SOURCE: SIGNIFICANT CHANGE UP
WBC # BLD: 22 K/UL — HIGH (ref 3.8–10.5)
WBC # BLD: 26.4 K/UL — HIGH (ref 3.8–10.5)
WBC # FLD AUTO: 22 K/UL — HIGH (ref 3.8–10.5)
WBC # FLD AUTO: 26.4 K/UL — HIGH (ref 3.8–10.5)

## 2018-07-21 PROCEDURE — 86901 BLOOD TYPING SEROLOGIC RH(D): CPT

## 2018-07-21 PROCEDURE — 87486 CHLMYD PNEUM DNA AMP PROBE: CPT

## 2018-07-21 PROCEDURE — P9016: CPT

## 2018-07-21 PROCEDURE — 86850 RBC ANTIBODY SCREEN: CPT

## 2018-07-21 PROCEDURE — 93306 TTE W/DOPPLER COMPLETE: CPT

## 2018-07-21 PROCEDURE — 87449 NOS EACH ORGANISM AG IA: CPT

## 2018-07-21 PROCEDURE — 83735 ASSAY OF MAGNESIUM: CPT

## 2018-07-21 PROCEDURE — 36600 WITHDRAWAL OF ARTERIAL BLOOD: CPT

## 2018-07-21 PROCEDURE — 99291 CRITICAL CARE FIRST HOUR: CPT | Mod: 25

## 2018-07-21 PROCEDURE — 71045 X-RAY EXAM CHEST 1 VIEW: CPT

## 2018-07-21 PROCEDURE — 83605 ASSAY OF LACTIC ACID: CPT

## 2018-07-21 PROCEDURE — 93005 ELECTROCARDIOGRAM TRACING: CPT

## 2018-07-21 PROCEDURE — 96375 TX/PRO/DX INJ NEW DRUG ADDON: CPT | Mod: XU

## 2018-07-21 PROCEDURE — 87581 M.PNEUMON DNA AMP PROBE: CPT

## 2018-07-21 PROCEDURE — 80053 COMPREHEN METABOLIC PANEL: CPT

## 2018-07-21 PROCEDURE — 80048 BASIC METABOLIC PNL TOTAL CA: CPT

## 2018-07-21 PROCEDURE — 87633 RESP VIRUS 12-25 TARGETS: CPT

## 2018-07-21 PROCEDURE — 74018 RADEX ABDOMEN 1 VIEW: CPT

## 2018-07-21 PROCEDURE — 86900 BLOOD TYPING SEROLOGIC ABO: CPT

## 2018-07-21 PROCEDURE — 94660 CPAP INITIATION&MGMT: CPT

## 2018-07-21 PROCEDURE — 85610 PROTHROMBIN TIME: CPT

## 2018-07-21 PROCEDURE — 86922 COMPATIBILITY TEST ANTIGLOB: CPT

## 2018-07-21 PROCEDURE — P9037: CPT

## 2018-07-21 PROCEDURE — 76775 US EXAM ABDO BACK WALL LIM: CPT

## 2018-07-21 PROCEDURE — P9059: CPT

## 2018-07-21 PROCEDURE — 76700 US EXAM ABDOM COMPLETE: CPT

## 2018-07-21 PROCEDURE — 31500 INSERT EMERGENCY AIRWAY: CPT

## 2018-07-21 PROCEDURE — 94003 VENT MGMT INPAT SUBQ DAY: CPT

## 2018-07-21 PROCEDURE — 85730 THROMBOPLASTIN TIME PARTIAL: CPT

## 2018-07-21 PROCEDURE — 86965 POOLING BLOOD PLATELETS: CPT

## 2018-07-21 PROCEDURE — 84100 ASSAY OF PHOSPHORUS: CPT

## 2018-07-21 PROCEDURE — 87040 BLOOD CULTURE FOR BACTERIA: CPT

## 2018-07-21 PROCEDURE — 82962 GLUCOSE BLOOD TEST: CPT

## 2018-07-21 PROCEDURE — P9047: CPT

## 2018-07-21 PROCEDURE — P9012: CPT

## 2018-07-21 PROCEDURE — 94640 AIRWAY INHALATION TREATMENT: CPT

## 2018-07-21 PROCEDURE — 96374 THER/PROPH/DIAG INJ IV PUSH: CPT | Mod: XU

## 2018-07-21 PROCEDURE — 81001 URINALYSIS AUTO W/SCOPE: CPT

## 2018-07-21 PROCEDURE — 82803 BLOOD GASES ANY COMBINATION: CPT

## 2018-07-21 PROCEDURE — 74174 CTA ABD&PLVS W/CONTRAST: CPT

## 2018-07-21 PROCEDURE — 36430 TRANSFUSION BLD/BLD COMPNT: CPT

## 2018-07-21 PROCEDURE — 85384 FIBRINOGEN ACTIVITY: CPT

## 2018-07-21 PROCEDURE — 94002 VENT MGMT INPAT INIT DAY: CPT

## 2018-07-21 PROCEDURE — 71275 CT ANGIOGRAPHY CHEST: CPT

## 2018-07-21 PROCEDURE — 86923 COMPATIBILITY TEST ELECTRIC: CPT

## 2018-07-21 PROCEDURE — 85027 COMPLETE CBC AUTOMATED: CPT

## 2018-07-21 PROCEDURE — 87070 CULTURE OTHR SPECIMN AEROBIC: CPT

## 2018-07-21 RX ORDER — SODIUM CHLORIDE 9 MG/ML
1000 INJECTION, SOLUTION INTRAVENOUS
Qty: 0 | Refills: 0 | Status: DISCONTINUED | OUTPATIENT
Start: 2018-07-21 | End: 2018-07-21

## 2018-07-21 RX ORDER — INSULIN GLARGINE 100 [IU]/ML
10 INJECTION, SOLUTION SUBCUTANEOUS ONCE
Qty: 0 | Refills: 0 | Status: COMPLETED | OUTPATIENT
Start: 2018-07-21 | End: 2018-07-21

## 2018-07-21 RX ORDER — PHENYLEPHRINE HYDROCHLORIDE 10 MG/ML
2 INJECTION INTRAVENOUS
Qty: 160 | Refills: 0 | Status: DISCONTINUED | OUTPATIENT
Start: 2018-07-21 | End: 2018-07-21

## 2018-07-21 RX ORDER — NOREPINEPHRINE BITARTRATE/D5W 8 MG/250ML
0.05 PLASTIC BAG, INJECTION (ML) INTRAVENOUS
Qty: 16 | Refills: 0 | Status: DISCONTINUED | OUTPATIENT
Start: 2018-07-21 | End: 2018-07-21

## 2018-07-21 RX ORDER — CALCIUM GLUCONATE 100 MG/ML
2 VIAL (ML) INTRAVENOUS ONCE
Qty: 0 | Refills: 0 | Status: COMPLETED | OUTPATIENT
Start: 2018-07-21 | End: 2018-07-21

## 2018-07-21 RX ORDER — SODIUM CHLORIDE 9 MG/ML
1000 INJECTION INTRAMUSCULAR; INTRAVENOUS; SUBCUTANEOUS ONCE
Qty: 0 | Refills: 0 | Status: COMPLETED | OUTPATIENT
Start: 2018-07-21 | End: 2018-07-21

## 2018-07-21 RX ORDER — SODIUM CHLORIDE 9 MG/ML
500 INJECTION INTRAMUSCULAR; INTRAVENOUS; SUBCUTANEOUS ONCE
Qty: 0 | Refills: 0 | Status: DISCONTINUED | OUTPATIENT
Start: 2018-07-21 | End: 2018-07-21

## 2018-07-21 RX ADMIN — Medication 2.69 MICROGRAM(S)/KG/MIN: at 05:41

## 2018-07-21 RX ADMIN — ALBUTEROL 2 PUFF(S): 90 AEROSOL, METERED ORAL at 04:09

## 2018-07-21 RX ADMIN — INSULIN GLARGINE 10 UNIT(S): 100 INJECTION, SOLUTION SUBCUTANEOUS at 03:47

## 2018-07-21 RX ADMIN — PHENYLEPHRINE HYDROCHLORIDE 21.52 MICROGRAM(S)/KG/MIN: 10 INJECTION INTRAVENOUS at 02:04

## 2018-07-21 RX ADMIN — CHLORHEXIDINE GLUCONATE 1 APPLICATION(S): 213 SOLUTION TOPICAL at 06:59

## 2018-07-21 RX ADMIN — Medication 1 PUFF(S): at 04:09

## 2018-07-21 RX ADMIN — Medication 200 GRAM(S): at 03:23

## 2018-07-21 RX ADMIN — SODIUM CHLORIDE 2000 MILLILITER(S): 9 INJECTION INTRAMUSCULAR; INTRAVENOUS; SUBCUTANEOUS at 03:08

## 2018-07-21 RX ADMIN — Medication 1 DROP(S): at 07:38

## 2018-07-21 RX ADMIN — MEROPENEM 100 MILLIGRAM(S): 1 INJECTION INTRAVENOUS at 07:00

## 2018-07-21 NOTE — PROVIDER CONTACT NOTE (EICU) - ACTION/TREATMENT ORDERED:
Called [facility] and spoke with Eliu Archuleta who assigned number [phone]. Pt.  at 0730 as I was speaking to Eliu Archuleta. Called bedside for time of death, then called back [facility] to notify them of time of death. Spoke with Vicky Feng who stated the Pt. might qualify for donation to the brain bank.

## 2018-07-21 NOTE — DISCHARGE NOTE FOR THE EXPIRED PATIENT - HOSPITAL COURSE
83 year old female with PMH HTN, thoracic aortic aneurysm COPD/asthma on home oxygen presented to the ED with acute hypoxic hypercarbic respiratory distress and intubated.  Met sepsis severe sepsis criteria, transferred to ICU for further care.  In ICU developed DOC, persistently hypotensive and oliguric.  Central line placed, started on pressor therapy.  Weaned from pressors, creatinine normalized with good urine output.  Unable to wean from ventilator, became acutely hypotensive, noted to be acutely anemic.  Ultrasound of abdomen demonstrated acute enlargement of thoracic aortic aneurysm  Suspected rupture of thoracic aortic aneurysm, patient not a surgical candidate due to pulmonary sepsis.  DNR per family, attempted to treat with blood product transfusion and vasopressor support.    at 7:30. 83 year old female with PMH HTN, thoracic aortic aneurysm COPD/asthma on home oxygen presented to the ED with acute hypoxic hypercarbic respiratory distress and intubated.  Met sepsis severe sepsis criteria, transferred to ICU for further care.  In ICU developed DOC, persistently hypotensive and oliguric.  Central line placed, started on pressor therapy.  Weaned from pressors, creatinine normalized with good urine output.  Unable to wean from ventilator, became acutely hypotensive, noted to be acutely anemic.  Ultrasound of abdomen demonstrated acute enlargement of thoracic aortic aneurysm  Suspected rupture of thoracic aortic aneurysm, patient not a surgical candidate due to pulmonary sepsis.  DNR per family, attempted to treat with blood product transfusion and vasopressor support. Patient was too unstable for further imaging.     at 7:30.  For full account of hospital course please refer to actual medical records. As this is a brief summery.

## 2018-07-21 NOTE — PROGRESS NOTE ADULT - PROVIDER SPECIALTY LIST ADULT
Critical Care
Infectious Disease
Nephrology
Critical Care
Critical Care
Infectious Disease
Critical Care

## 2018-07-21 NOTE — PROGRESS NOTE ADULT - ASSESSMENT
Acute respiratory failure in an SNF patient  History of HTN and COPD  Known thoracic aneurysm  Tx for pneumonia- remains empiric, CXR some improvement  Blood and sputum culture negative  Legionella urine antigen negative  Remains afebrile  Minimal secretions    Now with acute drop in H/H (5.3/17.2), assoc shock and renal failure  Dramatic rise in WBC likely reactive to above; doubt secondary infection  Bld Cxs sent, and coverage expanded as above, but findings most suggestive of hemorrhagic shock  Pressor use, DOC, vent dependency, ?active bleeding, ?link to aneurysm, all affords very poor prognosis  Goals of care important here  Plan:  Will continue Meropenem pending f/u Cx results  Vent, pressor, transfusional support  ?CT imaging  Follow temps and CBC/diff   Prognosis poor

## 2018-07-21 NOTE — CHART NOTE - NSCHARTNOTEFT_GEN_A_CORE
Patient w/ worsening hypotension requiring high dose levophed and vasopressin. Repeat H/H now 5.1/14.9  down from 10.8/30.8. Patient most likely bleeding into abdomen given diffuse rigidity of abdomen w/ history of AAA.  Deemed not candidate for surgery or transfer to [facility] given instability. She is s/p 8 PRBC today. Receiving additional blood products would be  futile, will hold off on additional PRBC after discussion w/ eICU attending Dr. Orourke.  Lactate now 13.8, serum CO2 8. Patient actively dying on triple pressor therapy. Called Son and left message to call back, ASAP.

## 2018-07-21 NOTE — PROGRESS NOTE ADULT - SUBJECTIVE AND OBJECTIVE BOX
CC: f/u for pneumonia    Patient reports: she is sedated on vent, on 3 pressers, no urine output,with bleeding, s/p multiple transfusions    REVIEW OF SYSTEMS:  All other review of systems negative (Comprehensive ROS): limited by condition    Antimicrobials Day #  :day 7 total, day 2   meropenem  IVPB      meropenem  IVPB 500 milliGRAM(s) IV Intermittent every 12 hours    Other Medications Reviewed    T(F): 97.8 (18 @ 04:00), Max: 97.8 (18 @ 21:00)  HR: 74 (18 @ 05:00)  BP: 97/72 (18 @ 05:00)  RR: 24 (18 @ 05:00)  SpO2: 100% (18 @ 01:31)  Wt(kg): --    PHYSICAL EXAM:  General: poorly interactive on warming blanket  Eyes:  anicteric, no conjunctival injection, no discharge  Oropharynx: no lesions or injection 	  Neck: ETT  Lungs: coarse BS  Heart: regular rate and rhythm; no murmur, rubs or gallops  Abdomen: soft, nondistended, nontender, without mass or organomegaly  Skin: no lesions  Extremities: no clubbing, cyanosis, or edema  Neurologic: poorly interactive    LAB RESULTS:                        5.1    26.4  )-----------( 136      ( 2018 01:04 )             14.9     -    142  |  109<H>  |  65<H>  ----------------------------<  214<H>  5.7<H>   |  8<LL>  |  3.28<H>    Ca    7.4<L>      2018 01:04  Phos  11.2     -  Mg     2.0         TPro  4.7<L>  /  Alb  2.6<L>  /  TBili  0.6  /  DBili  x   /  AST  39  /  ALT  46<H>  /  AlkPhos  61      LIVER FUNCTIONS - ( 2018 03:50 )  Alb: 2.6 g/dL / Pro: 4.7 g/dL / ALK PHOS: 61 U/L / ALT: 46 U/L DA / AST: 39 U/L / GGT: x           Urinalysis Basic - ( 2018 05:33 )    Color: Yellow / Appearance: Clear / S.015 / pH: x  Gluc: x / Ketone: Negative  / Bili: Negative / Urobili: Negative   Blood: x / Protein: 100 / Nitrite: Negative   Leuk Esterase: Negative / RBC: 0-4 /HPF / WBC 0-2 /HPF   Sq Epi: x / Non Sq Epi: Neg.-Few / Bacteria: Trace /HPF      MICROBIOLOGY:  RECENT CULTURES:      RADIOLOGY REVIEWED:  < from: US Abdomen Complete (18 @ 09:40) >    EXAM:  US ABDOMEN COMPLETE      PROCEDURE DATE:  2018        INTERPRETATION:  Ultrasound of the abdomen    History abdominal aortic aneurysm, shock    Comparison CT performed 6 days prior    There is mild uniform gallbladder wall thickening without stone,   distention or elicited Espino sign. There is no biliary dilatation. There   is mild new generalized ascites. There are small bilateral pleural   effusions. The spleen and visualized portions of the pancreas are grossly   normal. The liver is heterogeneous in echotexture suggesting cirrhosis.   There are small cysts in the kidneys without hydronephrosis or calculus.   The renal cortex on the left is echogenic implying chronic cortical   disease. There is abdominal aortic aneurysm measuring approximately 5.7   cm in AP dimension.    IMPRESSION:    Mild generalized ascites    < end of copied text >

## 2018-07-21 NOTE — PROVIDER CONTACT NOTE (EICU) - SITUATION
Pt. qualified for [facility] referral as she was vented with GCS 3, no reflexes, and not overbreathing the vent.

## 2018-07-21 NOTE — DISCHARGE NOTE FOR THE EXPIRED PATIENT - OTHER SIGNIFICANT FINDINGS
EXAM:  US ABDOMEN COMPLETE    PROCEDURE DATE:  07/20/2018      INTERPRETATION:  Ultrasound of the abdomen  History abdominal aortic aneurysm, shock  Comparison CT performed 6 days prior  There is mild uniform gallbladder wall thickening without stone,   distention or elicited Espino sign. There is no biliary dilatation. There   is mild new generalized ascites. There are small bilateral pleural   effusions. The spleen and visualized portions of the pancreas are grossly   normal. The liver is heterogeneous in echotexture suggesting cirrhosis.   There are small cysts in the kidneys without hydronephrosis or calculus.   The renal cortex on the left is echogenic implying chronic cortical   disease. There is abdominal aortic aneurysm measuring approximately 5.7   cm in AP dimension.  IMPRESSION:  Mild generalized ascites    RAGHAV WOODARD M.D., ATTENDING RADIOLOGIST  This document has been electronically signed. Jul 20 2018 10:43AM    EXAM:  CT ANGIO ABD PELV (W)AW IC    EXAM:  CT ANGIO CHEST (W)AW IC      PROCEDURE DATE:  07/14/2018      INTERPRETATION:  CLINICAL HISTORY:  Dk in onset of hypoxia; evaluate for   pulmonary embolism. History of aneurysm; assess for aortic rupture.    Multiple axial images of the chest, abdomen and pelvis were obtained from   the lung apices through symphysis pubis with the administration of   intravascular contrast. 100cc of Omnipaque 350 was administered   intravenously without complication. Reformatted coronal and sagittal   images are submitted. MIP images were created utilizing dedicated   computer software.    COMPARISON: CT evaluation of the chest June 21, 2018; CT evaluation   abdomen/pelvis June 26, 2014.    IMPRESSION:    1. No filling defects identified within the segmental pulmonary arterial   tree to indicate pulmonary was them.  2. There is evidence for increased caliber of the distal aortic arch   measuring 3.8 cm. A region of atherosclerotic ulceration is identified   along the medial/inferior aspect of the aortic arch (no prior   contrast-enhanced CT evaluation of the chest is available for   comparison). The caliber of the descending thoracic aorta measures up to   3.8 cm, demonstrating prominent mural plaque, unchanged in size. The   caliber of the aorta at the level of the diaphragmatic hiatus is   unchanged measuring 3.7 cm. Aneurysmal dilatation of the abdominal aorta   is identified, significantly increased in size measuring up to 6.0 cm   with circumferential mural thrombus identified. There isno evidence for   aortic rupture or aortic dissection.  3. There are regions of irregular nodular opacity and increased   interstitial opacity within the right lower lobe lung parenchyma. Regions   of interstitial and airspace opacities are identified throughout the left   upper lobe and left lower lobe with dense left lower lobe consolidation   evident.     RASHMI ALEXANDRA   This document has been electronically signed. Jul 14 2018 11:05AM

## 2018-07-21 NOTE — DISCHARGE NOTE FOR THE EXPIRED PATIENT - NS PATIENT DEATH CRITERIA
Called by RN for full DNR patient with asystolic tracing on EKG, flat tracing on nell.  No heart sounds to prolonged auscultation.  Ventilator held, no spontaneous respirations noted, no spontaneous movements noted./Patient is dead based on Cardiopulmonary criteria including absent breath sounds, pulselessness and/or asystole

## 2018-07-21 NOTE — DISCHARGE NOTE FOR THE EXPIRED PATIENT - SECONDARY DIAGNOSIS.
Hypertension, unspecified type COPD exacerbation DOC (acute kidney injury) Anemia due to blood loss Thoracic aneurysm, ruptured Severe sepsis Hypovolemic shock Respiratory distress Pneumonia Asthma

## 2018-07-22 LAB
CULTURE RESULTS: SIGNIFICANT CHANGE UP
SPECIMEN SOURCE: SIGNIFICANT CHANGE UP

## 2018-07-25 LAB
CULTURE RESULTS: SIGNIFICANT CHANGE UP
CULTURE RESULTS: SIGNIFICANT CHANGE UP
SPECIMEN SOURCE: SIGNIFICANT CHANGE UP
SPECIMEN SOURCE: SIGNIFICANT CHANGE UP

## 2020-09-16 NOTE — PATIENT PROFILE ADULT. - NS PRO AD NO ADVANCE DIRECTIVE
[FreeTextEntry1] : Assessment:\par 1.  RLE DVT - provoked\par 2.  Frailty\par 3.  Peripheral neuropathy\par 4.  Advanced age\par 5.  Hemmorroids\par \par \par Plan:\par 1.  Eliquis for now - hope to minimize duration and dose soon given her age, frailty and hemorrhoids.  Perhaps transition to Eliquis 2.5mg BID soon - await duplex\par 2. Repeat venous duplex\par 3.  Echo to assess for murmur and LE edema\par 4.  Dr. Saucedo to discuss peripheral neuropathy treatment\par 5.  Role for continued PT, fall prevention, etc to be discussed with Lewis\par \par Call after testing (it is hard for her to come in)\par \par \par  No

## 2021-06-30 NOTE — ED ADULT NURSE NOTE - PMH
pt is requesting IVF to be d/c'ed since she is drinking and voiding. Asthma    COPD (chronic obstructive pulmonary disease)    Depression    Hypertension    Thoracic aortic aneurysm without rupture

## 2022-09-22 NOTE — ED ADULT NURSE NOTE - SKIN CAPILLARY REFILL
Pt received from previous RN. PT resting in bed at this time. Pt updated on plan of care. PT pending transportation to [facility]. 2 seconds or less

## 2024-02-05 NOTE — H&P ADULT - PROBLEM SELECTOR PLAN 1
Pt called and joce from 2/13 to 2/20 at 1215 msg sent to [facility] and pt is aware of new date and time    no obvious CHF and no obvious COPD exac.   With sig elevated d-dimer - check VQ scan (elev creatinine)  s/p 1 dose of lovenox in ER
